# Patient Record
Sex: FEMALE | Race: BLACK OR AFRICAN AMERICAN | Employment: OTHER | URBAN - METROPOLITAN AREA
[De-identification: names, ages, dates, MRNs, and addresses within clinical notes are randomized per-mention and may not be internally consistent; named-entity substitution may affect disease eponyms.]

---

## 2017-01-04 ENCOUNTER — OFFICE VISIT (OUTPATIENT)
Dept: FAMILY MEDICINE CLINIC | Age: 82
End: 2017-01-04

## 2017-01-04 ENCOUNTER — HOSPITAL ENCOUNTER (OUTPATIENT)
Dept: LAB | Age: 82
Discharge: HOME | End: 2017-01-04

## 2017-01-04 VITALS
SYSTOLIC BLOOD PRESSURE: 121 MMHG | RESPIRATION RATE: 18 BRPM | HEART RATE: 94 BPM | HEIGHT: 69 IN | DIASTOLIC BLOOD PRESSURE: 60 MMHG | BODY MASS INDEX: 35.55 KG/M2 | TEMPERATURE: 97.3 F | OXYGEN SATURATION: 98 % | WEIGHT: 240 LBS

## 2017-01-04 DIAGNOSIS — M25.511 CHRONIC RIGHT SHOULDER PAIN: ICD-10-CM

## 2017-01-04 DIAGNOSIS — G89.29 CHRONIC RIGHT SHOULDER PAIN: ICD-10-CM

## 2017-01-04 DIAGNOSIS — N28.9 IMPAIRED RENAL FUNCTION: ICD-10-CM

## 2017-01-04 DIAGNOSIS — E78.00 HYPERCHOLESTEROLEMIA: Primary | ICD-10-CM

## 2017-01-04 PROCEDURE — 99001 SPECIMEN HANDLING PT-LAB: CPT | Performed by: FAMILY MEDICINE

## 2017-01-04 NOTE — PROGRESS NOTES
Shantell Culver 80 y.o. female   Chief Complaint   Patient presents with    Labs     12-10-16    Follow-up    Cholesterol Problem         1. Have you been to the ER, urgent care clinic since your last visit? Hospitalized since your last visit? No    2. Have you seen or consulted any other health care providers outside of the [address] [place] [address] since your last visit? Include any pap smears or colon screening.  No

## 2017-01-04 NOTE — MR AVS SNAPSHOT
Visit Information Date & Time Provider Department Dept. Phone Encounter #  
 1/4/2017 11:15 AM Scooby Shultz MD [address] [id number] Your Appointments 1/31/2017  2:20 PM  
Follow Up with Shelbie Maynard DO [facility] ([facility]) Appt Note: Follow up with Dr Tiki Atkinson in 6 months [place] [id number] [phone] [facility] [address] Upcoming Health Maintenance Date Due DTaP/Tdap/Td series (1 - Tdap) 8/30/1958 ZOSTER VACCINE AGE 60> 8/30/1997 Pneumococcal 65+ Low/Medium Risk (1 of 2 - PCV13) 8/30/2002 GLAUCOMA SCREENING Q2Y 12/31/2014 MEDICARE YEARLY EXAM 10/30/2015 INFLUENZA AGE 9 TO ADULT 8/1/2016 Allergies as of 1/4/2017  Review Complete On: 1/4/2017 By: Scooby Shultz MD  
 No Known Allergies Current Immunizations  Reviewed on 11/21/2014 No immunizations on file. Not reviewed this visit You Were Diagnosed With   
  
 Codes Comments Hypercholesterolemia    -  Primary ICD-10-CM: E78.00 ICD-9-CM: 272.0 Impaired renal function     ICD-10-CM: N28.9 ICD-9-CM: 593.9 Chronic right shoulder pain     ICD-10-CM: M25.511, G89.29 ICD-9-CM: 719.41, 338.29 Vitals BP Pulse Temp Resp Height(growth percentile) Weight(growth percentile) 121/60 (BP 1 Location: Left arm, BP Patient Position: Sitting) 94 97.3 °F (36.3 °C) (Oral) 18 5' 9\" (1.753 m) 240 lb (108.9 kg) LMP SpO2 BMI OB Status Smoking Status 12/31/1985 98% 35.44 kg/m2 Hysterectomy Former Smoker BMI and BSA Data Body Mass Index Body Surface Area  
 35.44 kg/m 2 2.3 m 2 Preferred Pharmacy Pharmacy Name Phone [facility] [place] [facility] Your Updated Medication List  
  
   
 This list is accurate as of: 1/4/17 11:50 AM.  Always use your most recent med list.  
  
  
  
  
 atorvastatin 40 mg tablet Commonly known as:  LIPITOR Take 1 Tab by mouth daily. celecoxib 200 mg capsule Commonly known as:  CeleBREX Take 1 Cap by mouth two (2) times daily as needed for Pain. furosemide 20 mg tablet Commonly known as:  LASIX Take 1 Tab by mouth daily. Indications: EDEMA  
  
 gabapentin 300 mg capsule Commonly known as:  NEURONTIN Take 300 mg by mouth three (3) times daily. * HYDROcodone-acetaminophen  mg tablet Commonly known as:  NORCO  
1 PO Q 8 HRS PRN PAIN  
  
 * HYDROcodone-acetaminophen 5-325 mg per tablet Commonly known as:  NORCO  
  
 polyethylene glycol 17 gram packet Commonly known as:  Natalia Clos Take 1 Packet by mouth daily. VITAMIN B-12 500 mcg tablet Generic drug:  cyanocobalamin Take 500 mcg by mouth daily. VITAMIN D3 1,000 unit tablet Generic drug:  cholecalciferol * Notice: This list has 2 medication(s) that are the same as other medications prescribed for you. Read the directions carefully, and ask your doctor or other care provider to review them with you. To-Do List   
 01/04/2017 Lab:  METABOLIC PANEL, BASIC   
  
 03/27/2017 Lab:  LIPID PANEL   
  
 03/27/2017 Lab:  METABOLIC PANEL, COMPREHENSIVE Patient Instructions Please contact our office if you have any questions about your visit today. Introducing [facility]! Gideon Holbrook introduces [employer] patient portal. Now you can access parts of your medical record, email your doctor's office, and request medication refills online. 1. In your internet browser, go to [email or web address] 2. Click on the First Time User? Click Here link in the Sign In box. You will see the New Member Sign Up page. 3. Enter your Pred[employer] Access Code exactly as it appears below.  You will not need to use this code after youve completed the sign-up process. If you do not sign up before the expiration date, you must request a new code. · [employer] Access Code: [email or web address] Expires: 3/2/2017 11:05 AM 
 
4. Enter the last four digits of your Social Security Number (xxxx) and Date of Birth (mm/dd/yyyy) as indicated and click Submit. You will be taken to the next sign-up page. 5. Create a [employer] ID. This will be your [employer] login ID and cannot be changed, so think of one that is secure and easy to remember. 6. Create a [employer] password. You can change your password at any time. 7. Enter your Password Reset Question and Answer. This can be used at a later time if you forget your password. 8. Enter your e-mail address. You will receive e-mail notification when new information is available in [address] 9. Click Sign Up. You can now view and download portions of your medical record. 10. Click the Download Summary menu link to download a portable copy of your medical information. If you have questions, please visit the Frequently Asked Questions section of the [employer] website. Remember, [employer] is NOT to be used for urgent needs. For medical emergencies, dial 911. Now available from your iPhone and Android! Please provide this summary of care documentation to your next provider. Your primary care clinician is listed as Yumi Wyman. If you have any questions after today's visit, please call [phone].

## 2017-01-04 NOTE — PROGRESS NOTES
HISTORY OF PRESENT ILLNESS  Josue Metcalf is a 80 y.o. female. Chief Complaint   Patient presents with    Labs     12-10-16    Follow-up    Cholesterol Problem       HPI  Pt is here for follow up of Elevated Cholesterol, and review labs. Pt had labs on 12-10-16. Labs reviewed in detail with pt. Pt notes that she has been eating more sweets through the holidays. She is taking her atorvastatin daily. Pt  She has not been taking any otc pain meds. Pt does not need medication refills today. New concerns today: pt's hip does not hurt currently. It is just numb. She does have some r shoulder pain and notices some grinding. It has been bothersome for about 2 wks now. She has been using bengay. Review of Systems   Constitutional: Negative. HENT: Negative. Respiratory: Negative. Musculoskeletal: Positive for joint pain. All other systems reviewed and are negative. Physical Exam  Physical Exam   Nursing note and vitals reviewed. Constitutional: She is oriented to person, place, and time. She appears well-developed and well-nourished. HENT:   Head: Normocephalic and atraumatic. Right Ear: External ear normal.   Left Ear: External ear normal.   Nose: Nose normal.   Eyes: Conjunctivae and EOM are normal.   Neck: Normal range of motion. Neck supple. No JVD present. Carotid bruit is not present. No thyromegaly present. Cardiovascular: Normal rate, regular rhythm, normal heart sounds and intact distal pulses. Exam reveals no gallop and no friction rub. No murmur heard. Pulmonary/Chest: Effort normal and breath sounds normal. She has no wheezes. She has no rhonchi. She has no rales. Abdominal: Soft. Bowel sounds are normal.   Musculoskeletal: Normal range of motion. Neurological: She is alert and oriented to person, place, and time. Coordination normal.   Skin: Skin is warm and dry. Psychiatric: She has a normal mood and affect.  Her behavior is normal. Judgment and thought content normal.     ASSESSMENT and Adra Frames was seen today for labs, follow-up and cholesterol problem. Diagnoses and all orders for this visit:    Hypercholesterolemia  -     METABOLIC PANEL, COMPREHENSIVE; Future  -     LIPID PANEL; Future    Impaired renal function  -     METABOLIC PANEL, BASIC; Future  -     METABOLIC PANEL, BASIC    Chronic right shoulder pain  Pt declines xray at this time. She will cont conservative management at home.       Follow-up Disposition: 3 months; sooner prn

## 2017-01-05 LAB
BUN SERPL-MCNC: 16 MG/DL (ref 8–27)
BUN/CREAT SERPL: 19 (ref 11–26)
CALCIUM SERPL-MCNC: 9.2 MG/DL (ref 8.7–10.3)
CHLORIDE SERPL-SCNC: 103 MMOL/L (ref 96–106)
CO2 SERPL-SCNC: 24 MMOL/L (ref 18–29)
CREAT SERPL-MCNC: 0.84 MG/DL (ref 0.57–1)
GLUCOSE SERPL-MCNC: 104 MG/DL (ref 65–99)
POTASSIUM SERPL-SCNC: 4.1 MMOL/L (ref 3.5–5.2)
SODIUM SERPL-SCNC: 142 MMOL/L (ref 134–144)

## 2017-03-27 DIAGNOSIS — E78.00 HYPERCHOLESTEROLEMIA: ICD-10-CM

## 2017-03-28 ENCOUNTER — HOSPITAL ENCOUNTER (OUTPATIENT)
Dept: LAB | Age: 82
Discharge: HOME | End: 2017-03-28

## 2017-03-28 PROCEDURE — 99001 SPECIMEN HANDLING PT-LAB: CPT | Performed by: FAMILY MEDICINE

## 2017-03-29 LAB
ALBUMIN SERPL-MCNC: 3.8 G/DL (ref 3.5–4.8)
ALBUMIN/GLOB SERPL: 1.4 {RATIO} (ref 1.2–2.2)
ALP SERPL-CCNC: 132 IU/L (ref 39–117)
ALT SERPL-CCNC: 10 IU/L (ref 0–32)
AST SERPL-CCNC: 15 IU/L (ref 0–40)
BILIRUB SERPL-MCNC: 0.4 MG/DL (ref 0–1.2)
BUN SERPL-MCNC: 16 MG/DL (ref 8–27)
BUN/CREAT SERPL: 18 (ref 11–26)
CALCIUM SERPL-MCNC: 9.1 MG/DL (ref 8.7–10.3)
CHLORIDE SERPL-SCNC: 107 MMOL/L (ref 96–106)
CHOLEST SERPL-MCNC: 224 MG/DL (ref 100–199)
CO2 SERPL-SCNC: 23 MMOL/L (ref 18–29)
CREAT SERPL-MCNC: 0.88 MG/DL (ref 0.57–1)
GLOBULIN SER CALC-MCNC: 2.8 G/DL (ref 1.5–4.5)
GLUCOSE SERPL-MCNC: 97 MG/DL (ref 65–99)
HDLC SERPL-MCNC: 61 MG/DL
INTERPRETATION, 910389: NORMAL
LDLC SERPL CALC-MCNC: 145 MG/DL (ref 0–99)
POTASSIUM SERPL-SCNC: 4.6 MMOL/L (ref 3.5–5.2)
PROT SERPL-MCNC: 6.6 G/DL (ref 6–8.5)
SODIUM SERPL-SCNC: 144 MMOL/L (ref 134–144)
TRIGL SERPL-MCNC: 90 MG/DL (ref 0–149)
VLDLC SERPL CALC-MCNC: 18 MG/DL (ref 5–40)

## 2017-04-04 ENCOUNTER — OFFICE VISIT (OUTPATIENT)
Dept: FAMILY MEDICINE CLINIC | Age: 82
End: 2017-04-04

## 2017-04-04 ENCOUNTER — TELEPHONE (OUTPATIENT)
Dept: FAMILY MEDICINE CLINIC | Age: 82
End: 2017-04-04

## 2017-04-04 VITALS
HEIGHT: 69 IN | BODY MASS INDEX: 35.55 KG/M2 | WEIGHT: 240 LBS | TEMPERATURE: 97.1 F | DIASTOLIC BLOOD PRESSURE: 61 MMHG | OXYGEN SATURATION: 99 % | HEART RATE: 64 BPM | RESPIRATION RATE: 16 BRPM | SYSTOLIC BLOOD PRESSURE: 127 MMHG

## 2017-04-04 DIAGNOSIS — Z13.39 SCREENING FOR ALCOHOLISM: ICD-10-CM

## 2017-04-04 DIAGNOSIS — Z00.00 ENCOUNTER FOR MEDICARE ANNUAL WELLNESS EXAM: Primary | ICD-10-CM

## 2017-04-04 DIAGNOSIS — E78.00 HYPERCHOLESTEROLEMIA: ICD-10-CM

## 2017-04-04 DIAGNOSIS — Z78.9 IMPAIRED MOBILITY AND ADLS: ICD-10-CM

## 2017-04-04 DIAGNOSIS — Z74.09 IMPAIRED MOBILITY AND ADLS: ICD-10-CM

## 2017-04-04 DIAGNOSIS — M15.9 PRIMARY OSTEOARTHRITIS INVOLVING MULTIPLE JOINTS: Chronic | ICD-10-CM

## 2017-04-04 DIAGNOSIS — Z71.89 ACP (ADVANCE CARE PLANNING): ICD-10-CM

## 2017-04-04 DIAGNOSIS — M62.81 MUSCLE WEAKNESS (GENERALIZED): ICD-10-CM

## 2017-04-04 DIAGNOSIS — Z96.641 STATUS POST RIGHT HIP REPLACEMENT: ICD-10-CM

## 2017-04-04 DIAGNOSIS — M15.9 OSTEOARTHRITIS OF MULTIPLE JOINTS, UNSPECIFIED OSTEOARTHRITIS TYPE: Primary | Chronic | ICD-10-CM

## 2017-04-04 DIAGNOSIS — R60.0 EDEMA OF BOTH LEGS: ICD-10-CM

## 2017-04-04 DIAGNOSIS — H61.21 RIGHT EAR IMPACTED CERUMEN: ICD-10-CM

## 2017-04-04 DIAGNOSIS — Z91.81 AT RISK FOR FALLS: ICD-10-CM

## 2017-04-04 RX ORDER — CELECOXIB 200 MG/1
200 CAPSULE ORAL
Qty: 60 CAP | Refills: 5 | Status: SHIPPED | OUTPATIENT
Start: 2017-04-04 | End: 2017-10-30 | Stop reason: SDUPTHER

## 2017-04-04 RX ORDER — FUROSEMIDE 20 MG/1
20 TABLET ORAL DAILY
Qty: 30 TAB | Refills: 5 | Status: SHIPPED | OUTPATIENT
Start: 2017-04-04 | End: 2018-07-18 | Stop reason: SDUPTHER

## 2017-04-04 NOTE — PROGRESS NOTES
Paige Kc 80 y.o. female   Chief Complaint   Patient presents with    Follow-up     3 month f/u    Cholesterol Problem    Osteoporosis    Labs     completed on 3-28-17         1. Have you been to the ER, urgent care clinic since your last visit? Hospitalized since your last visit? No    2. Have you seen or consulted any other health care providers outside of the [address] [place] [address] since your last visit? Include any pap smears or colon screening.  No

## 2017-04-04 NOTE — TELEPHONE ENCOUNTER
Pt tried taking her prescription for a bedside commode to [facility] but they would not accept it. She was told to call you back if it didn't work.

## 2017-04-04 NOTE — ACP (ADVANCE CARE PLANNING)
Advance Care Planning (ACP) Provider Note - Comprehensive     Date of ACP Conversation: 04/04/17  Persons included in Conversation:  patient  Length of ACP Conversation in minutes:  16 minutes    Authorized Decision Maker (if patient is incapable of making informed decisions): This person is: Other Legally Authorized Decision Maker (e.g. Next of Kin) son: Carmina Calix for ALL Patients with Decision Making Capacity:   Understanding of the healthcare agent role was assessed and information provided  Exploration of values, goals, and preferences if recovery is not expected, even with continued medical treatment in the event of: Imminent death  Severe, permanent brain injury  \"In these circumstances, what matters most to you? \"  Care focused more on comfort or quality of life. \"What, if any, treatments would you want to avoid? \" mechanical ventilation, tube feeding    Review of Existing Advance Directive:  What is your understanding of your agent's willingness to honor your wishes, even if he/she may not agree with them? her son would honor her wishes    For Serious or Chronic Illness:  Understanding of medical condition      Interventions Provided:  Recommended completion of Advance Directive form after review of ACP materials and conversation with prospective healthcare agent

## 2017-04-04 NOTE — MR AVS SNAPSHOT
Visit Information Date & Time Provider Department Dept. Phone Encounter #  
 4/4/2017 11:15 AM Agata Henry MD [facility] [id number] Upcoming Health Maintenance Date Due  
 GLAUCOMA SCREENING Q2Y 12/31/2014 MEDICARE YEARLY EXAM 10/30/2015 ZOSTER VACCINE AGE 60> 4/30/2018* DTaP/Tdap/Td series (1 - Tdap) 4/30/2018* Pneumococcal 65+ Low/Medium Risk (2 of 2 - PPSV23) 4/4/2018 *Topic was postponed. The date shown is not the original due date. Allergies as of 4/4/2017  Review Complete On: 4/4/2017 By: Agata Henry MD  
 No Known Allergies Current Immunizations  Reviewed on 11/21/2014 No immunizations on file. Not reviewed this visit You Were Diagnosed With   
  
 Codes Comments Hypercholesterolemia    -  Primary ICD-10-CM: E78.00 ICD-9-CM: 272.0 Right ear impacted cerumen     ICD-10-CM: H61.21 ICD-9-CM: 380.4 Edema of both legs     ICD-10-CM: R60.0 ICD-9-CM: 782.3 Routine general medical examination at a health care facility     ICD-10-CM: Z00.00 ICD-9-CM: V70.0 Screening for alcoholism     ICD-10-CM: Z13.89 ICD-9-CM: V79.1 Impaired mobility and ADLs     ICD-10-CM: Z74.09 
ICD-9-CM: 799.89 Vitals BP Pulse Temp Resp Height(growth percentile) Weight(growth percentile) 127/61 64 97.1 °F (36.2 °C) (Oral) 16 5' 9\" (1.753 m) 240 lb (108.9 kg) LMP SpO2 BMI OB Status Smoking Status 12/31/1985 99% 35.44 kg/m2 Hysterectomy Former Smoker BMI and BSA Data Body Mass Index Body Surface Area  
 35.44 kg/m 2 2.3 m 2 Preferred Pharmacy Pharmacy Name Phone Anny Zavala [facility] Your Updated Medication List  
  
   
This list is accurate as of: 4/4/17 12:48 PM.  Always use your most recent med list.  
  
  
  
  
 atorvastatin 40 mg tablet Commonly known as:  LIPITOR Take 1 Tab by mouth daily. celecoxib 200 mg capsule Commonly known as:  CeleBREX Take 1 Cap by mouth two (2) times daily as needed for Pain. furosemide 20 mg tablet Commonly known as:  LASIX Take 1 Tab by mouth daily. Indications: Edema  
  
 gabapentin 300 mg capsule Commonly known as:  NEURONTIN Take 300 mg by mouth three (3) times daily. HYDROcodone-acetaminophen 5-325 mg per tablet Commonly known as:  NORCO  
  
 polyethylene glycol 17 gram packet Commonly known as:  Be[place]ord Hallmark Take 1 Packet by mouth daily. VITAMIN B-12 500 mcg tablet Generic drug:  cyanocobalamin Take 500 mcg by mouth daily. VITAMIN D3 1,000 unit tablet Generic drug:  cholecalciferol Prescriptions Sent to Pharmacy Refills  
 furosemide (LASIX) 20 mg tablet 5 Sig: Take 1 Tab by mouth daily. Indications: Edema Class: Normal  
 Pharmacy: [address], [place]  #: [phone] Route: Oral  
  
We Performed the Following AMB SUPPLY ORDER [[id number] Custom] Comments:  
 Dispense (1) Bedside Commode. Patient Instructions Please contact our office if you have any questions about your visit today. Medicare Wellness Visit, Female The best way to live healthy is to have a healthy lifestyle by eating a well-balanced diet, exercising regularly, limiting alcohol and stopping smoking. Regular physical exams and screening tests are another way to keep healthy. Preventive exams provided by your health care provider can find health problems before they become diseases or illnesses. Preventive services including immunizations, screening tests, monitoring and exams can help you take care of your own health. All people over age 72 should have a pneumovax  and and a prevnar shot to prevent pneumonia. These are once in a lifetime unless you and your provider decide differently.  
 
All people over 65 should have a yearly flu shot and a tetanus vaccine every 10 years. A bone mass density to screen for osteoporosis or thinning of the bones should be done every 2 years after 65. Screening for diabetes mellitus with a blood sugar test should be done every year. Glaucoma is a disease of the eye due to increased ocular pressure that can lead to blindness and it should be done every year by an eye professional. 
 
Cardiovascular screening tests that check for elevated lipids (fatty part of blood) which can lead to heart disease and strokes should be done every 5 years. Colorectal screening that evaluates for blood or polyps in your colon should be done yearly as a stool test or every five years as a flexible sigmoidoscope or every 10 years as a colonoscopy up to age 76. Breast cancer screening with a mammogram is recommended biennially  for women age 54-69. Screening for cervical cancer with a pap smear and pelvic exam is recommended for women after age 72 years every 2 years up to age 79 or when the provider and patient decide to stop. If there is a history of cervical abnormalities or other increased risk for cancer then the test is recommended yearly. Hepatitis C screening is also recommended for anyone born between 80 through [address]. A shingles vaccine is also recommended once in a lifetime after age 61. Your Medicare Wellness Exam is recommended annually. Here is a list of your current Health Maintenance items with a due date: 
Health Maintenance Due Topic Date Due  Glaucoma Screening   12/31/2014 [address] [place] [address] Annual Well Visit  10/30/2015 Dual-Energy X-Ray Absorptiometry (DXA) Test: About This Test 
What is it? Dual-energy X-ray absorptiometry (DXA) is a test that uses two different X-ray beams to check bone thickness (density) in your spine and hip. This information is used to estimate the strength of your bones. Why is this test done?  
A DXA test is done to check for bone thinning and weakness, which can make it easier for you to break a bone. It is often done for: 
· People who are at risk for osteoporosis, including: ¨ Women who are age 72 and older. ¨ Older men. ¨ People who take some medications, such as corticosteroids. ¨ People who have certain medical conditions, such as hyperparathyroidism. · People who have osteoporosis, to see how well treatment is working. What happens before the test? 
· Women who are pregnant should not have this test. Let your doctor know if you are or might be pregnant. · You may be able to leave your clothes on for the test, but you will remove any metal buttons or pernell for the test. 
What happens during the test? 
· You will lie down on your back on a padded table. · You may need to lie with your legs straight or with your lower legs resting on a platform built into the table. · The machine will scan your bones and measure the amount of radiation they absorb. During this test you are exposed to a very low dose of radiation. How long does the test take? · The test will take about 20 minutes. What happens after the test? 
· You will probably be able to go home right away. · You can go back to your usual activities right away. Follow-up care is a key part of your treatment and safety. Be sure to make and go to all appointments, and call your doctor if you are having problems. It's also a good idea to keep a list of the medicines you take. Ask your doctor when you can expect to have your test results. Where can you learn more? Go to http://nita-kady.info/. Enter C276 in the search box to learn more about \"Dual-Energy X-Ray Absorptiometry (DXA) Test: About This Test.\" Current as of: August 4, 2016 Content Version: 11.2 © 8713-5238 CaratLane. Care instructions adapted under license by [email or web address] (which disclaims liability or warranty for this information).  If you have questions about a medical condition or this instruction, always ask your healthcare professional. [address] any warranty or liability for your use of this information. Statins: Care Instructions Your Care Instructions Statins are medicines that lower your cholesterol and your risk for a heart attack and stroke. Cholesterol is a type of fat in your blood. If you have too much cholesterol, it can build up in blood vessels. This raises your risk of heart disease, heart attack, and stroke. Statins lower cholesterol by blocking how much cholesterol your body makes. This prevents cholesterol from building up in your blood vessels. This is called hardening of the arteries. It is the starting point for some heart and blood flow problems, such as heart disease. Statins may also reduce inflammation around the buildup (called plaque). This can lower the risk that the plaque will break apart and lead to a heart attack or stroke. A heart-healthy lifestyle is important for lowering your risk whether you take statins or not. This includes eating healthy foods, being active, staying at a healthy weight, and not smoking. You must take statins regularly for them to work well. If you stop, your cholesterol and your risk will go back up. Examples of statins include: · Atorvastatin (Lipitor). · Lovastatin (Mevacor). · Pravastatin (Pravachol). · Simvastatin (Zocor). Statins interact with many medicines. So tell your doctor all of the other medicines that you take. These include prescription medicines, over-the-counter medicines, dietary supplements, and herbal products. Follow-up care is a key part of your treatment and safety. Be sure to make and go to all appointments, and call your doctor if you are having problems. It's also a good idea to know your test results and keep a list of the medicines you take. How can you care for yourself at home? · Take statins exactly as your doctor tells you.  High cholesterol has no symptoms. So it is easy to forget to take the pills. Try to make a system that reminds you to take them. · Do not take two or more medicines at the same time unless the doctor told you to. Statins can interact with other medicines. · Always tell your doctor if you think you are having a side effect. If side effects are a problem with one medicine, a different one may be used. · Keep making the lifestyle changes your doctor suggests. Eat heart-healthy foods, be active, don't smoke, and stay at a healthy weight. · Talk to your doctor about avoiding grapefruit juice if you take statins. Grapefruit juice can raise the level of this medicine in your blood. This could increase side effects. When should you call for help? Watch closely for changes in your health, and be sure to contact your doctor if: 
· You have side effects of statins. These include: ¨ Fatigue. ¨ Upset stomach. ¨ Gas. ¨ Constipation. ¨ Pain or cramps in the belly. ¨ Muscle aches. · You have any new symptoms or side effects. Where can you learn more? Go to http://nita-kady.info/. Enter R358 in the search box to learn more about \"Statins: Care Instructions. \" Current as of: June 30, 2016 Content Version: 11.2 © 5211-4288 MOAEC. Care instructions adapted under license by HealthWave (which disclaims liability or warranty for this information). If you have questions about a medical condition or this instruction, always ask your healthcare professional. [place] [address] any warranty or liability for your use of this information. Heart-Healthy Diet: Care Instructions Your Care Instructions A heart-healthy diet has lots of vegetables, fruits, nuts, beans, and whole grains, and is low in salt. It limits foods that are high in saturated fat, such as meats, cheeses, and fried foods.  It may be hard to change your diet, but even small changes can lower your risk of heart attack and heart disease. Follow-up care is a key part of your treatment and safety. Be sure to make and go to all appointments, and call your doctor if you are having problems. It's also a good idea to know your test results and keep a list of the medicines you take. How can you care for yourself at home? Watch your portions · Learn what a serving is. A \"serving\" and a \"portion\" are not always the same thing. Make sure that you are not eating larger portions than are recommended. For example, a serving of pasta is ½ cup. A serving size of meat is 2 to 3 ounces. A 3-ounce serving is about the size of a deck of cards. Measure serving sizes until you are good at Coolin" them. Keep in mind that restaurants often serve portions that are 2 or 3 times the size of one serving. · To keep your energy level up and keep you from feeling hungry, eat often but in smaller portions. · Eat only the number of calories you need to stay at a healthy weight. If you need to lose weight, eat fewer calories than your body burns (through exercise and other physical activity). Eat more fruits and vegetables · Eat a variety of fruit and vegetables every day. Dark green, deep orange, red, or yellow fruits and vegetables are especially good for you. Examples include spinach, carrots, peaches, and berries. · Keep carrots, celery, and other veggies handy for snacks. Buy fruit that is in season and store it where you can see it so that you will be tempted to eat it. · Cook dishes that have a lot of veggies in them, such as stir-fries and soups. Limit saturated and trans fat · Read food labels, and try to avoid saturated and trans fats. They increase your risk of heart disease. Trans fat is found in many processed foods such as cookies and crackers. · Use olive or canola oil when you cook. Try cholesterol-lowering spreads, such as Benecol or Take Control. · Bake, broil, grill, or steam foods instead of frying them. · Choose lean meats instead of high-fat meats such as hot dogs and sausages. Cut off all visible fat when you prepare meat. · Eat fish, skinless poultry, and meat alternatives such as soy products instead of high-fat meats. Soy products, such as tofu, may be especially good for your heart. · Choose low-fat or fat-free milk and dairy products. Eat fish · Eat at least two servings of fish a week. Certain fish, such as salmon and tuna, contain omega-3 fatty acids, which may help reduce your risk of heart attack. Eat foods high in fiber · Eat a variety of grain products every day. Include whole-grain foods that have lots of fiber and nutrients. Examples of whole-grain foods include oats, whole wheat bread, and brown rice. · Buy whole-grain breads and cereals, instead of white bread or pastries. Limit salt and sodium · Limit how much salt and sodium you eat to help lower your blood pressure. · Taste food before you salt it. Add only a little salt when you think you need it. With time, your taste buds will adjust to less salt. · Eat fewer snack items, fast foods, and other high-salt, processed foods. Check food labels for the amount of sodium in packaged foods. · Choose low-sodium versions of canned goods (such as soups, vegetables, and beans). Limit sugar · Limit drinks and foods with added sugar. These include candy, desserts, and soda pop. Limit alcohol · Limit alcohol to no more than 2 drinks a day for men and 1 drink a day for women. Too much alcohol can cause health problems. When should you call for help? Watch closely for changes in your health, and be sure to contact your doctor if: 
· You would like help planning heart-healthy meals. Where can you learn more? Go to http://nita-kady.info/. Enter V137 in the search box to learn more about \"Heart-Healthy Diet: Care Instructions. \" 
 Current as of: January 27, 2016 Content Version: 11.2 © 8198-1423 TraceLink. Care instructions adapted under license by Tagstr (which disclaims liability or warranty for this information). If you have questions about a medical condition or this instruction, always ask your healthcare professional. [facility] [address] any warranty or liability for your use of this information. Advance Directives: Care Instructions Your Care Instructions An advance directive is a legal way to state your wishes at the end of your life. It tells your family and your doctor what to do if you can no longer say what you want. There are two main types of advance directives. You can change them any time that your wishes change. · A living will tells your family and your doctor your wishes about life support and other treatment. · A durable power of  for health care lets you name a person to make treatment decisions for you when you can't speak for yourself. This person is called a health care agent. If you do not have an advance directive, decisions about your medical care may be made by a doctor or a  who doesn't know you. It may help to think of an advance directive as a gift to the people who care for you. If you have one, they won't have to make tough decisions by themselves. Follow-up care is a key part of your treatment and safety. Be sure to make and go to all appointments, and call your doctor if you are having problems. It's also a good idea to know your test results and keep a list of the medicines you take. How can you care for yourself at home? · Discuss your wishes with your loved ones and your doctor. This way, there are no surprises. · Many states have a unique form. Or you might use a universal form that has been approved by many states. This kind of form can sometimes be completed and stored online.  Your electronic copy will then be available wherever you have a connection to the Internet. In most cases, doctors will respect your wishes even if you have a form from a different state. · You don't need a  to do an advance directive. But you may want to get legal advice. · Think about these questions when you prepare an advance directive: ¨ Who do you want to make decisions about your medical care if you are not able to? Many people choose a family member or close friend. ¨ Do you know enough about life support methods that might be used? If not, talk to your doctor so you understand. ¨ What are you most afraid of that might happen? You might be afraid of having pain, losing your independence, or being kept alive by machines. ¨ Where would you prefer to die? Choices include your home, a hospital, or a nursing home. ¨ Would you like to have information about hospice care to support you and your family? ¨ Do you want to donate organs when you die? ¨ Do you want certain Islam practices performed before you die? If so, put your wishes in the advance directive. · Read your advance directive every year, and make changes as needed. When should you call for help? Be sure to contact your doctor if you have any questions. Where can you learn more? Go to http://nita-kady.info/. Enter R264 in the search box to learn more about \"Advance Directives: Care Instructions. \" Current as of: November 17, 2016 Content Version: 11.2 © 8210-3550 Healthwise, Incorporated. Care instructions adapted under license by Scards (which disclaims liability or warranty for this information). If you have questions about a medical condition or this instruction, always ask your healthcare professional. [address] any warranty or liability for your use of this information. Introducing [facility]!    
 [employer] introduces [employer] patient portal. Now you can access parts of your medical record, email your doctor's office, and request medication refills online. 1. In your internet browser, go to [email or web address] 2. Click on the First Time User? Click Here link in the Sign In box. You will see the New Member Sign Up page. 3. Enter your [employer] Access Code exactly as it appears below. You will not need to use this code after youve completed the sign-up process. If you do not sign up before the expiration date, you must request a new code. · [employer] Access Code: [email or web address] Expires: 6/26/2017  7:55 AM 
 
4. Enter the last four digits of your Social Security Number (xxxx) and Date of Birth (mm/dd/yyyy) as indicated and click Submit. You will be taken to the next sign-up page. 5. Create a [employer] ID. This will be your [employer] login ID and cannot be changed, so think of one that is secure and easy to remember. 6. Create a [employer] password. You can change your password at any time. 7. Enter your Password Reset Question and Answer. This can be used at a later time if you forget your password. 8. Enter your e-mail address. You will receive e-mail notification when new information is available in [address] 9. Click Sign Up. You can now view and download portions of your medical record. 10. Click the Download Summary menu link to download a portable copy of your medical information. If you have questions, please visit the Frequently Asked Questions section of the One [employer] website. Remember, [employer] is NOT to be used for urgent needs. For medical emergencies, dial 911. Now available from your iPhone and Android! Please provide this summary of care documentation to your next provider. Your primary care clinician is listed as [facility]. If you have any questions after today's visit, please call [phone].

## 2017-04-04 NOTE — PATIENT INSTRUCTIONS
Please contact our office if you have any questions about your visit today. Medicare Wellness Visit, Female    The best way to live healthy is to have a healthy lifestyle by eating a well-balanced diet, exercising regularly, limiting alcohol and stopping smoking. Regular physical exams and screening tests are another way to keep healthy. Preventive exams provided by your health care provider can find health problems before they become diseases or illnesses. Preventive services including immunizations, screening tests, monitoring and exams can help you take care of your own health. All people over age 72 should have a pneumovax  and and a prevnar shot to prevent pneumonia. These are once in a lifetime unless you and your provider decide differently. All people over 65 should have a yearly flu shot and a tetanus vaccine every 10 years. A bone mass density to screen for osteoporosis or thinning of the bones should be done every 2 years after 65. Screening for diabetes mellitus with a blood sugar test should be done every year. Glaucoma is a disease of the eye due to increased ocular pressure that can lead to blindness and it should be done every year by an eye professional.    Cardiovascular screening tests that check for elevated lipids (fatty part of blood) which can lead to heart disease and strokes should be done every 5 years. Colorectal screening that evaluates for blood or polyps in your colon should be done yearly as a stool test or every five years as a flexible sigmoidoscope or every 10 years as a colonoscopy up to age 76. Breast cancer screening with a mammogram is recommended biennially  for women age 54-69. Screening for cervical cancer with a pap smear and pelvic exam is recommended for women after age 72 years every 2 years up to age 79 or when the provider and patient decide to stop. If there is a history of cervical abnormalities or other increased risk for cancer then the test is recommended yearly. Hepatitis C screening is also recommended for anyone born between [birth year] through [address]. A shingles vaccine is also recommended once in a lifetime after age 61. Your Medicare Wellness Exam is recommended annually. Here is a list of your current Health Maintenance items with a due date:  Health Maintenance Due   Topic Date Due    Glaucoma Screening   12/31/2014    Annual Well Visit  10/30/2015            Dual-Energy X-Ray Absorptiometry (DXA) Test: About This Test  What is it? Dual-energy X-ray absorptiometry (DXA) is a test that uses two different X-ray beams to check bone thickness (density) in your spine and hip. This information is used to estimate the strength of your bones. Why is this test done? A DXA test is done to check for bone thinning and weakness, which can make it easier for you to break a bone. It is often done for:  · People who are at risk for osteoporosis, including:  ¨ Women who are age 72 and older. ¨ Older men. ¨ People who take some medications, such as corticosteroids. ¨ People who have certain medical conditions, such as hyperparathyroidism. · People who have osteoporosis, to see how well treatment is working. What happens before the test?  · Women who are pregnant should not have this test. Let your doctor know if you are or might be pregnant. · You may be able to leave your clothes on for the test, but you will remove any metal buttons or pernell for the test.  What happens during the test?  · You will lie down on your back on a padded table. · You may need to lie with your legs straight or with your lower legs resting on a platform built into the table. · The machine will scan your bones and measure the amount of radiation they absorb. During this test you are exposed to a very low dose of radiation. How long does the test take? · The test will take about 20 minutes.   What happens after the test?  · You will probably be able to go home right away. · You can go back to your usual activities right away. Follow-up care is a key part of your treatment and safety. Be sure to make and go to all appointments, and call your doctor if you are having problems. It's also a good idea to keep a list of the medicines you take. Ask your doctor when you can expect to have your test results. Where can you learn more? Go to http://nita-kady.info/. Enter J244 in the search box to learn more about \"Dual-Energy X-Ray Absorptiometry (DXA) Test: About This Test.\"  Current as of: August 4, 2016  Content Version: 11.2  © 8857-6745 [employer]. Care instructions adapted under license by [employer] (which disclaims liability or warranty for this information). If you have questions about a medical condition or this instruction, always ask your healthcare professional. [place] [address] any warranty or liability for your use of this information. Statins: Care Instructions  Your Care Instructions  Statins are medicines that lower your cholesterol and your risk for a heart attack and stroke. Cholesterol is a type of fat in your blood. If you have too much cholesterol, it can build up in blood vessels. This raises your risk of heart disease, heart attack, and stroke. Statins lower cholesterol by blocking how much cholesterol your body makes. This prevents cholesterol from building up in your blood vessels. This is called hardening of the arteries. It is the starting point for some heart and blood flow problems, such as heart disease. Statins may also reduce inflammation around the buildup (called plaque). This can lower the risk that the plaque will break apart and lead to a heart attack or stroke. A heart-healthy lifestyle is important for lowering your risk whether you take statins or not. This includes eating healthy foods, being active, staying at a healthy weight, and not smoking.   You must take statins regularly for them to work well. If you stop, your cholesterol and your risk will go back up. Examples of statins include:  · Atorvastatin (Lipitor). · Lovastatin (Mevacor). · Pravastatin (Pravachol). · Simvastatin (Zocor). Statins interact with many medicines. So tell your doctor all of the other medicines that you take. These include prescription medicines, over-the-counter medicines, dietary supplements, and herbal products. Follow-up care is a key part of your treatment and safety. Be sure to make and go to all appointments, and call your doctor if you are having problems. It's also a good idea to know your test results and keep a list of the medicines you take. How can you care for yourself at home? · Take statins exactly as your doctor tells you. High cholesterol has no symptoms. So it is easy to forget to take the pills. Try to make a system that reminds you to take them. · Do not take two or more medicines at the same time unless the doctor told you to. Statins can interact with other medicines. · Always tell your doctor if you think you are having a side effect. If side effects are a problem with one medicine, a different one may be used. · Keep making the lifestyle changes your doctor suggests. Eat heart-healthy foods, be active, don't smoke, and stay at a healthy weight. · Talk to your doctor about avoiding grapefruit juice if you take statins. Grapefruit juice can raise the level of this medicine in your blood. This could increase side effects. When should you call for help? Watch closely for changes in your health, and be sure to contact your doctor if:  · You have side effects of statins. These include:  ¨ Fatigue. ¨ Upset stomach. ¨ Gas. ¨ Constipation. ¨ Pain or cramps in the belly. ¨ Muscle aches. · You have any new symptoms or side effects. Where can you learn more? Go to http://nita-kady.info/.   Enter R358 in the search box to learn more about \"Statins: Care Instructions. \"  Current as of: June 30, 2016  Content Version: 11.2  © 7185-9204 [employer]. Care instructions adapted under license by [employer] (which disclaims liability or warranty for this information). If you have questions about a medical condition or this instruction, always ask your healthcare professional. [address] any warranty or liability for your use of this information. Heart-Healthy Diet: Care Instructions  Your Care Instructions    A heart-healthy diet has lots of vegetables, fruits, nuts, beans, and whole grains, and is low in salt. It limits foods that are high in saturated fat, such as meats, cheeses, and fried foods. It may be hard to change your diet, but even small changes can lower your risk of heart attack and heart disease. Follow-up care is a key part of your treatment and safety. Be sure to make and go to all appointments, and call your doctor if you are having problems. It's also a good idea to know your test results and keep a list of the medicines you take. How can you care for yourself at home? Watch your portions  · Learn what a serving is. A \"serving\" and a \"portion\" are not always the same thing. Make sure that you are not eating larger portions than are recommended. For example, a serving of pasta is ½ cup. A serving size of meat is 2 to 3 ounces. A 3-ounce serving is about the size of a deck of cards. Measure serving sizes until you are good at [place]" them. Keep in mind that restaurants often serve portions that are 2 or 3 times the size of one serving. · To keep your energy level up and keep you from feeling hungry, eat often but in smaller portions. · Eat only the number of calories you need to stay at a healthy weight. If you need to lose weight, eat fewer calories than your body burns (through exercise and other physical activity).   Eat more fruits and vegetables  · Eat a variety of fruit and vegetables every day. Dark green, deep orange, red, or yellow fruits and vegetables are especially good for you. Examples include spinach, carrots, peaches, and berries. · Keep carrots, celery, and other veggies handy for snacks. Buy fruit that is in season and store it where you can see it so that you will be tempted to eat it. · Cook dishes that have a lot of veggies in them, such as stir-fries and soups. Limit saturated and trans fat  · Read food labels, and try to avoid saturated and trans fats. They increase your risk of heart disease. Trans fat is found in many processed foods such as cookies and crackers. · Use olive or canola oil when you cook. Try cholesterol-lowering spreads, such as Benecol or Take Control. · Bake, broil, grill, or steam foods instead of frying them. · Choose lean meats instead of high-fat meats such as hot dogs and sausages. Cut off all visible fat when you prepare meat. · Eat fish, skinless poultry, and meat alternatives such as soy products instead of high-fat meats. Soy products, such as tofu, may be especially good for your heart. · Choose low-fat or fat-free milk and dairy products. Eat fish  · Eat at least two servings of fish a week. Certain fish, such as salmon and tuna, contain omega-3 fatty acids, which may help reduce your risk of heart attack. Eat foods high in fiber  · Eat a variety of grain products every day. Include whole-grain foods that have lots of fiber and nutrients. Examples of whole-grain foods include oats, whole wheat bread, and brown rice. · Buy whole-grain breads and cereals, instead of white bread or pastries. Limit salt and sodium  · Limit how much salt and sodium you eat to help lower your blood pressure. · Taste food before you salt it. Add only a little salt when you think you need it. With time, your taste buds will adjust to less salt. · Eat fewer snack items, fast foods, and other high-salt, processed foods.  Check food labels for the amount of sodium in packaged foods. · Choose low-sodium versions of canned goods (such as soups, vegetables, and beans). Limit sugar  · Limit drinks and foods with added sugar. These include candy, desserts, and soda pop. Limit alcohol  · Limit alcohol to no more than 2 drinks a day for men and 1 drink a day for women. Too much alcohol can cause health problems. When should you call for help? Watch closely for changes in your health, and be sure to contact your doctor if:  · You would like help planning heart-healthy meals. Where can you learn more? Go to http://nitaCitrus Lanekady.info/. Enter V137 in the search box to learn more about \"Heart-Healthy Diet: Care Instructions. \"  Current as of: January 27, 2016  Content Version: 11.2  © 2401-5656 [employer]. Care instructions adapted under license by [employer] (which disclaims liability or warranty for this information). If you have questions about a medical condition or this instruction, always ask your healthcare professional. [place] [address] any warranty or liability for your use of this information. Advance Directives: Care Instructions  Your Care Instructions  An advance directive is a legal way to state your wishes at the end of your life. It tells your family and your doctor what to do if you can no longer say what you want. There are two main types of advance directives. You can change them any time that your wishes change. · A living will tells your family and your doctor your wishes about life support and other treatment. · A durable power of  for health care lets you name a person to make treatment decisions for you when you can't speak for yourself. This person is called a health care agent. If you do not have an advance directive, decisions about your medical care may be made by a doctor or a  who doesn't know you.   It may help to think of an advance directive as a gift to the people who care for you. If you have one, they won't have to make tough decisions by themselves. Follow-up care is a key part of your treatment and safety. Be sure to make and go to all appointments, and call your doctor if you are having problems. It's also a good idea to know your test results and keep a list of the medicines you take. How can you care for yourself at home? · Discuss your wishes with your loved ones and your doctor. This way, there are no surprises. · Many states have a unique form. Or you might use a universal form that has been approved by many states. This kind of form can sometimes be completed and stored online. Your electronic copy will then be available wherever you have a connection to the Internet. In most cases, doctors will respect your wishes even if you have a form from a different state. · You don't need a  to do an advance directive. But you may want to get legal advice. · Think about these questions when you prepare an advance directive:  ¨ Who do you want to make decisions about your medical care if you are not able to? Many people choose a family member or close friend. ¨ Do you know enough about life support methods that might be used? If not, talk to your doctor so you understand. ¨ What are you most afraid of that might happen? You might be afraid of having pain, losing your independence, or being kept alive by machines. ¨ Where would you prefer to die? Choices include your home, a hospital, or a nursing home. ¨ Would you like to have information about hospice care to support you and your family? ¨ Do you want to donate organs when you die? ¨ Do you want certain Voodoo practices performed before you die? If so, put your wishes in the advance directive. · Read your advance directive every year, and make changes as needed. When should you call for help? Be sure to contact your doctor if you have any questions. Where can you learn more?   Go to http://nita-kady.info/. Enter R264 in the search box to learn more about \"Advance Directives: Care Instructions. \"  Current as of: November 17, 2016  Content Version: 11.2  © 1808-8899 [employer], [facility]. Care instructions adapted under license by [employer] (which disclaims liability or warranty for this information). If you have questions about a medical condition or this instruction, always ask your healthcare professional. [place] [address] any warranty or liability for your use of this information.

## 2017-04-04 NOTE — PROGRESS NOTES
HISTORY OF PRESENT ILLNESS  Margoth Bardales is a 80 y.o. female. Chief Complaint   Patient presents with    Follow-up     3 month f/u    Cholesterol Problem    Osteoporosis    Labs     completed on 3-28-17    Wax in Ear     right ear x 1 month       HPI  Pt is here for follow up of Cholesterol, and Osteoporosis. Pt had labs on 3-28-17. Labs reviewed in detail with pt. Pt reports that she has been eating a lot of sweets lately. Pt does need medication refills today. Pt requests electronic refills. New concerns today: Pt reports having increased wax in the right ear x 1 month, causing minor hearing difficulty. Health Maintenance reviewed - pt declines pneumonia vaccination; pt will sched eye appt. ROS  Review of Systems   Constitutional: Negative. HENT: Negative. Respiratory: Negative. Cardiovascular: Negative. All other systems reviewed and are negative. Physical Exam  Physical Exam   Nursing note and vitals reviewed. Constitutional: She is oriented to person, place, and time. She appears well-developed and well-nourished. HENT:   Head: Normocephalic and atraumatic. Right Ear: External ear normal.  EAC: cerumen impaction noted  Left Ear: External ear normal.   EAC: small amount of cerumen; TM wnl. Nose: Nose normal.   Eyes: Conjunctivae and EOM are normal.   Neck: Normal range of motion. Neck supple. No JVD present. Carotid bruit is not present. No thyromegaly present. Cardiovascular: Normal rate, regular rhythm, normal heart sounds and intact distal pulses. Exam reveals no gallop and no friction rub. No murmur heard. Pulmonary/Chest: Effort normal and breath sounds normal. She has no wheezes. She has no rhonchi. She has no rales. Abdominal: Soft. Bowel sounds are normal.   Musculoskeletal: Normal range of motion. Neurological: She is alert and oriented to person, place, and time. Coordination normal.   Skin: Skin is warm and dry.    Psychiatric: She has a normal mood and affect. Her behavior is normal. Judgment and thought content normal.     ASSESSMENT and Thayer Duane was seen today for follow-up, cholesterol problem, osteoporosis, labs and wax in ear. Diagnoses and all orders for this visit:    Hypercholesterolemia  Stable, cont pres tx plan. Right ear impacted cerumen  Pt referred to ENT. Contact info given. Edema of both legs  -     furosemide (LASIX) 20 mg tablet; Take 1 Tab by mouth daily. Indications: Edema  Stable, cont pres tx plan. Primary osteoarthritis involving multiple joints  -     celecoxib (CELEBREX) 200 mg capsule; Take 1 Cap by mouth two (2) times daily as needed for Pain. Follow-up Disposition: 3 months; sooner prn                              This is a Subsequent Medicare Annual Wellness Visit providing Personalized Prevention Plan Services (PPPS) (Performed 12 months after initial AWV and PPPS )    I have reviewed the patient's medical history in detail and updated the computerized patient record. History     Past Medical History:   Diagnosis Date    Decreased calculated GFR 12/5/2014    Dyslipidemia     High vitamin D level 12/6/2014    Vitamin D 25-Hydroxy (12/06/2014) = 138.9     History of echocardiogram 08/29/2014    EF 60%. No WMA. Mild conc LVH. Gr 1 DDfx. Mild RVE. Mild CATRACHO. Sm right & sm left pleural effusion.  History of kidney stones     Hypercholesterolemia     Lower extremity venous duplex 10/02/2014    No DVT bilaterally.     Numbness in both hands     Osteoarthritis     Osteoarthritis of right hip     Osteoporosis     Peripheral neuropathy (HCC)     Peripheral vascular disease (HCC)     Postoperative anemia due to acute blood loss     Vaginal fibroids     resolved s/p hyst    Varicose veins       Past Surgical History:   Procedure Laterality Date    HX CHOLECYSTECTOMY      HX HERNIA REPAIR  2010    abdomina,had 2nd surgery for infection    HX HIP REPLACEMENT Right 12/02/2014    S/P Right total hip replacement (12/02/2014 - Dr. Trevor Bedoya)   [address] HX KNEE REPLACEMENT      TOTAL KNEE ARTHROPLASTY  2006    left    TOTAL KNEE ARTHROPLASTY  2001    right    VASCULAR SURGERY PROCEDURE UNLIST      Bilateral leg vein stripping     Current Outpatient Prescriptions   Medication Sig Dispense Refill    furosemide (LASIX) 20 mg tablet Take 1 Tab by mouth daily. Indications: Edema 30 Tab 5    celecoxib (CELEBREX) 200 mg capsule Take 1 Cap by mouth two (2) times daily as needed for Pain. 60 Cap 5    atorvastatin (LIPITOR) 40 mg tablet Take 1 Tab by mouth daily. 30 Tab 5    HYDROcodone-acetaminophen (NORCO) 5-325 mg per tablet       VITAMIN D3 1,000 unit tablet       polyethylene glycol (MIRALAX) 17 gram packet Take 1 Packet by mouth daily. 30 Packet 0    gabapentin (NEURONTIN) 300 mg capsule Take 300 mg by mouth three (3) times daily.  cyanocobalamin (VITAMIN B-12) 500 mcg tablet Take 500 mcg by mouth daily.          No Known Allergies  Family History   Problem Relation Age of Onset   [facility] Arthritis-rheumatoid Mother     Heart Attack Mother    [facility] Arthritis-rheumatoid Father     Other Father      gangrene   [facility] Arthritis-osteo Sister     Other Brother      pna    Arthritis-osteo Brother     Cancer Daughter      in remission    Arthritis-osteo Brother     Diabetes Brother     Arthritis-osteo Sister      Social History   Substance Use Topics    Smoking status: Former Smoker     Years: 1.00     Types: Cigarettes     Quit date: 1/1/1982    Smokeless tobacco: Never Used    Alcohol use No      Comment: Quit alcohol in 1982     Patient Active Problem List   Diagnosis Code    High vitamin D level E67.3    Dyslipidemia E78.5    Varicose veins I86.8    Osteoarthritis M19.90    Osteoporosis M81.0    Status post right hip replacement Z96.641    Impaired mobility and ADLs Z74.09    Peripheral neuropathy (HCC) G62.9    Peripheral vascular disease ([facility] [address] I73.9    Postoperative anemia due to acute blood loss D62    Osteoarthritis of right hip M16.11    Decreased calculated GFR R94.4    History of kidney stones Z87.442    Numbness in both hands R20.0    Small bowel obstruction (HCC) K56.69    Hypercholesterolemia E78.00    Fx intertrochanteric hip (HCC) S72.143A    Muscle weakness (generalized) M62.81    Difficulty in walking, not elsewhere classified R26.2    ACP (advance care planning) Z71.89       Depression Risk Factor Screening:     PHQ 2 / 9, over the last two weeks 6/10/2016   Little interest or pleasure in doing things Not at all   Feeling down, depressed or hopeless Not at all   Total Score PHQ 2 0     Alcohol Risk Factor Screening: On any occasion during the past 3 months, have you had more than 3 drinks containing alcohol? No    Do you average more than 7 drinks per week? No      Functional Ability and Level of Safety:     Hearing Loss   mild    Activities of Daily Living   Partial assistance. Requires assistance with: bathing and hygiene and feeding    Fall Risk     Fall Risk Assessment, last 12 mths 6/10/2016   Able to walk? Yes   Fall in past 12 months?  No   Fall with injury? -   Number of falls in past 12 months -   Fall Risk Score -     Abuse Screen   Patient is not abused    Review of Systems   A comprehensive review of systems was negative except for: Ears, nose, mouth, throat, and face: positive for hearing loss    Physical Examination     Evaluation of Cognitive Function:  Mood/affect:  happy  Appearance: age appropriate  Family member/caregiver input: none    Visit Vitals    /61    Pulse 64    Temp 97.1 °F (36.2 °C) (Oral)    Resp 16    Ht 5' 9\" (1.753 m)    Wt 240 lb (108.9 kg)    LMP 12/31/1985    SpO2 99%    BMI 35.44 kg/m2     General appearance: alert, cooperative, no distress, appears stated age  Head: Normocephalic, without obvious abnormality, atraumatic  Eyes: negative findings: lids and lashes normal and conjunctivae and sclerae normal  Ears: abnormal external canal AD - not visualized secondary to cerumen, normal external canal AS, normal TM AS  Nose: no discharge, nl external nose  Neck: supple, symmetrical, trachea midline, no adenopathy, thyroid: not enlarged, symmetric, no tenderness/mass/nodules, no carotid bruit and no JVD  Lungs: clear to auscultation bilaterally  Heart: regular rate and rhythm, S1, S2 normal, no murmur, click, rub or gallop  Extremities: extremities normal, atraumatic, no cyanosis or edema  Neurologic: Mental status: Alert, oriented, thought content appropriate  Gait: Antalgic; ambulates with rollator walker    Patient Care Team:  Yung Jerome MD as PCP - General (Family Practice)  Donneta Frankel, MD (Neurology)  Mohan Dodson DO (Cardiology)  Quinton Xavier MD (Orthopedic Surgery)  Brayan Soto MD (General Surgery)  Geri Acosta MD (Cardiology)    Advice/Referrals/Counseling   Education and counseling provided:  End-of-Life planning (with patient's consent)  Pneumococcal Vaccine      Assessment/Plan   Hanny Tracy was seen today for follow-up, cholesterol problem, osteoporosis, labs and wax in ear. Diagnoses and all orders for this visit:    Encounter for Medicare annual wellness exam    Screening for alcoholism    Impaired mobility and ADLs  -     AMB SUPPLY ORDER    ACP (advance care planning)    .

## 2017-04-05 NOTE — TELEPHONE ENCOUNTER
PVO order sent to [facility]. Per [facility] a commode was approved on 9-2016 but patient refused to  commode due to location.

## 2017-04-06 NOTE — TELEPHONE ENCOUNTER
Pt notified that she can either buy the 3-in-1 commode at [facility] for $41 or from [facility] for no cost, but she will have to  in person.    Order also faxed to [facility].

## 2017-07-22 ENCOUNTER — HOSPITAL ENCOUNTER (OUTPATIENT)
Dept: LAB | Age: 82
Discharge: HOME | End: 2017-07-22

## 2017-07-22 PROCEDURE — 99001 SPECIMEN HANDLING PT-LAB: CPT | Performed by: FAMILY MEDICINE

## 2017-07-23 LAB
ALBUMIN SERPL-MCNC: 3.9 G/DL (ref 3.5–4.8)
ALBUMIN/GLOB SERPL: 1.5 {RATIO} (ref 1.2–2.2)
ALP SERPL-CCNC: 132 IU/L (ref 39–117)
ALT SERPL-CCNC: 11 IU/L (ref 0–32)
AST SERPL-CCNC: 14 IU/L (ref 0–40)
BILIRUB SERPL-MCNC: 0.4 MG/DL (ref 0–1.2)
BUN SERPL-MCNC: 16 MG/DL (ref 8–27)
BUN/CREAT SERPL: 17 (ref 12–28)
CALCIUM SERPL-MCNC: 9.3 MG/DL (ref 8.7–10.3)
CHLORIDE SERPL-SCNC: 104 MMOL/L (ref 96–106)
CHOLEST SERPL-MCNC: 195 MG/DL (ref 100–199)
CO2 SERPL-SCNC: 25 MMOL/L (ref 18–29)
CREAT SERPL-MCNC: 0.96 MG/DL (ref 0.57–1)
GLOBULIN SER CALC-MCNC: 2.6 G/DL (ref 1.5–4.5)
GLUCOSE SERPL-MCNC: 93 MG/DL (ref 65–99)
HDLC SERPL-MCNC: 55 MG/DL
INTERPRETATION, 910389: NORMAL
INTERPRETATION: NORMAL
LDLC SERPL CALC-MCNC: 124 MG/DL (ref 0–99)
PDF IMAGE, 910387: NORMAL
POTASSIUM SERPL-SCNC: 4.7 MMOL/L (ref 3.5–5.2)
PROT SERPL-MCNC: 6.5 G/DL (ref 6–8.5)
SODIUM SERPL-SCNC: 142 MMOL/L (ref 134–144)
TRIGL SERPL-MCNC: 80 MG/DL (ref 0–149)
VLDLC SERPL CALC-MCNC: 16 MG/DL (ref 5–40)

## 2017-07-24 NOTE — PROGRESS NOTES
This lady is reportedly on Lipitor 40 mg daily. Her CMP looks okay except for very slightly elevated alk phos which is not concerning. Her total cholesterol is 195 and her  somewhat better than 3 months ago but still not optimal.  I would bump her Lipitor to 80 mg daily if she is willing to do that and repeated again in a couple of months.  ES

## 2017-07-26 ENCOUNTER — OFFICE VISIT (OUTPATIENT)
Dept: FAMILY MEDICINE CLINIC | Age: 82
End: 2017-07-26

## 2017-07-26 VITALS
HEART RATE: 70 BPM | RESPIRATION RATE: 18 BRPM | BODY MASS INDEX: 35.25 KG/M2 | HEIGHT: 69 IN | TEMPERATURE: 97.9 F | DIASTOLIC BLOOD PRESSURE: 72 MMHG | WEIGHT: 238 LBS | OXYGEN SATURATION: 97 % | SYSTOLIC BLOOD PRESSURE: 135 MMHG

## 2017-07-26 DIAGNOSIS — E78.00 HYPERCHOLESTEROLEMIA: ICD-10-CM

## 2017-07-26 DIAGNOSIS — G89.29 CHRONIC PAIN OF BOTH SHOULDERS: Primary | ICD-10-CM

## 2017-07-26 DIAGNOSIS — M25.512 CHRONIC PAIN OF BOTH SHOULDERS: Primary | ICD-10-CM

## 2017-07-26 DIAGNOSIS — M25.511 CHRONIC PAIN OF BOTH SHOULDERS: Primary | ICD-10-CM

## 2017-07-26 RX ORDER — ATORVASTATIN CALCIUM 40 MG/1
40 TABLET, FILM COATED ORAL DAILY
Qty: 30 TAB | Refills: 5 | Status: SHIPPED | OUTPATIENT
Start: 2017-07-26 | End: 2017-10-30 | Stop reason: SDUPTHER

## 2017-07-26 NOTE — PROGRESS NOTES
Kelly Shirley 80 y.o. female   Chief Complaint   Patient presents with    Follow-up     3 month f/u    Cholesterol Problem    Osteoarthritis     Multipile joints    Medication Refill         1. Have you been to the ER, urgent care clinic since your last visit? Hospitalized since your last visit? No    2. Have you seen or consulted any other health care providers outside of the [address] [place] [address] since your last visit? Include any pap smears or colon screening.  No

## 2017-07-26 NOTE — PROGRESS NOTES
HISTORY OF PRESENT ILLNESS  Sherri Olivarez is a 80 y.o. female. Chief Complaint   Patient presents with    Follow-up     3 month f/u    Cholesterol Problem    Osteoarthritis     Multipile joints    Medication Refill       HPI  Pt is here for follow up of Cholesterol, and Osteoarthritis. Pt is doing well overall    Pt had labs on 7-22-17. Labs reviewed in detail with pt. Pt does need medication refills today. Pt requests electronic refills. New concerns today: Pt reports increased pain with bilateral shoulders. Pain scale today is a 8/10. Pt reports limited ROM. She has been taking celebrex  And using bengay with some improvement. Review of Systems   Constitutional: Negative. HENT: Negative. Respiratory: Negative. Cardiovascular: Negative. Musculoskeletal: Positive for joint pain. All other systems reviewed and are negative. Physical Exam  Physical Exam   Nursing note and vitals reviewed. Constitutional: She is oriented to person, place, and time. She appears well-developed and well-nourished. HENT:   Head: Normocephalic and atraumatic. Right Ear: External ear normal.   Left Ear: External ear normal.   Nose: Nose normal.   Eyes: Conjunctivae and EOM are normal.   Neck: Normal range of motion. Neck supple. No JVD present. Carotid bruit is not present. No thyromegaly present. Cardiovascular: Normal rate, regular rhythm, normal heart sounds and intact distal pulses. Exam reveals no gallop and no friction rub. No murmur heard. Pulmonary/Chest: Effort normal and breath sounds normal. She has no wheezes. She has no rhonchi. She has no rales. Abdominal: Soft. Bowel sounds are normal.   Musculoskeletal: Normal range of motion. Neurological: She is alert and oriented to person, place, and time. Coordination normal.   Skin: Skin is warm and dry. Psychiatric: She has a normal mood and affect.  Her behavior is normal. Judgment and thought content normal.     ASSESSMENT and PLAN  Diagnoses and all orders for this visit:    1. Chronic pain of both shoulders  -     XR SHOULDER LT 1 V; Future  -     XR SHOULDER RT 1V; Future    2. Hypercholesterolemia  -     atorvastatin (LIPITOR) 40 mg tablet; Take 1 Tab by mouth daily.       Follow-up Disposition: as indicated

## 2017-07-27 ENCOUNTER — HOSPITAL ENCOUNTER (OUTPATIENT)
Dept: GENERAL RADIOLOGY | Age: 82
Discharge: HOME | End: 2017-07-27
Payer: MEDICAID

## 2017-07-27 DIAGNOSIS — G89.29 CHRONIC PAIN OF BOTH SHOULDERS: ICD-10-CM

## 2017-07-27 DIAGNOSIS — M25.511 CHRONIC PAIN OF BOTH SHOULDERS: ICD-10-CM

## 2017-07-27 DIAGNOSIS — M25.512 CHRONIC PAIN OF BOTH SHOULDERS: ICD-10-CM

## 2017-07-27 PROCEDURE — 73020 X-RAY EXAM OF SHOULDER: CPT

## 2017-08-09 NOTE — PROGRESS NOTES
Unable to leave a voicemail for patient to return my call regarding DR. Ramirez comments on results listed.

## 2017-08-11 ENCOUNTER — TELEPHONE (OUTPATIENT)
Dept: FAMILY MEDICINE CLINIC | Age: 82
End: 2017-08-11

## 2017-08-11 DIAGNOSIS — M19.012 OSTEOARTHRITIS OF BOTH SHOULDERS, UNSPECIFIED OSTEOARTHRITIS TYPE: Primary | ICD-10-CM

## 2017-08-11 DIAGNOSIS — M25.512 CHRONIC PAIN OF BOTH SHOULDERS: ICD-10-CM

## 2017-08-11 DIAGNOSIS — G89.29 CHRONIC PAIN OF BOTH SHOULDERS: ICD-10-CM

## 2017-08-11 DIAGNOSIS — M25.511 CHRONIC PAIN OF BOTH SHOULDERS: ICD-10-CM

## 2017-08-11 DIAGNOSIS — M19.011 OSTEOARTHRITIS OF BOTH SHOULDERS, UNSPECIFIED OSTEOARTHRITIS TYPE: Primary | ICD-10-CM

## 2017-09-18 ENCOUNTER — OFFICE VISIT (OUTPATIENT)
Dept: ORTHOPEDIC SURGERY | Age: 82
End: 2017-09-18

## 2017-09-18 VITALS
RESPIRATION RATE: 18 BRPM | DIASTOLIC BLOOD PRESSURE: 66 MMHG | HEART RATE: 67 BPM | WEIGHT: 237.4 LBS | BODY MASS INDEX: 35.16 KG/M2 | TEMPERATURE: 97.8 F | OXYGEN SATURATION: 98 % | SYSTOLIC BLOOD PRESSURE: 132 MMHG | HEIGHT: 69 IN

## 2017-09-18 DIAGNOSIS — M12.811 ROTATOR CUFF TEAR ARTHROPATHY OF BOTH SHOULDERS: ICD-10-CM

## 2017-09-18 DIAGNOSIS — M19.011 PRIMARY OSTEOARTHRITIS OF BOTH SHOULDERS: Primary | ICD-10-CM

## 2017-09-18 DIAGNOSIS — M19.012 PRIMARY OSTEOARTHRITIS OF BOTH SHOULDERS: Primary | ICD-10-CM

## 2017-09-18 DIAGNOSIS — M25.511 RIGHT SHOULDER PAIN, UNSPECIFIED CHRONICITY: ICD-10-CM

## 2017-09-18 DIAGNOSIS — M25.512 LEFT SHOULDER PAIN, UNSPECIFIED CHRONICITY: ICD-10-CM

## 2017-09-18 DIAGNOSIS — M75.101 ROTATOR CUFF TEAR ARTHROPATHY OF BOTH SHOULDERS: ICD-10-CM

## 2017-09-18 DIAGNOSIS — M75.102 ROTATOR CUFF TEAR ARTHROPATHY OF BOTH SHOULDERS: ICD-10-CM

## 2017-09-18 DIAGNOSIS — M12.812 ROTATOR CUFF TEAR ARTHROPATHY OF BOTH SHOULDERS: ICD-10-CM

## 2017-09-18 RX ORDER — TRIAMCINOLONE ACETONIDE 40 MG/ML
40 INJECTION, SUSPENSION INTRA-ARTICULAR; INTRAMUSCULAR ONCE
Qty: 1 ML | Refills: 0
Start: 2017-09-18 | End: 2017-09-18

## 2017-09-18 NOTE — PATIENT INSTRUCTIONS
Arthritis: Care Instructions  Your Care Instructions  Arthritis, also called osteoarthritis, is a breakdown of the cartilage that cushions your joints. When the cartilage wears down, your bones rub against each other. This causes pain and stiffness. Many people have some arthritis as they age. Arthritis most often affects the joints of the spine, hands, hips, knees, or feet. You can take simple measures to protect your joints, ease your pain, and help you stay active. Follow-up care is a key part of your treatment and safety. Be sure to make and go to all appointments, and call your doctor if you are having problems. It's also a good idea to know your test results and keep a list of the medicines you take. How can you care for yourself at home? · Stay at a healthy weight. Being overweight puts extra strain on your joints. · Talk to your doctor or physical therapist about exercises that will help ease joint pain. ¨ Stretch. You may enjoy gentle forms of yoga to help keep your joints and muscles flexible. ¨ Walk instead of jog. Other types of exercise that are less stressful on the joints include riding a bicycle, swimming, nurys chi, or water exercise. ¨ Lift weights. Strong muscles help reduce stress on your joints. Stronger thigh muscles, for example, take some of the stress off of the knees and hips. Learn the right way to lift weights so you do not make joint pain worse. · Take your medicines exactly as prescribed. Call your doctor if you think you are having a problem with your medicine. · Take pain medicines exactly as directed. ¨ If the doctor gave you a prescription medicine for pain, take it as prescribed. ¨ If you are not taking a prescription pain medicine, ask your doctor if you can take an over-the-counter medicine. · Use a cane, crutch, walker, or another device if you need help to get around. These can help rest your joints.  You also can use other things to make life easier, such as a higher toilet seat and padded handles on kitchen utensils. · Do not sit in low chairs, which can make it hard to get up. · Put heat or cold on your sore joints as needed. Use whichever helps you most. You also can take turns with hot and cold packs. ¨ Apply heat 2 or 3 times a day for 20 to 30 minutes--using a heating pad, hot shower, or hot pack--to relieve pain and stiffness. ¨ Put ice or a cold pack on your sore joint for 10 to 20 minutes at a time. Put a thin cloth between the ice and your skin. When should you call for help? Call your doctor now or seek immediate medical care if:  · You have sudden swelling, warmth, or pain in any joint. · You have joint pain and a fever or rash. · You have such bad pain that you cannot use a joint. Watch closely for changes in your health, and be sure to contact your doctor if:  · You have mild joint symptoms that continue even with more than 6 weeks of care at home. · You have stomach pain or other problems with your medicine. Where can you learn more? Go to http://nita-kady.info/. Enter V017 in the search box to learn more about \"Arthritis: Care Instructions. \"  Current as of: November 28, 2016  Content Version: 11.3  © 6735-4099 [employer]. Care instructions adapted under license by [employer] (which disclaims liability or warranty for this information). If you have questions about a medical condition or this instruction, always ask your healthcare professional. [place] [address] any warranty or liability for your use of this information.

## 2017-09-18 NOTE — PROGRESS NOTES
Pamela Mills  1935   Chief Complaint   Patient presents with    Shoulder Pain     Adeel        HISTORY OF PRESENT ILLNESS  Pamela Mills is a [de-identified] y.o. female who presents today for evaluation of B/L shoulder pain. Patient was referred by Dr. Almita Mancuso for further evaluation. she rates her pain 10/10 today. Pain has been present since April 2017. Patient describes the pain as aching, sharp, stabbing and throbbing that is Constant in nature. Symptoms are worse with movement of the arms and is better with  Rest. Associated symptoms include stiffness. She is unable to raise the right shoulder. Since problem started, it: has worsened. Pain does wake patient up at night. Has taken topicals and celebrex for the problem. Has tried following treatments: Injections:NO; Brace:NO; Therapy:NO; Cane/Crutch:NO       No Known Allergies     Past Medical History:   Diagnosis Date    Decreased calculated GFR 12/5/2014    Dyslipidemia     High vitamin D level 12/6/2014    Vitamin D 25-Hydroxy (12/06/2014) = 138.9     History of echocardiogram 08/29/2014    EF 60%. No WMA. Mild conc LVH. Gr 1 DDfx. Mild RVE. Mild CATRACHO. Sm right & sm left pleural effusion.  History of kidney stones     Hypercholesterolemia     Lower extremity venous duplex 10/02/2014    No DVT bilaterally.  Numbness in both hands     Osteoarthritis     Osteoarthritis of right hip     Osteoporosis     Peripheral neuropathy (HCC)     Peripheral vascular disease (HCC)     Postoperative anemia due to acute blood loss     Vaginal fibroids     resolved s/p hyst    Varicose veins       Social History     Social History    Marital status:      Spouse name: N/A    Number of children: N/A    Years of education: N/A     Occupational History    Not on file.      Social History Main Topics    Smoking status: Former Smoker     Years: 1.00     Types: Cigarettes     Quit date: 1/1/1982    Smokeless tobacco: Never Used    Alcohol use No      Comment: Quit alcohol in 1982    Drug use: Yes     Special: Prescription, OTC      Comment: prescribed    Sexual activity: No     Other Topics Concern    Not on file     Social History Narrative      Past Surgical History:   Procedure Laterality Date    HX CHOLECYSTECTOMY      HX HERNIA REPAIR  2010    abdomina,had 2nd surgery for infection    HX HIP REPLACEMENT Right 12/02/2014    S/P Right total hip replacement (12/02/2014 - Dr. Dino Cosme)   [address] HX KNEE REPLACEMENT      TOTAL KNEE ARTHROPLASTY  2006    left    TOTAL KNEE ARTHROPLASTY  2001    right    VASCULAR SURGERY PROCEDURE UNLIST      Bilateral leg vein stripping      Family History   Problem Relation Age of Onset   [facility] Arthritis-rheumatoid Mother     Heart Attack Mother     Arthritis-rheumatoid Father     Other Father      gangrene    Arthritis-osteo Sister     Other Brother      pna    Arthritis-osteo Brother     Cancer Daughter      in remission    Arthritis-osteo Brother     Diabetes Brother     Arthritis-osteo Sister       Current Outpatient Prescriptions   Medication Sig    triamcinolone acetonide (KENALOG) 40 mg/mL injection 1 mL by IntraMUSCular route once for 1 dose.  atorvastatin (LIPITOR) 40 mg tablet Take 1 Tab by mouth daily.  miscellaneous medical supply misc DISPENSE (1) BEDSIDE COMMODE. DX:M15.9,Z74.09,Z96.641,M62.81,Z91.81    furosemide (LASIX) 20 mg tablet Take 1 Tab by mouth daily. Indications: Edema    celecoxib (CELEBREX) 200 mg capsule Take 1 Cap by mouth two (2) times daily as needed for Pain.  VITAMIN D3 1,000 unit tablet     gabapentin (NEURONTIN) 300 mg capsule Take 300 mg by mouth three (3) times daily.  cyanocobalamin (VITAMIN B-12) 500 mcg tablet Take 500 mcg by mouth daily.  HYDROcodone-acetaminophen (NORCO) 5-325 mg per tablet     polyethylene glycol (MIRALAX) 17 gram packet Take 1 Packet by mouth daily.      No current facility-administered medications for this visit. REVIEW OF SYSTEM   Patient denies: Weight loss, Fever/Chills, HA, Visual changes, Fatigue, Chest pain, SOB, Abdominal pain, N/V/D/C, Blood in stool or urine, Edema. Pertinent positive as above in HPI. All others were negative    PHYSICAL EXAM:   Visit Vitals    /66    Pulse 67    Temp 97.8 °F (36.6 °C) (Oral)    Resp 18    Ht 5' 9\" (1.753 m)    Wt 237 lb 6.4 oz (107.7 kg)    LMP 12/31/1985    SpO2 98%    BMI 35.06 kg/m2     The patient is a well-developed, well-nourished female   in no acute distress. The patient is alert and oriented times three. The patient is alert and oriented times three. Mood and affect are normal.  LYMPHATIC: lymph nodes are not enlarged and are within normal limits  SKIN: normal in color and non tender to palpation. There are no bruises or abrasions noted. NEUROLOGICAL: Motor sensory exam is within normal limits. Reflexes are equal bilaterally. There is normal sensation to pinprick and light touch  MUSCULOSKELETAL:  Examination Left shoulder Right shoulder   Skin Intact Intact   AC joint tenderness - -   Biceps tenderness - -   Forward flexion/Elevation ROM 60 30   Active abduction ROM 60 30   Glenohumeral abduction 45 45   External rotation ROM 0 0   Internal rotation ROM 30 30   Apprehension - -   Salinass Relocation - -   Jerk - -   Load and Shift - -   Obriens - -   Speeds - -   Impingement sign + +   Supraspinatus/Empty Can + +   External Rotation Strength -, 5/5 -, 5/5   Lift Off/Belly Press -, 5/5 -, 5/5   Neurovascular Intact Intact          PROCEDURE: After sterile prep, 6 cc of Xylocaine and 1 cc of Kenalog were injected into the bilateral  shoulders.        [facility] - [facility]  OFFICE PROCEDURE PROGRESS NOTE        Chart reviewed for the following:  Bruna Cantu MD, have reviewed the History, Physical and updated the Allergic reactions for [facility] [place] performed immediately prior to start of procedure:  Genny Chaparro MD, have performed the following reviews on Heddy Grounds prior to the start of the procedure:            * Patient was identified by name and date of birth   * Agreement on procedure being performed was verified  * Risks and Benefits explained to the patient  * Procedure site verified and marked as necessary  * Patient was positioned for comfort  * Consent was signed and verified     Time: 4:09 PM    Date of procedure: 9/18/2017    Procedure performed by:  Siria Spears MD    Provider assisted by: (see medication administration)    How tolerated by patient: tolerated the procedure well with no complications    Comments: none      IMAGING:   XR of the B/L shoulders dated 9/18/17 was reviewed and read: marked degenerative changes with proximal migration of the humeral head consistent with cuff tear arthropathy      IMPRESSION:      ICD-10-CM ICD-9-CM    1. Primary osteoarthritis of both shoulders M19.011 715.11 TRIAMCINOLONE ACETONIDE INJ    M19.012  triamcinolone acetonide (KENALOG) 40 mg/mL injection      DRAIN/INJECT LARGE JOINT/BURSA   2. Right shoulder pain, unspecified chronicity M25.511 719.41 AMB POC XRAY, SHOULDER; COMPLETE, 2+   3. Left shoulder pain, unspecified chronicity M25.512 719.41 AMB POC XRAY, SHOULDER; COMPLETE, 2+   4. Rotator cuff tear arthropathy of both shoulders M12.811 716.81     M12.812          PLAN:  1. Patient experiencing B/L shoulder pain. I feel this is due to her XR documented degenerative changes. Discussed with her that she may need to consider shoulder replacement. Risk factors include: age  3. Yes cortisone injection indicated today: B/L SHOULDERS   3. No Physical/Occupational Therapy indicated today  4. No diagnostic test indicated today  5. No durable medical equipment indicated today  6. No referral indicated today   7. No medications indicated today  8.  No Narcotic indicated today     RTC 4 weeks if pain continues  Follow-up Disposition: Not on File    Scribed by Sloane Santos [facility]) as dictated by MD JOSE J Asencio, Dr. Luis Sommer, confirm that all documentation is accurate.     Luis Sommer M.D.   [facility]

## 2017-10-28 ENCOUNTER — HOSPITAL ENCOUNTER (EMERGENCY)
Age: 82
Discharge: HOME | End: 2017-10-28
Attending: EMERGENCY MEDICINE
Payer: MEDICAID

## 2017-10-28 ENCOUNTER — APPOINTMENT (OUTPATIENT)
Dept: GENERAL RADIOLOGY | Age: 82
End: 2017-10-28
Attending: PHYSICIAN ASSISTANT
Payer: MEDICAID

## 2017-10-28 VITALS
WEIGHT: 233 LBS | HEIGHT: 69 IN | SYSTOLIC BLOOD PRESSURE: 151 MMHG | TEMPERATURE: 98 F | HEART RATE: 88 BPM | BODY MASS INDEX: 34.51 KG/M2 | OXYGEN SATURATION: 97 % | DIASTOLIC BLOOD PRESSURE: 87 MMHG | RESPIRATION RATE: 20 BRPM

## 2017-10-28 DIAGNOSIS — R05.9 COUGH: ICD-10-CM

## 2017-10-28 DIAGNOSIS — J06.9 ACUTE UPPER RESPIRATORY INFECTION: Primary | ICD-10-CM

## 2017-10-28 DIAGNOSIS — J02.9 ACUTE PHARYNGITIS, UNSPECIFIED ETIOLOGY: ICD-10-CM

## 2017-10-28 PROCEDURE — 71020 XR CHEST PA LAT: CPT

## 2017-10-28 PROCEDURE — 99282 EMERGENCY DEPT VISIT SF MDM: CPT

## 2017-10-28 RX ORDER — FLUTICASONE PROPIONATE 50 MCG
2 SPRAY, SUSPENSION (ML) NASAL DAILY
Qty: 1 BOTTLE | Refills: 0 | Status: SHIPPED | OUTPATIENT
Start: 2017-10-28 | End: 2018-03-16 | Stop reason: SDUPTHER

## 2017-10-28 RX ORDER — PREDNISONE 50 MG/1
50 TABLET ORAL DAILY
Qty: 3 TAB | Refills: 0 | Status: SHIPPED | OUTPATIENT
Start: 2017-10-28 | End: 2017-10-31

## 2017-10-28 NOTE — ED NOTES
Patient given copy of dc instructions and script(s). Patient verbalized understanding of instructions and script (s). Patient given a current medication reconciliation form and verbalized understanding of their medications. Patient verbalized understanding of the importance of discussing medications with  his or her physician or clinic they will be following up with. Patient alert and oriented and in no acute distress. Patient discharged home ambulatory with self and son.

## 2017-10-28 NOTE — DISCHARGE INSTRUCTIONS
Cough: Care Instructions  Your Care Instructions    A cough is your body's response to something that bothers your throat or airways. Many things can cause a cough. You might cough because of a cold or the flu, bronchitis, or asthma. Smoking, postnasal drip, allergies, and stomach acid that backs up into your throat also can cause coughs. A cough is a symptom, not a disease. Most coughs stop when the cause, such as a cold, goes away. You can take a few steps at home to cough less and feel better. Follow-up care is a key part of your treatment and safety. Be sure to make and go to all appointments, and call your doctor if you are having problems. It's also a good idea to know your test results and keep a list of the medicines you take. How can you care for yourself at home? · Drink lots of water and other fluids. This helps thin the mucus and soothes a dry or sore throat. Honey or lemon juice in hot water or tea may ease a dry cough. · Take cough medicine as directed by your doctor. · Prop up your head on pillows to help you breathe and ease a dry cough. · Try cough drops to soothe a dry or sore throat. Cough drops don't stop a cough. Medicine-flavored cough drops are no better than candy-flavored drops or hard candy. · Do not smoke. Avoid secondhand smoke. If you need help quitting, talk to your doctor about stop-smoking programs and medicines. These can increase your chances of quitting for good. When should you call for help? Call 911 anytime you think you may need emergency care. For example, call if:  ? · You have severe trouble breathing. ?Call your doctor now or seek immediate medical care if:  ? · You cough up blood. ? · You have new or worse trouble breathing. ? · You have a new or higher fever. ? · You have a new rash. ? Watch closely for changes in your health, and be sure to contact your doctor if:  ? · You cough more deeply or more often, especially if you notice more mucus or a change in the color of your mucus. ? · You have new symptoms, such as a sore throat, an earache, or sinus pain. ? · You do not get better as expected. Where can you learn more? Go to http://nita-kady.info/. Enter D279 in the search box to learn more about \"Cough: Care Instructions. \"  Current as of: May 12, 2017  Content Version: 11.4  © 5905-2291 [employer]. Care instructions adapted under license by [employer] (which disclaims liability or warranty for this information). If you have questions about a medical condition or this instruction, always ask your healthcare professional. [place] [address] any warranty or liability for your use of this information. Sore Throat: Care Instructions  Your Care Instructions    Infection by bacteria or a virus causes most sore throats. Cigarette smoke, dry air, air pollution, allergies, and yelling can also cause a sore throat. Sore throats can be painful and annoying. Fortunately, most sore throats go away on their own. If you have a bacterial infection, your doctor may prescribe antibiotics. Follow-up care is a key part of your treatment and safety. Be sure to make and go to all appointments, and call your doctor if you are having problems. It's also a good idea to know your test results and keep a list of the medicines you take. How can you care for yourself at home? · If your doctor prescribed antibiotics, take them as directed. Do not stop taking them just because you feel better. You need to take the full course of antibiotics. · Gargle with warm salt water once an hour to help reduce swelling and relieve discomfort. Use 1 teaspoon of salt mixed in 1 cup of warm water. · Take an over-the-counter pain medicine, such as acetaminophen (Tylenol), ibuprofen (Advil, Motrin), or naproxen (Aleve). Read and follow all instructions on the label.   · Be careful when taking over-the-counter cold or flu medicines and Tylenol at the same time. Many of these medicines have acetaminophen, which is Tylenol. Read the labels to make sure that you are not taking more than the recommended dose. Too much acetaminophen (Tylenol) can be harmful. · Drink plenty of fluids. Fluids may help soothe an irritated throat. Hot fluids, such as tea or soup, may help decrease throat pain. · Use over-the-counter throat lozenges to soothe pain. Regular cough drops or hard candy may also help. These should not be given to young children because of the risk of choking. · Do not smoke or allow others to smoke around you. If you need help quitting, talk to your doctor about stop-smoking programs and medicines. These can increase your chances of quitting for good. · Use a vaporizer or humidifier to add moisture to your bedroom. Follow the directions for cleaning the machine. When should you call for help? Call your doctor now or seek immediate medical care if:  ? · You have new or worse trouble swallowing. ? · Your sore throat gets much worse on one side. ? Watch closely for changes in your health, and be sure to contact your doctor if you do not get better as expected. Where can you learn more? Go to http://nita-kady.info/. Enter 062 [id number] in the search box to learn more about \"Sore Throat: Care Instructions. \"  Current as of: May 12, 2017  Content Version: 11.4  © 7947-1482 [employer]. Care instructions adapted under license by [employer] (which disclaims liability or warranty for this information). If you have questions about a medical condition or this instruction, always ask your healthcare professional. [place] [address] any warranty or liability for your use of this information. Upper Respiratory Infection (Cold): Care Instructions  Your Care Instructions    An upper respiratory infection, or URI, is an infection of the nose, sinuses, or throat.  URIs are spread by coughs, sneezes, and direct contact. The common cold is the most frequent kind of URI. The flu and sinus infections are other kinds of URIs. Almost all URIs are caused by viruses. Antibiotics won't cure them. But you can treat most infections with home care. This may include drinking lots of fluids and taking over-the-counter pain medicine. You will probably feel better in 4 to 10 days. The doctor has checked you carefully, but problems can develop later. If you notice any problems or new symptoms, get medical treatment right away. Follow-up care is a key part of your treatment and safety. Be sure to make and go to all appointments, and call your doctor if you are having problems. It's also a good idea to know your test results and keep a list of the medicines you take. How can you care for yourself at home? · To prevent dehydration, drink plenty of fluids, enough so that your urine is light yellow or clear like water. Choose water and other caffeine-free clear liquids until you feel better. If you have kidney, heart, or liver disease and have to limit fluids, talk with your doctor before you increase the amount of fluids you drink. · Take an over-the-counter pain medicine, such as acetaminophen (Tylenol), ibuprofen (Advil, Motrin), or naproxen (Aleve). Read and follow all instructions on the label. · Before you use cough and cold medicines, check the label. These medicines may not be safe for young children or for people with certain health problems. · Be careful when taking over-the-counter cold or flu medicines and Tylenol at the same time. Many of these medicines have acetaminophen, which is Tylenol. Read the labels to make sure that you are not taking more than the recommended dose. Too much acetaminophen (Tylenol) can be harmful. · Get plenty of rest.  · Do not smoke or allow others to smoke around you. If you need help quitting, talk to your doctor about stop-smoking programs and medicines. These can increase your chances of quitting for good. When should you call for help? Call 911 anytime you think you may need emergency care. For example, call if:  ? · You have severe trouble breathing. ?Call your doctor now or seek immediate medical care if:  ? · You seem to be getting much sicker. ? · You have new or worse trouble breathing. ? · You have a new or higher fever. ? · You have a new rash. ? Watch closely for changes in your health, and be sure to contact your doctor if:  ? · You have a new symptom, such as a sore throat, an earache, or sinus pain. ? · You cough more deeply or more often, especially if you notice more mucus or a change in the color of your mucus. ? · You do not get better as expected. Where can you learn more? Go to http://nita-kady.info/. Enter C761 in the search box to learn more about \"Upper Respiratory Infection (Cold): Care Instructions. \"  Current as of: May 12, 2017  Content Version: 11.4  © 6503-2756 [employer]. Care instructions adapted under license by [employer] (which disclaims liability or warranty for this information). If you have questions about a medical condition or this instruction, always ask your healthcare professional. [address] any warranty or liability for your use of this information.

## 2017-10-28 NOTE — ED TRIAGE NOTES
Pt c/o nasal congestion and cough since Monday. Has been taking Sudafed. States coughing up gray stuff.  C/o drainage down the back of her throat

## 2017-10-28 NOTE — ED PROVIDER NOTES
HPI Comments: Lev Barriga is a [de-identified] y.o. female that presents to the ED with a complaint of congestion, post nasal drip, sore throat and cough. Pt states that cough is productive for grey sputum. Sx have been presents x1 week. Sore throat is worse with swallowing and voice in turning hoarse. She has been taking Sudafed and Cepacol with some relief. She denies fever, NVD, SOB, CP or urinary sx. No other complaints at this time    Patient is a [de-identified] y.o. female presenting with nasal congestion and cough. Nasal Congestion   Pertinent negatives include no chest pain, no abdominal pain, no headaches and no shortness of breath. Cough   Associated symptoms include sore throat. Pertinent negatives include no chest pain, no headaches, no shortness of breath, no nausea and no vomiting. Past Medical History:   Diagnosis Date    Decreased calculated GFR 12/5/2014    Dyslipidemia     High vitamin D level 12/6/2014    Vitamin D 25-Hydroxy (12/06/2014) = 138.9     History of echocardiogram 08/29/2014    EF 60%. No WMA. Mild conc LVH. Gr 1 DDfx. Mild RVE. Mild CATRACHO. Sm right & sm left pleural effusion.  History of kidney stones     Hypercholesterolemia     Lower extremity venous duplex 10/02/2014    No DVT bilaterally.     Numbness in both hands     Osteoarthritis     Osteoarthritis of right hip     Osteoporosis     Peripheral neuropathy     Peripheral vascular disease (HCC)     Postoperative anemia due to acute blood loss     Vaginal fibroids     resolved s/p hyst    Varicose veins        Past Surgical History:   Procedure Laterality Date    HX CHOLECYSTECTOMY      HX HERNIA REPAIR  2010    abdomina,had 2nd surgery for infection    HX HIP REPLACEMENT Right 12/02/2014    S/P Right total hip replacement (12/02/2014 - Dr. Justin Shepherd)   [address] HX KNEE REPLACEMENT      TOTAL KNEE ARTHROPLASTY  2006    left    TOTAL KNEE ARTHROPLASTY  2001    right    VASCULAR SURGERY PROCEDURE UNLIST      Bilateral leg vein stripping         Family History:   Problem Relation Age of Onset   Nohemi Rivero Arthritis-rheumatoid Mother     Heart Attack Mother    Nohemi Rivero Arthritis-rheumatoid Father     Other Father      gangrene   Nohemi Rivero Arthritis-osteo Sister     Other Brother      pna    Arthritis-osteo Brother     Cancer Daughter      in remission    Arthritis-osteo Brother     Diabetes Brother     Arthritis-osteo Sister        Social History     Social History    Marital status:      Spouse name: N/A    Number of children: N/A    Years of education: N/A     Occupational History    Not on file. Social History Main Topics    Smoking status: Former Smoker     Years: 1.00     Types: Cigarettes     Quit date: 1/1/1982    Smokeless tobacco: Never Used    Alcohol use No      Comment: Quit alcohol in 1982    Drug use: Yes     Special: Prescription, OTC      Comment: prescribed    Sexual activity: No     Other Topics Concern    Not on file     Social History Narrative         ALLERGIES: Review of patient's allergies indicates no known allergies. Review of Systems   Constitutional: Negative for fatigue and fever. HENT: Positive for congestion and sore throat. Respiratory: Positive for cough. Negative for shortness of breath. Cardiovascular: Negative for chest pain. Gastrointestinal: Negative for abdominal pain, diarrhea, nausea and vomiting. Genitourinary: Negative for difficulty urinating and dysuria. Musculoskeletal: Negative for back pain. Neurological: Negative for headaches. All other systems reviewed and are negative. Vitals:    10/28/17 1550   BP: 151/87   Pulse: 88   Resp: 20   Temp: 98 °F (36.7 °C)   SpO2: 97%   Weight: 105.7 kg (233 lb)   Height: 5' 9\" (1.753 m)            Physical Exam   Constitutional: She is oriented to person, place, and time. She appears well-developed and well-nourished. HENT:   Head: Normocephalic and atraumatic.    Right Ear: Hearing, tympanic membrane, external ear and ear canal normal. Tympanic membrane is not injected. Left Ear: Hearing, tympanic membrane, external ear and ear canal normal. Tympanic membrane is not injected. Nose: Mucosal edema and rhinorrhea present. Mouth/Throat: Mucous membranes are normal. She has dentures. Normal dentition. No dental abscesses, uvula swelling or dental caries. Posterior oropharyngeal erythema present. Eyes: Pupils are equal, round, and reactive to light. Neck: Normal range of motion. Cardiovascular: Normal rate, regular rhythm and normal heart sounds. Pulmonary/Chest: Effort normal and breath sounds normal. No respiratory distress. She has no wheezes. Musculoskeletal: Normal range of motion. Neurological: She is alert and oriented to person, place, and time. Skin: Skin is warm and dry. Psychiatric: She has a normal mood and affect. Her behavior is normal.   Vitals reviewed. MDM  Number of Diagnoses or Management Options  Diagnosis management comments: CXR: negative for acute process    Impression: URI, cough    Plan: discharge home  Resume home medications  Flonase daily  Prednisone as prescribed  Follow up with PCP       Amount and/or Complexity of Data Reviewed  Tests in the radiology section of CPT®: ordered and reviewed    Risk of Complications, Morbidity, and/or Mortality  Presenting problems: moderate  Diagnostic procedures: moderate  Management options: moderate    Patient Progress  Patient progress: stable    ED Course       Procedures                       Vitals:  Patient Vitals for the past 12 hrs:   Temp Pulse Resp BP SpO2   10/28/17 1550 98 °F (36.7 °C) 88 20 151/87 97 %         Medications ordered:   Medications - No data to display      Lab findings:  No results found for this or any previous visit (from the past 12 hour(s)).         X-Ray, CT or other radiology findings or impressions:  XR CHEST PA LAT    (Results Pending)       Progress notes, Consult notes or additional Procedure notes:       Disposition:  Diagnosis: No diagnosis found. Disposition: discharge    Follow-up Information     None           Patient's Medications   Start Taking    No medications on file   Continue Taking    ATORVASTATIN (LIPITOR) 40 MG TABLET    Take 1 Tab by mouth daily. CELECOXIB (CELEBREX) 200 MG CAPSULE    Take 1 Cap by mouth two (2) times daily as needed for Pain. CYANOCOBALAMIN (VITAMIN B-12) 500 MCG TABLET    Take 500 mcg by mouth daily. FUROSEMIDE (LASIX) 20 MG TABLET    Take 1 Tab by mouth daily. Indications: Edema    GABAPENTIN (NEURONTIN) 300 MG CAPSULE    Take 300 mg by mouth three (3) times daily. MISCELLANEOUS MEDICAL SUPPLY MISC    DISPENSE (1) BEDSIDE COMMODE. DX:M15.9,Z74.09,Z96.641,M62.81,Z91.81    VITAMIN D3 1,000 UNIT TABLET       These Medications have changed    No medications on file   Stop Taking    HYDROCODONE-ACETAMINOPHEN (NORCO) 5-325 MG PER TABLET        POLYETHYLENE GLYCOL (MIRALAX) 17 GRAM PACKET    Take 1 Packet by mouth daily.

## 2017-10-30 DIAGNOSIS — M15.9 PRIMARY OSTEOARTHRITIS INVOLVING MULTIPLE JOINTS: Chronic | ICD-10-CM

## 2017-10-30 DIAGNOSIS — E78.00 HYPERCHOLESTEROLEMIA: ICD-10-CM

## 2017-10-30 RX ORDER — ATORVASTATIN CALCIUM 40 MG/1
40 TABLET, FILM COATED ORAL DAILY
Qty: 30 TAB | Refills: 5 | Status: SHIPPED | OUTPATIENT
Start: 2017-10-30 | End: 2018-03-16 | Stop reason: SDUPTHER

## 2017-10-30 RX ORDER — CELECOXIB 200 MG/1
200 CAPSULE ORAL
Qty: 60 CAP | Refills: 5 | Status: SHIPPED | OUTPATIENT
Start: 2017-10-30 | End: 2018-03-16 | Stop reason: SDUPTHER

## 2017-10-30 NOTE — TELEPHONE ENCOUNTER
Pt called requesting refill for medication . Requested Prescriptions     Pending Prescriptions Disp Refills    atorvastatin (LIPITOR) 40 mg tablet 30 Tab 5     Sig: Take 1 Tab by mouth daily.  celecoxib (CELEBREX) 200 mg capsule 60 Cap 5     Sig: Take 1 Cap by mouth two (2) times daily as needed for Pain.

## 2017-11-29 ENCOUNTER — HOSPITAL ENCOUNTER (OUTPATIENT)
Dept: LAB | Age: 82
Discharge: HOME | End: 2017-11-29

## 2017-11-29 ENCOUNTER — OFFICE VISIT (OUTPATIENT)
Dept: FAMILY MEDICINE CLINIC | Age: 82
End: 2017-11-29

## 2017-11-29 VITALS
HEART RATE: 70 BPM | BODY MASS INDEX: 34.36 KG/M2 | HEIGHT: 69 IN | TEMPERATURE: 98.1 F | OXYGEN SATURATION: 99 % | WEIGHT: 232 LBS | SYSTOLIC BLOOD PRESSURE: 115 MMHG | RESPIRATION RATE: 14 BRPM | DIASTOLIC BLOOD PRESSURE: 63 MMHG

## 2017-11-29 DIAGNOSIS — Z51.81 MEDICATION MONITORING ENCOUNTER: ICD-10-CM

## 2017-11-29 DIAGNOSIS — R60.0 BILATERAL LEG EDEMA: ICD-10-CM

## 2017-11-29 DIAGNOSIS — L03.116 CELLULITIS OF LEFT LOWER EXTREMITY: ICD-10-CM

## 2017-11-29 DIAGNOSIS — E78.00 HYPERCHOLESTEROLEMIA: Primary | ICD-10-CM

## 2017-11-29 PROCEDURE — 99001 SPECIMEN HANDLING PT-LAB: CPT | Performed by: FAMILY MEDICINE

## 2017-11-29 RX ORDER — CEPHALEXIN 750 MG/1
750 CAPSULE ORAL 4 TIMES DAILY
Qty: 40 CAP | Refills: 0 | Status: SHIPPED | OUTPATIENT
Start: 2017-11-29 | End: 2017-12-09

## 2017-11-29 RX ORDER — CEPHALEXIN 250 MG/1
750 CAPSULE ORAL DAILY
COMMUNITY
End: 2017-11-29 | Stop reason: SDUPTHER

## 2017-11-29 NOTE — PROGRESS NOTES
Yovanny Vigil [de-identified] y.o. female   Chief Complaint   Patient presents with    Follow-up    Cholesterol Problem    Other     renal function impaired     Osteoarthritis         1. Have you been to the ER, urgent care clinic since your last visit? Hospitalized since your last visit? No    2. Have you seen or consulted any other health care providers outside of the [address] [place] [address] since your last visit? Include any pap smears or colon screening.  No

## 2017-11-29 NOTE — PROGRESS NOTES
HISTORY OF PRESENT ILLNESS  Mercedes Jeter is a [de-identified] y.o. female. Chief Complaint   Patient presents with    Follow-up    Cholesterol Problem    Other     renal function impaired     Osteoarthritis    Laceration     left ankle laceration with soreness, and drainage. HPI  Patient is here for a 3 month follow up of Lipids, OA, and Impaired renal function. she is having shoulder pain. Pt has seen ortho and was told that she has bone on bone OA. She is not interested in having surgery. She had a joint injection that was helpful. She is considering returning for another injection. Patient does not need medication refills today. New concerns today: Patient reports having a laceration on the left ankle with soreness and drainage. She is not aware of any injury. She just noticed that there was drainage. Review of Systems   Constitutional: Negative. HENT: Negative. Respiratory: Negative. Cardiovascular: Negative. Musculoskeletal: Positive for joint pain. Skin:        Sore on L lateral ankle   All other systems reviewed and are negative. Physical Exam  Physical Exam   Nursing note and vitals reviewed. Constitutional: She is oriented to person, place, and time. She appears well-developed and well-nourished. HENT:   Head: Normocephalic and atraumatic. Right Ear: External ear normal.   Left Ear: External ear normal.   Nose: Nose normal.   Eyes: Conjunctivae and EOM are normal.   Neck: Normal range of motion. Neck supple. No JVD present. Carotid bruit is not present. No thyromegaly present. Cardiovascular: Normal rate, regular rhythm, normal heart sounds and intact distal pulses. Exam reveals no gallop and no friction rub. No murmur heard. Pulmonary/Chest: Effort normal and breath sounds normal. She has no wheezes. She has no rhonchi. She has no rales. Abdominal: Soft. Bowel sounds are normal.   Musculoskeletal: Normal range of motion.    Neurological: She is alert and oriented to person, place, and time. Coordination normal.   Skin: Skin is warm and dry. L lateral ankle: hyperpigmented area, no edema, no erythema, moderate ttp, no purulent drainage  Psychiatric: She has a normal mood and affect. Her behavior is normal. Judgment and thought content normal.     ASSESSMENT and PLAN  Diagnoses and all orders for this visit:    1. Hypercholesterolemia  Stable, cont pres tx plan. 2. Bilateral leg edema  -     METABOLIC PANEL, BASIC; Future  Cont lasix. 3. Cellulitis of left lower extremity  -     cephalexin (KEFLEX) 750 mg capsule; Take 1 Cap by mouth four (4) times daily for 10 days.     4. Medication monitoring encounter  -     METABOLIC PANEL, BASIC; Future      Follow-up Disposition: 3 months; sooner prn

## 2017-12-01 ENCOUNTER — TELEPHONE (OUTPATIENT)
Dept: CARDIOLOGY CLINIC | Age: 82
End: 2017-12-01

## 2017-12-01 LAB
BUN SERPL-MCNC: 15 MG/DL (ref 8–27)
BUN/CREAT SERPL: 18 (ref 12–28)
CALCIUM SERPL-MCNC: 9.3 MG/DL (ref 8.7–10.3)
CHLORIDE SERPL-SCNC: 103 MMOL/L (ref 96–106)
CO2 SERPL-SCNC: 27 MMOL/L (ref 18–29)
CREAT SERPL-MCNC: 0.85 MG/DL (ref 0.57–1)
GFR SERPLBLD CREATININE-BSD FMLA CKD-EPI: 65 ML/MIN/1.73
GFR SERPLBLD CREATININE-BSD FMLA CKD-EPI: 75 ML/MIN/1.73
GLUCOSE SERPL-MCNC: 77 MG/DL (ref 65–99)
POTASSIUM SERPL-SCNC: 4.6 MMOL/L (ref 3.5–5.2)
SODIUM SERPL-SCNC: 141 MMOL/L (ref 134–144)

## 2018-01-30 ENCOUNTER — OFFICE VISIT (OUTPATIENT)
Dept: ORTHOPEDIC SURGERY | Age: 83
End: 2018-01-30

## 2018-01-30 VITALS
SYSTOLIC BLOOD PRESSURE: 142 MMHG | WEIGHT: 232 LBS | TEMPERATURE: 96.4 F | DIASTOLIC BLOOD PRESSURE: 76 MMHG | BODY MASS INDEX: 34.36 KG/M2 | HEIGHT: 69 IN | HEART RATE: 65 BPM | RESPIRATION RATE: 14 BRPM | OXYGEN SATURATION: 98 %

## 2018-01-30 DIAGNOSIS — M19.011 PRIMARY OSTEOARTHRITIS OF BOTH SHOULDERS: Primary | ICD-10-CM

## 2018-01-30 DIAGNOSIS — M19.012 PRIMARY OSTEOARTHRITIS OF BOTH SHOULDERS: Primary | ICD-10-CM

## 2018-01-30 RX ORDER — TRIAMCINOLONE ACETONIDE 40 MG/ML
40 INJECTION, SUSPENSION INTRA-ARTICULAR; INTRAMUSCULAR ONCE
Qty: 1 ML | Refills: 0
Start: 2018-01-30 | End: 2018-01-30

## 2018-01-30 NOTE — MR AVS SNAPSHOT
[address], [address] 986 [facility] 
[phone] Patient: Nasima Junior MRN: [id number] Visit Information Date & Time Provider Department Dept. Phone Encounter #  
 1/30/2018  3:30 PM Anita Mcdonald MD [facility] [phone] Your Appointments 3/1/2018  1:00 PM  
Follow Up with Beronica Robbins MD  
[facility] (--) Appt Note: 3 month follow up [address] [id number] [address] [address] [phone]  
  
   
 [address] [phone] [address] [id number] Upcoming Health Maintenance Date Due ZOSTER VACCINE AGE 60> 4/30/2018* DTaP/Tdap/Td series (1 - Tdap) 4/30/2018* Pneumococcal 65+ Low/Medium Risk (2 of 2 - PPSV23) 4/4/2018 MEDICARE YEARLY EXAM 4/5/2018 GLAUCOMA SCREENING Q2Y 5/1/2019 *Topic was postponed. The date shown is not the original due date. Allergies as of 1/30/2018  Review Complete On: 1/30/2018 By: Anita Mcdonald MD  
 No Known Allergies Current Immunizations  Reviewed on 11/21/2014 No immunizations on file. Not reviewed this visit You Were Diagnosed With   
  
 Codes Comments Primary osteoarthritis of both shoulders    -  Primary ICD-10-CM: M19.011, M19.012 
ICD-9-CM: 715.11 Vitals BP Pulse Temp Resp Height(growth percentile) Weight(growth percentile) 142/76 65 96.4 °F (35.8 °C) (Oral) 14 5' 9\" (1.753 m) 232 lb (105.2 kg) LMP SpO2 BMI OB Status Smoking Status 12/31/1985 98% 34.26 kg/m2 Hysterectomy Former Smoker BMI and BSA Data Body Mass Index Body Surface Area  
 34.26 kg/m 2 2.26 m 2 Preferred Pharmacy Pharmacy Name Phone [facility] [address] [facility] Your Updated Medication List  
  
   
This list is accurate as of: 1/30/18  3:59 PM.  Always use your most recent med list.  
  
  
  
  
 atorvastatin 40 mg tablet Commonly known as:  LIPITOR Take 1 Tab by mouth daily. celecoxib 200 mg capsule Commonly known as:  CeleBREX Take 1 Cap by mouth two (2) times daily as needed for Pain. fluticasone 50 mcg/actuation nasal spray Commonly known as:  Celestia Katelin 2 Sprays by Both Nostrils route daily. furosemide 20 mg tablet Commonly known as:  LASIX Take 1 Tab by mouth daily. Indications: Edema  
  
 gabapentin 300 mg capsule Commonly known as:  NEURONTIN Take 300 mg by mouth three (3) times daily. miscellaneous medical supply Misc DISPENSE (1) BEDSIDE COMMODE. DX:M15.9,Z74.09,Z96.641,M62.81,Z91.81  
  
 VITAMIN B-12 500 mcg tablet Generic drug:  cyanocobalamin Take 500 mcg by mouth daily. VITAMIN D3 1,000 unit tablet Generic drug:  cholecalciferol Introducing [facility]! [facility] introduces [employer] patient portal. Now you can access parts of your medical record, email your doctor's office, and request medication refills online. 1. In your internet browser, go to [email or web address] 2. Click on the First Time User? Click Here link in the Sign In box. You will see the New Member Sign Up page. 3. Enter your [employer] Access Code exactly as it appears below. You will not need to use this code after youve completed the sign-up process. If you do not sign up before the expiration date, you must request a new code. · [employer] Access Code: [email or web address] Expires: 4/30/2018  3:59 PM 
 
4. Enter the last four digits of your Social Security Number (xxxx) and Date of Birth (mm/dd/yyyy) as indicated and click Submit. You will be taken to the next sign-up page. 5. Create a StyleHop ID. This will be your [employer]Hop login ID and cannot be changed, so think of one that is secure and easy to remember. 6. Create a [employer] password. You can change your password at any time. 7. Enter your Password Reset Question and Answer. This can be used at a later time if you forget your password. 8. Enter your e-mail address. You will receive e-mail notification when new information is available in [address] 9. Click Sign Up. You can now view and download portions of your medical record. 10. Click the Download Summary menu link to download a portable copy of your medical information. If you have questions, please visit the Frequently Asked Questions section of the [employer] website. Remember, [employer] is NOT to be used for urgent needs. For medical emergencies, dial 911. Now available from your iPhone and Android! Please provide this summary of care documentation to your next provider. Your primary care clinician is listed as Durga Leon. If you have any questions after today's visit, please call [phone].

## 2018-01-30 NOTE — PROGRESS NOTES
Lisseth Lynch  1935   Chief Complaint   Patient presents with    Shoulder Pain     Bilateral        HISTORY OF PRESENT ILLNESS  Lisseth Lynch is a [de-identified] y.o. female who presents today for reevaluation of b/l shoulder pain. Patient rates pain as 10/10 today. At last OV in September 2017, patient had a cortisone injection which provided good relief. She states that the pain has been returning. She has a popping sensation. Patient is requesting injections today. She ambulates with a rolling walker. Patient denies any fever, chills, chest pain, shortness of breath or calf pain. There are no new illness or injuries to report since last seen in the office. There are no changes to medications, allergies, family or social history. PHYSICAL EXAM:   Visit Vitals    /76    Pulse 65    Temp 96.4 °F (35.8 °C) (Oral)    Resp 14    Ht 5' 9\" (1.753 m)    Wt 232 lb (105.2 kg)    LMP 12/31/1985    SpO2 98%    BMI 34.26 kg/m2     The patient is a well-developed, well-nourished female   in no acute distress. The patient is alert and oriented times three. The patient is alert and oriented times three. Mood and affect are normal.  LYMPHATIC: lymph nodes are not enlarged and are within normal limits  SKIN: normal in color and non tender to palpation. There are no bruises or abrasions noted. NEUROLOGICAL: Motor sensory exam is within normal limits. Reflexes are equal bilaterally.  There is normal sensation to pinprick and light touch  MUSCULOSKELETAL:  Examination Left shoulder Right shoulder   Skin Intact Intact   AC joint tenderness - -   Biceps tenderness - -   Forward flexion/Elevation ROM 60 30   Active abduction ROM 60 30   Glenohumeral abduction 45 45   External rotation ROM 0 0   Internal rotation ROM 30 30   Apprehension - -   Salinass Relocation - -   Jerk - -   Load and Shift - -   Obriens - -   Speeds - -   Impingement sign + +   Supraspinatus/Empty Can + +   External Rotation Strength -, 5/5 -, 5/5 Lift Off/Belly Press -, 5/5 -, 5/5   Neurovascular Intact Intact        PROCEDURE: After sterile prep, 6 cc of Xylocaine and 1 cc of Kenalog were injected into the bilateral  shoulders. [facility]  OFFICE PROCEDURE PROGRESS NOTE        Chart reviewed for the following:  Lawrence Taylor MD, have reviewed the History, Physical and updated the Allergic reactions for [address] performed immediately prior to start of procedure:  Lawrence Taylor MD, have performed the following reviews on Sanjuana Callahan prior to the start of the procedure:            * Patient was identified by name and date of birth   * Agreement on procedure being performed was verified  * Risks and Benefits explained to the patient  * Procedure site verified and marked as necessary  * Patient was positioned for comfort  * Consent was signed and verified     Time: 3:43 PM    Date of procedure: 1/30/2018    Procedure performed by:  Bhumika Leonard MD    Provider assisted by: (see medication administration)    How tolerated by patient: tolerated the procedure well with no complications    Comments: none      IMAGING:   XR of the B/L shoulders dated 9/18/17 was reviewed and read: marked degenerative changes with proximal migration of the humeral head consistent with cuff tear arthropathy    IMPRESSION:      ICD-10-CM ICD-9-CM    1. Primary osteoarthritis of both shoulders M19.011 715.11 TRIAMCINOLONE ACETONIDE INJ    M19.012  triamcinolone acetonide (KENALOG) 40 mg/mL injection      DRAIN/INJECT LARGE JOINT/BURSA        PLAN:   1. Patient's b/l shoulder pain has been gradually returning. I am hopeful that we can continue to treat her symptoms with cortisone. I do not feel she is a good candidate for shoulder replacement due to her age. Risk factors include: n/a  2. Yes cortisone injection indicated today B/L SHOULDER  3.  No Physical/Occupational Therapy indicated today  4. No diagnostic test indicated today  5. No durable medical equipment indicated today  6. No referral indicated today   7. No medications indicated today  8. No Narcotic indicated today     RTC prn  Follow-up Disposition: Not on File    Scribed by Clair Carter Primes) as dictated by Anita Mcdonald MD    I, Dr. Anita Mcdonald, confirm that all documentation is accurate.     Anita Mcdonald M.D.   [facility]

## 2018-03-16 ENCOUNTER — OFFICE VISIT (OUTPATIENT)
Dept: FAMILY MEDICINE CLINIC | Age: 83
End: 2018-03-16

## 2018-03-16 VITALS
BODY MASS INDEX: 34.21 KG/M2 | WEIGHT: 231 LBS | HEART RATE: 71 BPM | HEIGHT: 69 IN | SYSTOLIC BLOOD PRESSURE: 134 MMHG | OXYGEN SATURATION: 98 % | TEMPERATURE: 97.1 F | RESPIRATION RATE: 18 BRPM | DIASTOLIC BLOOD PRESSURE: 68 MMHG

## 2018-03-16 DIAGNOSIS — E78.00 HYPERCHOLESTEROLEMIA: ICD-10-CM

## 2018-03-16 DIAGNOSIS — J30.2 CHRONIC SEASONAL ALLERGIC RHINITIS, UNSPECIFIED TRIGGER: Primary | ICD-10-CM

## 2018-03-16 DIAGNOSIS — M15.9 PRIMARY OSTEOARTHRITIS INVOLVING MULTIPLE JOINTS: Chronic | ICD-10-CM

## 2018-03-16 DIAGNOSIS — M19.011 PRIMARY OSTEOARTHRITIS OF RIGHT SHOULDER: ICD-10-CM

## 2018-03-16 RX ORDER — CELECOXIB 200 MG/1
200 CAPSULE ORAL
Qty: 60 CAP | Refills: 5 | Status: SHIPPED | OUTPATIENT
Start: 2018-03-16 | End: 2018-09-18 | Stop reason: SDUPTHER

## 2018-03-16 RX ORDER — FLUTICASONE PROPIONATE 50 MCG
2 SPRAY, SUSPENSION (ML) NASAL DAILY
Qty: 1 BOTTLE | Refills: 5 | Status: SHIPPED | OUTPATIENT
Start: 2018-03-16 | End: 2019-06-21 | Stop reason: SDUPTHER

## 2018-03-16 RX ORDER — ATORVASTATIN CALCIUM 40 MG/1
40 TABLET, FILM COATED ORAL DAILY
Qty: 30 TAB | Refills: 5 | Status: SHIPPED | OUTPATIENT
Start: 2018-03-16 | End: 2019-02-04 | Stop reason: SDUPTHER

## 2018-03-16 NOTE — PROGRESS NOTES
Deangelo Bermudez [de-identified] y.o. female   Chief Complaint   Patient presents with    Follow-up    Cholesterol Problem    Medication Refill         1. Have you been to the ER, urgent care clinic since your last visit? Hospitalized since your last visit? No    2. Have you seen or consulted any other health care providers outside of the [address] [place] [address] since your last visit? Include any pap smears or colon screening.  No

## 2018-03-16 NOTE — MR AVS SNAPSHOT
[facility] 
 
 
 [place] [address] 
[phone] Patient: Erin Cavazos MRN: [id number] SLS:[id number] Visit Information Date & Time Provider Department Dept. Phone Encounter #  
 3/16/2018  1:00 PM Shi White MD [address] [id number] Your Appointments 6/18/2018  1:00 PM  
Follow Up with Shi White MD  
[facility] (--) Appt Note: 3 month follow up Noah Patel [facility] [id number] [phone]  
  
   
 [place] [address] Upcoming Health Maintenance Date Due ZOSTER VACCINE AGE 60> 4/30/2018* DTaP/Tdap/Td series (1 - Tdap) 4/30/2018* Pneumococcal 65+ Low/Medium Risk (2 of 2 - PPSV23) 4/4/2018 MEDICARE YEARLY EXAM 4/5/2018 GLAUCOMA SCREENING Q2Y 5/1/2019 *Topic was postponed. The date shown is not the original due date. Allergies as of 3/16/2018  Review Complete On: 3/16/2018 By: Shi White MD  
 No Known Allergies Current Immunizations  Reviewed on 11/21/2014 No immunizations on file. Not reviewed this visit You Were Diagnosed With   
  
 Codes Comments Chronic seasonal allergic rhinitis, unspecified trigger    -  Primary ICD-10-CM: J30.2 ICD-9-CM: 477.8 Hypercholesterolemia     ICD-10-CM: E78.00 ICD-9-CM: 272.0 Primary osteoarthritis of right shoulder     ICD-10-CM: M19.011 
ICD-9-CM: 715.11 Primary osteoarthritis involving multiple joints     ICD-10-CM: M15.0 ICD-9-CM: 715.09 Vitals BP Pulse Temp Resp Height(growth percentile) Weight(growth percentile) 134/68 71 97.1 °F (36.2 °C) (Oral) 18 5' 9\" (1.753 m) 231 lb (104.8 kg) LMP SpO2 BMI OB Status Smoking Status 12/31/1985 98% 34.11 kg/m2 Hysterectomy Former Smoker BMI and BSA Data Body Mass Index Body Surface Area  
 34.11 kg/m 2 2.26 m 2 Preferred Pharmacy Pharmacy Name Phone [facility] Your Updated Medication List  
  
   
This list is accurate as of 3/16/18  2:08 PM.  Always use your most recent med list.  
  
  
  
  
 atorvastatin 40 mg tablet Commonly known as:  LIPITOR Take 1 Tab by mouth daily. celecoxib 200 mg capsule Commonly known as:  CeleBREX Take 1 Cap by mouth two (2) times daily as needed for Pain. fluticasone 50 mcg/actuation nasal spray Commonly known as:  Jeppie [place] 2 Sprays by Both Nostrils route daily. furosemide 20 mg tablet Commonly known as:  LASIX Take 1 Tab by mouth daily. Indications: Edema  
  
 gabapentin 300 mg capsule Commonly known as:  NEURONTIN Take 300 mg by mouth three (3) times daily. miscellaneous medical supply Misc DISPENSE (1) BEDSIDE COMMODE. DX:M15.9,Z74.09,Z96.641,M62.81,Z91.81  
  
 VITAMIN B-12 500 mcg tablet Generic drug:  cyanocobalamin Take 500 mcg by mouth daily. VITAMIN D3 1,000 unit tablet Generic drug:  cholecalciferol Prescriptions Sent to Pharmacy Refills  
 atorvastatin (LIPITOR) 40 mg tablet 5 Sig: Take 1 Tab by mouth daily. Class: Normal  
 [facility] Ph #: [phone] Route: Oral  
 fluticasone (FLONASE) 50 mcg/actuation nasal spray 5 Si Sprays by Both Nostrils route daily. Class: Normal  
 Pharm[facility]: [facility] Ph #: [phone] Route: Both Nostrils  
 celecoxib (CELEBREX) 200 mg capsule 5 Sig: Take 1 Cap by mouth two (2) times daily as needed for Pain. Class: Normal  
 Pharmacy: [facility] Ph #: [phone] Route: Oral  
  
To-Do List   
 2018 Lab:  LIPID PANEL   
  
 2018 Lab:  METABOLIC PANEL, COMPREHENSIVE Patient Instructions Please contact our office if you have any questions about your visit today. Introducing [facility]! [facility] introduces Nebel.[employer] patient portal. Now you can access parts of your medical record, email your doctor's office, and request medication refills online. 1. In your internet browser, go to [email or web address] 2. Click on the First Time User? Click Here link in the Sign In box. You will see the New Member Sign Up page. 3. Enter your Nebel[employer] Access Code exactly as it appears below. You will not need to use this code after youve completed the sign-up process. If you do not sign up before the expiration date, you must request a new code. · [email or web address][employer] Access Code: [email or web address] Expires: 4/30/2018  4:59 PM 
 
4. Enter the last four digits of your Social Security Number (xxxx) and Date of Birth (mm/dd/yyyy) as indicated and click Submit. You will be taken to the next sign-up page. 5. Create a Nebel[employer]TV ID. This will be your Neb[employer] login ID and cannot be changed, so think of one that is secure and easy to remember. 6. Create a Nebel[employer]TV password. You can change your password at any time. 7. Enter your Password Reset Question and Answer. This can be used at a later time if you forget your password. 8. Enter your e-mail address. You will receive e-mail notification when new information is available in [address] 9. Click Sign Up. You can now view and download portions of your medical record. 10. Click the Download Summary menu link to download a portable copy of your medical information. If you have questions, please visit the Frequently Asked Questions section of the Nebel[employer] website. Remember, Nebel[employer]TV is NOT to be used for urgent needs. For medical emergencies, dial 911. Now available from your iPhone and Android! Please provide this summary of care documentation to your next provider. Your primary care clinician is listed as Tashi Arreola. If you have any questions after today's visit, please call [phone].

## 2018-03-16 NOTE — PROGRESS NOTES
HISTORY OF PRESENT ILLNESS  Nasima Junior is a [de-identified] y.o. female. Chief Complaint   Patient presents with    Follow-up    Cholesterol Problem     Patient is fasting today.  Medication Refill       HPI  Patient is here for a  follow up of Lipids, and medication refills. Patient had labs on 11-30-17. Labs reviewed in detail with patient     Patient does need medication refills today. Patient requests electronic refills. New concerns today: Patient states that she recently had surgery on her left foot at 1 foot 2 foot (). She had the same procedure on the r foot just after [place] and it has gone well. The left ankle cellulitis that she had at her last visit has cleared. Review of Systems   Constitutional: Negative. HENT: Negative. Respiratory: Negative. Cardiovascular: Negative. Musculoskeletal: Positive for joint pain. All other systems reviewed and are negative. Physical Exam  Physical Exam   Nursing note and vitals reviewed. Constitutional: She is oriented to person, place, and time. She appears well-developed and well-nourished. HENT:   Head: Normocephalic and atraumatic. Right Ear: External ear normal.   Left Ear: External ear normal.   Nose: Nose normal.   Eyes: Conjunctivae and EOM are normal.   Neck: Normal range of motion. Neck supple. No JVD present. Carotid bruit is not present. No thyromegaly present. Cardiovascular: Normal rate, regular rhythm, normal heart sounds and intact distal pulses. Exam reveals no gallop and no friction rub. No murmur heard. Pulmonary/Chest: Effort normal and breath sounds normal. She has no wheezes. She has no rhonchi. She has no rales. Abdominal: Soft. Bowel sounds are normal.   Musculoskeletal: Normal range of motion. Neurological: She is alert and oriented to person, place, and time. Coordination normal.   Skin: Skin is warm and dry. Psychiatric: She has a normal mood and affect.  Her behavior is normal. Judgment and thought content normal.     ASSESSMENT and PLAN  Diagnoses and all orders for this visit:    1. Chronic seasonal allergic rhinitis, unspecified trigger  -     fluticasone (FLONASE) 50 mcg/actuation nasal spray; 2 Sprays by Both Nostrils route daily. 2. Hypercholesterolemia  -     atorvastatin (LIPITOR) 40 mg tablet; Take 1 Tab by mouth daily.  -     METABOLIC PANEL, COMPREHENSIVE; Future  -     LIPID PANEL; Future    3. Primary osteoarthritis of right shoulder  -     celecoxib (CELEBREX) 200 mg capsule; Take 1 Cap by mouth two (2) times daily as needed for Pain. Trial: topical pain compound. 4. Primary osteoarthritis involving multiple joints  -     celecoxib (CELEBREX) 200 mg capsule; Take 1 Cap by mouth two (2) times daily as needed for Pain. Trial: topical pain compound.      Follow-up Disposition: 3 months; sooner prn

## 2018-06-01 ENCOUNTER — HOSPITAL ENCOUNTER (OUTPATIENT)
Dept: LAB | Age: 83
Discharge: HOME | End: 2018-06-01

## 2018-06-01 PROCEDURE — 99001 SPECIMEN HANDLING PT-LAB: CPT | Performed by: FAMILY MEDICINE

## 2018-06-02 LAB
ALBUMIN SERPL-MCNC: 4 G/DL (ref 3.5–4.7)
ALBUMIN/GLOB SERPL: 1.7 {RATIO} (ref 1.2–2.2)
ALP SERPL-CCNC: 119 IU/L (ref 39–117)
ALT SERPL-CCNC: 8 IU/L (ref 0–32)
AST SERPL-CCNC: 13 IU/L (ref 0–40)
BILIRUB SERPL-MCNC: 0.5 MG/DL (ref 0–1.2)
BUN SERPL-MCNC: 11 MG/DL (ref 8–27)
BUN/CREAT SERPL: 14 (ref 12–28)
CALCIUM SERPL-MCNC: 9.2 MG/DL (ref 8.7–10.3)
CHLORIDE SERPL-SCNC: 106 MMOL/L (ref 96–106)
CO2 SERPL-SCNC: 25 MMOL/L (ref 18–29)
CREAT SERPL-MCNC: 0.79 MG/DL (ref 0.57–1)
GFR SERPLBLD CREATININE-BSD FMLA CKD-EPI: 71 ML/MIN/1.73
GFR SERPLBLD CREATININE-BSD FMLA CKD-EPI: 82 ML/MIN/1.73
GLOBULIN SER CALC-MCNC: 2.3 G/DL (ref 1.5–4.5)
GLUCOSE SERPL-MCNC: 82 MG/DL (ref 65–99)
POTASSIUM SERPL-SCNC: 4.4 MMOL/L (ref 3.5–5.2)
PROT SERPL-MCNC: 6.3 G/DL (ref 6–8.5)
SODIUM SERPL-SCNC: 143 MMOL/L (ref 134–144)

## 2018-06-18 ENCOUNTER — OFFICE VISIT (OUTPATIENT)
Dept: FAMILY MEDICINE CLINIC | Age: 83
End: 2018-06-18

## 2018-06-18 VITALS
TEMPERATURE: 97 F | RESPIRATION RATE: 14 BRPM | DIASTOLIC BLOOD PRESSURE: 73 MMHG | SYSTOLIC BLOOD PRESSURE: 120 MMHG | BODY MASS INDEX: 34.8 KG/M2 | HEIGHT: 69 IN | OXYGEN SATURATION: 99 % | HEART RATE: 62 BPM | WEIGHT: 235 LBS

## 2018-06-18 DIAGNOSIS — G89.29 CHRONIC RIGHT SHOULDER PAIN: ICD-10-CM

## 2018-06-18 DIAGNOSIS — E78.00 HYPERCHOLESTEROLEMIA: Primary | ICD-10-CM

## 2018-06-18 DIAGNOSIS — M25.511 CHRONIC RIGHT SHOULDER PAIN: ICD-10-CM

## 2018-06-18 NOTE — PROGRESS NOTES
Africa Valente presents today for   Chief Complaint   Patient presents with    Follow-up    Labs    Cholesterol Problem    Osteoarthritis     of multiple joints               Is the patient using any DME equipment during [address]? Rollator walker      Coordination of Care:  1. Have you been to the ER, urgent care clinic since your last visit? Hospitalized since your last visit? no    2. Have you seen or consulted any other health care providers outside of the [address] [place] [address] since your last visit? Include any pap smears or colon screening.  no

## 2018-06-18 NOTE — PROGRESS NOTES
HISTORY OF PRESENT ILLNESS  Noy Capellan is a [de-identified] y.o. female. Chief Complaint   Patient presents with    Follow-up    Labs    Cholesterol Problem    Osteoarthritis     of multiple joints       HPI  Patient is here for a  follow up of OA of multiple joints, and lipids. Patient had labs on 6-1-18. Labs reviewed in detail with patient     Patient does not need medication refills today. New concerns today: none      Review of Systems   Constitutional: Negative. HENT: Negative. Respiratory: Negative. Cardiovascular: Negative. Musculoskeletal: Positive for joint pain. All other systems reviewed and are negative. Physical Exam  Physical Exam   Nursing note and vitals reviewed. Constitutional: She is oriented to person, place, and time. She appears well-developed and well-nourished. HENT:   Head: Normocephalic and atraumatic. Right Ear: External ear normal.   Left Ear: External ear normal.   Nose: Nose normal.   Eyes: Conjunctivae and EOM are normal.   Neck: Normal range of motion. Neck supple. No JVD present. Carotid bruit is not present. No thyromegaly present. Cardiovascular: Normal rate, regular rhythm, normal heart sounds and intact distal pulses. Exam reveals no gallop and no friction rub. No murmur heard. Pulmonary/Chest: Effort normal and breath sounds normal. She has no wheezes. She has no rhonchi. She has no rales. Abdominal: Soft. Bowel sounds are normal.   Musculoskeletal: Normal range of motion. Neurological: She is alert and oriented to person, place, and time. Coordination normal.   Skin: Skin is warm and dry. Psychiatric: She has a normal mood and affect. Her behavior is normal. Judgment and thought content normal.     ASSESSMENT and PLAN  Diagnoses and all orders for this visit:    1. Hypercholesterolemia  Stable, cont pres tx plan. 2. Chronic right shoulder pain  Pt is clear that she is not interested in surgery.   Pt advised to consider seeing ortho for eval for possible injection. Cont current meds.      Follow-up Disposition: 3 months; sooner prn

## 2018-07-18 DIAGNOSIS — R60.0 EDEMA OF BOTH LEGS: ICD-10-CM

## 2018-07-20 RX ORDER — FUROSEMIDE 20 MG/1
TABLET ORAL
Qty: 30 TAB | Refills: 0 | Status: SHIPPED | OUTPATIENT
Start: 2018-07-20 | End: 2019-05-07 | Stop reason: SDUPTHER

## 2018-07-30 ENCOUNTER — HOSPITAL ENCOUNTER (EMERGENCY)
Age: 83
Discharge: HOME | End: 2018-07-30
Attending: EMERGENCY MEDICINE | Admitting: EMERGENCY MEDICINE
Payer: MEDICAID

## 2018-07-30 ENCOUNTER — APPOINTMENT (OUTPATIENT)
Dept: GENERAL RADIOLOGY | Age: 83
End: 2018-07-30
Attending: EMERGENCY MEDICINE
Payer: MEDICAID

## 2018-07-30 VITALS
OXYGEN SATURATION: 98 % | WEIGHT: 233 LBS | HEART RATE: 66 BPM | TEMPERATURE: 98 F | RESPIRATION RATE: 18 BRPM | HEIGHT: 69 IN | BODY MASS INDEX: 34.51 KG/M2 | DIASTOLIC BLOOD PRESSURE: 72 MMHG | SYSTOLIC BLOOD PRESSURE: 121 MMHG

## 2018-07-30 DIAGNOSIS — S80.01XA CONTUSION OF RIGHT KNEE, INITIAL ENCOUNTER: Primary | ICD-10-CM

## 2018-07-30 DIAGNOSIS — M25.561 ACUTE PAIN OF RIGHT KNEE: ICD-10-CM

## 2018-07-30 PROCEDURE — 99283 EMERGENCY DEPT VISIT LOW MDM: CPT

## 2018-07-30 PROCEDURE — 73564 X-RAY EXAM KNEE 4 OR MORE: CPT

## 2018-07-30 PROCEDURE — 74011250637 HC RX REV CODE- 250/637: Performed by: EMERGENCY MEDICINE

## 2018-07-30 RX ORDER — TRAMADOL HYDROCHLORIDE 50 MG/1
50 TABLET ORAL
Status: COMPLETED | OUTPATIENT
Start: 2018-07-30 | End: 2018-07-30

## 2018-07-30 RX ORDER — TRAMADOL HYDROCHLORIDE 50 MG/1
50 TABLET ORAL
Qty: 8 TAB | Refills: 0 | Status: SHIPPED | OUTPATIENT
Start: 2018-07-30 | End: 2018-11-30

## 2018-07-30 RX ADMIN — TRAMADOL HYDROCHLORIDE 50 MG: 50 TABLET, FILM COATED ORAL at 08:41

## 2018-07-30 NOTE — ED PROVIDER NOTES
EMERGENCY DEPARTMENT HISTORY AND PHYSICAL EXAM    8:23 AM      Date: 7/30/2018  Patient Name: Palmer Brambila    History of Presenting Illness     Chief Complaint   Patient presents with   [employer] Fall    Knee Injury         History Provided By: Patient    Chief Complaint: Knee pain   Duration: \"yesterday evening\" Hours  Timing:  Acute  Location: right knee (pt points to anterior lateral right knee)  Quality: Aching and Stabbing  Severity: 10 out of 10  Modifying Factors: Celebrex, no relief of Sx  Associated Symptoms: None       Additional History (Context): Palmer Brambila is a [de-identified] y.o. female with PMHx of varicose veins, kidney stones, vaginal fibroids, peripheral vascular disease, dyslipidemia, osteoarthritis, anemia, osteoporosis, and neuropathy who presents with c/o 10 out of 10 aching and stabbing acute onset right knee pain (pt points to anterior lateral area) that started \"yesterday evening\". Pt states she tripped on her throw rug while walking away from the kitchen and fell. She states she thought she was fine at first and then the pain woke her up out of her sleep last night. She states the pain feels like \"pins sticking it\". Pt states she took Celebrex for her Sx with no relief. Pt states she has been ambulating since fall. Pt has a hx of left and right knee replacement surgeries and hip surgery. Her orthopedic doctor is Dr. Ericka Vásquez. Pt states she can ambulate. Pt denies head injury, neck pain, numbness, LOC, cp, sob, hip pain or dizziness. Denies any further complaints or symptoms at the moment. PCP: Tere Rai MD      Past History     Past Medical History:  Past Medical History:   Diagnosis Date    Decreased calculated GFR 12/5/2014    Dyslipidemia     High vitamin D level 12/6/2014    Vitamin D 25-Hydroxy (12/06/2014) = 138.9     History of echocardiogram 08/29/2014    EF 60%. No WMA. Mild conc LVH. Gr 1 DDfx. Mild RVE. Mild CATRACHO. Sm right & sm left pleural effusion.       History of kidney stones     Hypercholesterolemia     Lower extremity venous duplex 10/02/2014    No DVT bilaterally.  Numbness in both hands     Osteoarthritis     Osteoarthritis of right hip     Osteoporosis     Peripheral neuropathy     Peripheral vascular disease (HCC)     Postoperative anemia due to acute blood loss     Vaginal fibroids     resolved s/p hyst    Varicose veins        Past Surgical History:  Past Surgical History:   Procedure Laterality Date    HX CHOLECYSTECTOMY      HX HERNIA REPAIR  2010    abdomina,had 2nd surgery for infection    HX HIP REPLACEMENT Right 12/02/2014    S/P Right total hip replacement (12/02/2014 - Dr. Michel Domínguez)    HX [place]    HX KNEE REPLACEMENT      HX OTHER SURGICAL  03/07/2018    Left foot toe surgery-DANA VARNER @ 1 foot 2 foot    TOTAL KNEE ARTHROPLASTY  2006    left    TOTAL KNEE ARTHROPLASTY  2001    right    VASCULAR SURGERY PROCEDURE UNLIST      Bilateral leg vein stripping       Family History:  Family History   Problem Relation Age of Onset   [de-identified] Arthritis-rheumatoid Mother     Heart Attack Mother     Arthritis-rheumatoid Father     Other Father      gangrene    Arthritis-osteo Sister     Other Brother      pna    Arthritis-osteo Brother     Cancer Daughter      in remission    Arthritis-osteo Brother     Diabetes Brother     Arthritis-osteo Sister        Social History:  Social History   Substance Use Topics    Smoking status: Former Smoker     Years: 1.00     Types: Cigarettes     Quit date: 1/1/1982    Smokeless tobacco: Never Used    Alcohol use No      Comment: Quit alcohol in 1982       Allergies:  No Known Allergies      Review of Systems       Review of Systems   Constitutional: Negative for fever. HENT: Negative for sore throat. Eyes: Negative for redness. Respiratory: Negative for shortness of breath and wheezing. Gastrointestinal: Negative for abdominal pain and nausea. Genitourinary: Negative for dysuria. Musculoskeletal: Negative for neck stiffness. Positive for knee pain (right)  Negative for hip pain      Skin: Negative for pallor. Neurological: Negative for dizziness and headaches. Hematological: Does not bruise/bleed easily. All other systems reviewed and are negative. Physical Exam     Visit Vitals    /72 (BP 1 Location: Left arm, BP Patient Position: At rest)    Pulse 66    Temp 98 °F (36.7 °C)    Resp 18    Ht 5' 9\" (1.753 m)    Wt 105.7 kg (233 lb)    LMP 12/31/1985    SpO2 98%    BMI 34.41 kg/m2         Physical Exam   Constitutional: She is oriented to person, place, and time. She appears well-developed and well-nourished. No distress. HENT:   Head: Normocephalic and atraumatic. Mouth/Throat: Oropharynx is clear and moist.   Eyes: Conjunctivae and EOM are normal. No scleral icterus. Neck: Normal range of motion. Neck supple. Cardiovascular: Normal rate and intact distal pulses. Capillary refill < 3 seconds  Good dorsal pedal pulse    Pulmonary/Chest: Effort normal and breath sounds normal. No respiratory distress. Musculoskeletal: Normal range of motion. She exhibits no edema. Anterior lateral TTP of right knee   Good right knee flexion and extension   Couldn't determine if effusion due to body habitus obese  No laxity  No swelling  No calf tenderness  Varicose veins present   Post surgical scar on both knees   Negative for hip TTP  Pelvis stable    Neurological: She is alert and oriented to person, place, and time. No cranial nerve deficit or sensory deficit. She exhibits normal muscle tone. Coordination normal.   No slurred speech   No facial droop  Strength 5/5  Sensation intact   Skin: Skin is warm and dry. She is not diaphoretic. Psychiatric: Her behavior is normal.   Nursing note and vitals reviewed. Diagnostic Study Results     Labs -  No results found for this or any previous visit (from the past 12 hour(s)).     Radiologic Studies -   XR KNEE RT MIN 4 V   Final Result      Xr Knee Rt Min 4 V  Result Date: 7/30/2018  IMPRESSION: Stable postop changes with good alignment. No acute bony injury or hardware fracture seen. Moderate edema around the knee. Thank you for your referral.     Preliminary read per ED Physician showed:   Hardware in place, no fracture or dislocation. Arthritic changes. Medical Decision Making   I am the first provider for this patient. I reviewed the vital signs, available nursing notes, past medical history, past surgical history, family history and social history. Vital Signs-Reviewed the patient's vital signs. Pulse Oximetry Analysis -  98% on room air (Interpretation) normal     Records Reviewed: Nursing Notes (Time of Review: 8:23 AM)    Provider Notes (Medical Decision Making):DDx: contusion, strian, fracture, hardware derangement. Get xray and give pain medication. MDM    Medications   traMADol (ULTRAM) tablet 50 mg (50 mg Oral Given 7/30/18 0841)       ED Course: Progress Notes, Reevaluation, and Consults:  I have reassessed the patient. I have discussed the workup, results and plan with the patient and patient is in agreement. Patient is feeling better. Patient will be prescribed tramadol. Patient was discharge in stable condition. Patient was given outpatient follow up. Patient is to return to emergency department if any new or worsening condition. Diagnosis     Clinical Impression:   1. Contusion of right knee, initial encounter    2.  Acute pain of right knee        Disposition: discharged     Follow-up Information     Follow up With Details Comments [address] Osmany Hamlin MD Schedule an appointment as soon as possible for a visit in 1 day  [address] [facility]      [address] [facility] [place] EMERGENCY DEPT  As needed, If symptoms worsen [facility]  [phone]           Discharge Medication List as of 7/30/2018  9:54 AM START taking these medications    Details   traMADol (ULTRAM) 50 mg tablet Take 1 Tab by mouth every six (6) hours as needed for Pain. Max Daily Amount: 200 mg., Print, Disp-8 Tab, R-0         CONTINUE these medications which have NOT CHANGED    Details   atorvastatin (LIPITOR) 40 mg tablet Take 1 Tab by mouth daily. , Normal, Disp-30 Tab, R-5      fluticasone (FLONASE) 50 mcg/actuation nasal spray 2 Sprays by Both Nostrils route daily. , Normal, Disp-1 Bottle, R-5      celecoxib (CELEBREX) 200 mg capsule Take 1 Cap by mouth two (2) times daily as needed for Pain., Normal, Disp-60 Cap, R-5      miscellaneous medical supply misc DISPENSE (1) BEDSIDE COMMODE. DX:M15.9,Z74.09,Z96.641,M62.81,Z91.81, Print, Disp-1 Each, R-0      VITAMIN D3 1,000 unit tablet Historical Med, ENZO      gabapentin (NEURONTIN) 300 mg capsule Take 300 mg by mouth three (3) times daily. , Historical Med      cyanocobalamin (VITAMIN B-12) 500 mcg tablet Take 500 mcg by mouth daily. , Historical Med      furosemide (LASIX) 20 mg tablet TAKE ONE TABLET BY MOUTH DAILY FOR EDEMA, Normal, Disp-30 Tab, R-0           _______________________________    Attestations:  Scribe Attestation     Mitra Benjie Etienne acting as a scribe for and in the presence of Robbie Centeno DO      July 30, 2018 at 8:25 AM       Provider Attestation:      I personally performed the services described in the documentation, reviewed the documentation, as recorded by the scribe in my presence, and it accurately and completely records my words and actions.  July 30, 2018 at 8:25 AM - Robbie Centeno DO    _______________________________

## 2018-07-30 NOTE — ED TRIAGE NOTES
Pt c/o right knee pain after she fell this morning when she tripped over a throw rug . The EMS helped her off the floor.

## 2018-07-30 NOTE — DISCHARGE INSTRUCTIONS

## 2018-09-11 ENCOUNTER — HOSPITAL ENCOUNTER (OUTPATIENT)
Dept: LAB | Age: 83
Discharge: HOME | End: 2018-09-11

## 2018-09-11 LAB — XX-LABCORP SPECIMEN COL,LCBCF: NORMAL

## 2018-09-11 PROCEDURE — 99001 SPECIMEN HANDLING PT-LAB: CPT | Performed by: FAMILY MEDICINE

## 2018-09-12 LAB
ALBUMIN SERPL-MCNC: 4.1 G/DL (ref 3.5–5.5)
ALBUMIN/GLOB SERPL: 1.6 {RATIO} (ref 1.2–2.2)
ALP SERPL-CCNC: 138 IU/L (ref 39–117)
ALT SERPL-CCNC: 24 IU/L (ref 0–32)
AST SERPL-CCNC: 30 IU/L (ref 0–40)
BILIRUB SERPL-MCNC: 0.4 MG/DL (ref 0–1.2)
BUN SERPL-MCNC: 14 MG/DL (ref 6–24)
BUN/CREAT SERPL: 17 (ref 9–23)
CALCIUM SERPL-MCNC: 9.7 MG/DL (ref 8.7–10.2)
CHLORIDE SERPL-SCNC: 105 MMOL/L (ref 96–106)
CHOLEST SERPL-MCNC: 219 MG/DL (ref 100–199)
CO2 SERPL-SCNC: 19 MMOL/L (ref 20–29)
CREAT SERPL-MCNC: 0.82 MG/DL (ref 0.57–1)
GLOBULIN SER CALC-MCNC: 2.6 G/DL (ref 1.5–4.5)
GLUCOSE SERPL-MCNC: 84 MG/DL (ref 65–99)
HDLC SERPL-MCNC: 59 MG/DL
INTERPRETATION, 910389: NORMAL
LDLC SERPL CALC-MCNC: 140 MG/DL (ref 0–99)
POTASSIUM SERPL-SCNC: 4.5 MMOL/L (ref 3.5–5.2)
PROT SERPL-MCNC: 6.7 G/DL (ref 6–8.5)
SODIUM SERPL-SCNC: 143 MMOL/L (ref 134–144)
TRIGL SERPL-MCNC: 99 MG/DL (ref 0–149)
VLDLC SERPL CALC-MCNC: 20 MG/DL (ref 5–40)

## 2018-09-13 NOTE — PROGRESS NOTES
I have not seen this patient in nearly 3 years so even though I think her cholesterol is too high and personally would try to increase her Lipitor or more likely add Zetia watching her LFTs closely since her mildly elevated, I do not think that I can make any recommendations at this time. If she was supposed to follow-up and did not and I guess we could call her to see if she is having any issues and wants to be reevaluated otherwise I would not do anything at this time.  ES

## 2018-09-18 ENCOUNTER — OFFICE VISIT (OUTPATIENT)
Dept: FAMILY MEDICINE CLINIC | Age: 83
End: 2018-09-18

## 2018-09-18 VITALS
DIASTOLIC BLOOD PRESSURE: 75 MMHG | HEART RATE: 70 BPM | SYSTOLIC BLOOD PRESSURE: 135 MMHG | WEIGHT: 237 LBS | HEIGHT: 69 IN | RESPIRATION RATE: 18 BRPM | BODY MASS INDEX: 35.1 KG/M2 | TEMPERATURE: 97.1 F

## 2018-09-18 DIAGNOSIS — E78.00 HYPERCHOLESTEROLEMIA: Primary | ICD-10-CM

## 2018-09-18 DIAGNOSIS — M15.9 PRIMARY OSTEOARTHRITIS INVOLVING MULTIPLE JOINTS: Chronic | ICD-10-CM
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
## 2018-09-18 DIAGNOSIS — E78.00 HYPERCHOLESTEROLEMIA: ICD-10-CM

## 2018-09-18 DIAGNOSIS — M25.561 ACUTE PAIN OF RIGHT KNEE: ICD-10-CM

## 2018-09-18 DIAGNOSIS — M19.011 PRIMARY OSTEOARTHRITIS OF RIGHT SHOULDER: ICD-10-CM

## 2018-09-18 PROBLEM — E66.01 SEVERE OBESITY (BMI 35.0-39.9): Status: ACTIVE | Noted: 2018-09-18

## 2018-09-18 RX ORDER — CELECOXIB 200 MG/1
200 CAPSULE ORAL
Qty: 60 CAP | Refills: 11 | Status: SHIPPED | OUTPATIENT
Start: 2018-09-18 | End: 2019-03-11 | Stop reason: SDUPTHER

## 2018-09-18 NOTE — PATIENT INSTRUCTIONS

## 2018-09-18 NOTE — PROGRESS NOTES
Daniela Byrd presents today for   Chief Complaint   Patient presents with    Follow-up    Cholesterol Problem    Knee Injury     Right knee Confusion    Labs     completed 9/11/18       Is someone accompanying this pt? Yes, Son    Is the patient using any DME equipment during OV? no    Depression Screening:  PHQ over the last two weeks 1/30/2018   Little interest or pleasure in doing things Not at all   Feeling down, depressed, irritable, or hopeless Not at all   Total Score PHQ 2 0       Learning Assessment:  Learning Assessment 7/1/2016   PRIMARY LEARNER Patient   HIGHEST LEVEL OF EDUCATION - PRIMARY LEARNER  -   BARRIERS PRIMARY LEARNER -   CO-LEARNER CAREGIVER -   PRIMARY LANGUAGE ENGLISH   LEARNER PREFERENCE PRIMARY DEMONSTRATION   ANSWERED BY Patient   RELATIONSHIP SELF       Abuse Screening:  Abuse Screening Questionnaire 9/18/2018   Do you ever feel afraid of your partner? N   Are you in a relationship with someone who physically or mentally threatens you? N   Is it safe for you to go home? Y           Health Maintenance Due   Topic Date Due    DTaP/Tdap/Td series (1 - Tdap) 08/30/1958    ZOSTER VACCINE AGE 60>  06/30/1997    Pneumococcal 65+ Low/Medium Risk (2 of 2 - PPSV23) 04/04/2018    Influenza Age 9 to Adult  08/01/2018   . Health Maintenance reviewed and discussed and ordered per Provider. Daniela Byrd is updated on all       Coordination of Care:  1. Have you been to the ER, urgent care clinic since your last visit? Hospitalized since your last visit? Yes, [facility]    2. Have you seen or consulted any other health care providers outside of the [facility] since your last visit? Include any pap smears or colon screening. no    Please see Red banners under Allergies, Med rec, Immunizations to remove outside inquires. All correct information has been verified with patient and added to chart.

## 2018-11-30 ENCOUNTER — OFFICE VISIT (OUTPATIENT)
Dept: FAMILY MEDICINE CLINIC | Age: 83
End: 2018-11-30

## 2018-11-30 VITALS
BODY MASS INDEX: 39.13 KG/M2 | TEMPERATURE: 98.2 F | HEIGHT: 64 IN | DIASTOLIC BLOOD PRESSURE: 72 MMHG | SYSTOLIC BLOOD PRESSURE: 119 MMHG | HEART RATE: 76 BPM | RESPIRATION RATE: 16 BRPM | WEIGHT: 229.2 LBS

## 2018-11-30 DIAGNOSIS — H25.9 AGE-RELATED CATARACT OF BOTH EYES, UNSPECIFIED AGE-RELATED CATARACT TYPE: Primary | ICD-10-CM

## 2018-11-30 DIAGNOSIS — Z01.818 PREOP EXAMINATION: ICD-10-CM

## 2018-11-30 DIAGNOSIS — M19.012 OSTEOARTHRITIS OF BOTH SHOULDERS, UNSPECIFIED OSTEOARTHRITIS TYPE: ICD-10-CM

## 2018-11-30 DIAGNOSIS — E78.00 HYPERCHOLESTEROLEMIA: ICD-10-CM

## 2018-11-30 DIAGNOSIS — M19.011 OSTEOARTHRITIS OF BOTH SHOULDERS, UNSPECIFIED OSTEOARTHRITIS TYPE: ICD-10-CM

## 2018-11-30 NOTE — PROGRESS NOTES
Preoperative Evaluation    Date of Exam: 2018    Alfredo Good is a 82 y.o. female (:1935) who presents for preoperative evaluation. Procedure/Surgery:b cataract extraction  Date of Procedure/Surgery: 2018  Surgeon: Dr Tere Ruiz: Talha Mcgrath [facility]  Primary Physician: Andres Garnett MD  Latex Allergy: no    Problem List:     Patient Active Problem List    Diagnosis Date Noted    Severe obesity (BMI 35.0-39.9) 2018    ACP (advance care planning) 2017    Muscle weakness (generalized) 2016    Difficulty in walking, not elsewhere classified 2016    Fx intertrochanteric hip ([address] 2016    Hypercholesterolemia 2016    [facility] bowel obstruction ([facility] 2015    Peripheral neuropathy     Peripheral vascular disease ([facility] [address] [facility]     Postoperative anemia due to acute blood loss     Osteoarthritis of right hip     History of kidney stones     Numbness in both hands     High vitamin D level 2014    Impaired mobility and ADLs 2014    Status post right hip replacement 2014    Decreased calculated GFR 2014    Osteoporosis     Osteoarthritis     Dyslipidemia     Varicose veins      Medical History:     Past Medical History:   Diagnosis Date    Decreased calculated GFR 2014    High vitamin D level 2014    Vitamin D 25-Hydroxy (2014) = 138.9     History of echocardiogram 2014    EF 60%. No WMA. Mild conc LVH. Gr 1 DDfx. Mild RVE. Mild CATRACHO. Sm right & sm left pleural effusion.  Hypercholesterolemia     Lower extremity venous duplex 10/02/2014    No DVT bilaterally.     Numbness in both hands     Osteoarthritis     Osteoarthritis of right hip     Osteoporosis     Peripheral neuropathy     Peripheral vascular disease (HCC)     Postoperative anemia due to acute blood loss     Vaginal fibroids     resolved s/p hyst    Varicose veins      Allergies:   No Known Allergies   Medications:     Current Outpatient Medications   Medication Sig    celecoxib (CELEBREX) 200 mg capsule Take 1 Cap by mouth two (2) times daily as needed for Pain.  furosemide (LASIX) 20 mg tablet TAKE ONE TABLET BY MOUTH DAILY FOR EDEMA    atorvastatin (LIPITOR) 40 mg tablet Take 1 Tab by mouth daily.  fluticasone (FLONASE) 50 mcg/actuation nasal spray 2 Sprays by Both Nostrils route daily.  miscellaneous medical supply misc DISPENSE (1) BEDSIDE COMMODE. DX:M15.9,Z74.09,Z96.641,M62.81,Z91.81    VITAMIN D3 1,000 unit tablet     gabapentin (NEURONTIN) 300 mg capsule Take 300 mg by mouth three (3) times daily.  cyanocobalamin (VITAMIN B-12) 500 mcg tablet Take 500 mcg by mouth daily. No current facility-administered medications for this visit.       Surgical History:     Past Surgical History:   Procedure Laterality Date    HX CHOLECYSTECTOMY      HX HERNIA REPAIR      abdominal, had 2nd surgery for infection    HX HIP FRACTURE TX Left     HX HIP REPLACEMENT Right 2014    S/P Right total hip replacement (2014 - Dr. Kranthi Alcantara)   [facility] HX OTHER SURGICAL  2018    Left foot toe surgery-DANA VARNER @ 1 foot 2 foot    TOTAL KNEE ARTHROPLASTY      left    TOTAL KNEE ARTHROPLASTY      right    VASCULAR SURGERY PROCEDURE UNLIST      Bilateral leg vein stripping     Social History:     Social History     Socioeconomic History    Marital status:      Spouse name: Not on file    Number of children: Not on file    Years of education: Not on file    Highest education level: Not on file   Tobacco Use    Smoking status: Former Smoker     Years: 1.00     Types: Cigarettes     Last attempt to quit: 1982     Years since quittin.9    Smokeless tobacco: Never Used   Substance and Sexual Activity    Alcohol use: No     Comment: Quit alcohol in     Drug use: Yes     Types: Prescription, OTC     Comment: prescribed    Sexual activity: No       Recent use of: NSAIDs    Tetanus up to date: tetanus status unknown to the patient      Anesthesia Complications: None  History of abnormal bleeding : None  History of Blood Transfusions: no  Health Care Directive or Living Will: no    REVIEW OF SYSTEMS:  A comprehensive review of systems was negative except for: Eyes: positive for cataracts  Integument/breast: positive for skin lesion(s)  Musculoskeletal: positive for arthralgias    EXAM:   Visit Vitals  /72 (BP 1 Location: Right arm, BP Patient Position: Sitting)   Pulse 76   Temp 98.2 °F (36.8 °C) (Oral)   Resp 16   Ht 5' 3.5\" (1.613 m)   Wt 229 lb 3.2 oz (104 kg)   LMP 12/31/1985   BMI 39.96 kg/m²     General:  Alert, cooperative, no distress, appears stated age. Head:  Normocephalic, without obvious abnormality, atraumatic. Eyes:  Conjunctivae/corneas clear. PERRL, EOMs intact. Fundi benign. Ears:  Normal TMs and external ear canals both ears. Nose: Nares normal. Septum midline. Mucosa normal. No drainage or sinus tenderness. Throat: Lips, mucosa, and tongue normal. Teeth and gums normal.   Neck: Supple, symmetrical, trachea midline, no adenopathy, thyroid: no enlargement/tenderness/nodules, no carotid bruit and no JVD. Back:   Symmetric, no curvature. ROM normal. No CVA tenderness. Lungs:   Clear to auscultation bilaterally. Chest wall:  No tenderness or deformity. Heart:  Regular rate and rhythm, S1, S2 normal, no murmur, click, rub or gallop. Abdomen:   Soft, non-tender. Bowel sounds normal. No masses,  No organomegaly. Extremities: Extremities normal, atraumatic, no cyanosis or edema. Pulses: 2+ and symmetric all extremities. Skin: Skin color, texture, turgor normal. No rashes or lesions. Lymph nodes: Cervical and supraclavicular nodes normal.   Neurologic: CNII-XII intact. Normal strength, sensation and reflexes throughout.            IMPRESSION:   Coronary disease  No contraindications to planned surgery      ICD-10-CM ICD-9-CM    1. Age-related cataract of both eyes, unspecified age-related cataract type H25.9 366.10 Care as per ophtho   2. Preop examination Z01.818 V72.84    3. Hypercholesterolemia E78.00 272.0 Stable, cont pres tx plan. 4. Osteoarthritis of both shoulders, unspecified osteoarthritis type M19.011 715.91 Stable, cont pres tx plan.        Dominguez Alva MD   11/30/2018

## 2018-12-10 PROBLEM — H25.9 AGE-RELATED CATARACT OF BOTH EYES: Status: ACTIVE | Noted: 2018-12-10

## 2019-02-03 NOTE — PROGRESS NOTES
Chief Complaint   Patient presents with    Cholesterol Problem     follow up   [address] [facility] Judie E is a 82 y.o. female. HPI  Patient is here for a routine follow up of hld, oa. Patient continues to have joint aches that she attributes to her arthritis. She is coping with these    No recent labs. Patient does need medication refills today. New concerns today: none      ROS  Review of Systems   Constitutional: Negative. HENT: Negative. Respiratory: Negative. Cardiovascular: Negative. All other systems reviewed and are negative. Physical Exam  Physical Exam   Nursing note and vitals reviewed. Constitutional: She is oriented to person, place, and time. She appears well-developed and well-nourished. HENT:   Head: Normocephalic and atraumatic. Right Ear: External ear normal.   Left Ear: External ear normal.   Nose: Nose normal.   Eyes: Conjunctivae and EOM are normal.   Neck: Normal range of motion. Neck supple. No JVD present. Carotid bruit is not present. No thyromegaly present. Cardiovascular: Normal rate, regular rhythm, normal heart sounds and intact distal pulses. Exam reveals no gallop and no friction rub. No murmur heard. Pulmonary/Chest: Effort normal and breath sounds normal. She has no wheezes. She has no rhonchi. She has no rales. Abdominal: Soft. Bowel sounds are normal.   Musculoskeletal: Normal range of motion. Neurological: She is alert and oriented to person, place, and time. Coordination normal.   Skin: Skin is warm and dry. Psychiatric: She has a normal mood and affect. Her behavior is normal. Judgment and thought content normal.     ASSESSMENT and PLAN  Diagnoses and all orders for this visit:    1. Hypercholesterolemia  -     atorvastatin (LIPITOR) 40 mg tablet; Take 1 Tab by mouth daily. Labs prior to next appointment  2. Primary osteoarthritis, unspecified site  Stable.   Continue present treatment plan    Follow-up Disposition:  Return in about 3 months (around 5/4/2019) for high cholesterol, arthritis.

## 2019-02-04 ENCOUNTER — OFFICE VISIT (OUTPATIENT)
Dept: FAMILY MEDICINE CLINIC | Age: 84
End: 2019-02-04

## 2019-02-04 VITALS
HEIGHT: 64 IN | RESPIRATION RATE: 18 BRPM | DIASTOLIC BLOOD PRESSURE: 72 MMHG | HEART RATE: 75 BPM | BODY MASS INDEX: 38.17 KG/M2 | SYSTOLIC BLOOD PRESSURE: 135 MMHG | TEMPERATURE: 98.1 F | WEIGHT: 223.6 LBS

## 2019-02-04 DIAGNOSIS — E78.00 HYPERCHOLESTEROLEMIA: Primary | ICD-10-CM

## 2019-02-04 DIAGNOSIS — M19.91 PRIMARY OSTEOARTHRITIS, UNSPECIFIED SITE: ICD-10-CM

## 2019-02-04 RX ORDER — ATORVASTATIN CALCIUM 40 MG/1
40 TABLET, FILM COATED ORAL DAILY
Qty: 30 TAB | Refills: 5 | Status: SHIPPED | OUTPATIENT
Start: 2019-02-04 | End: 2019-10-11 | Stop reason: SDUPTHER

## 2019-02-04 NOTE — PROGRESS NOTES
Edwardskeyona Hutchinson presents today for   Chief Complaint   Patient presents with    Cholesterol Problem     follow up    Osteoarthritis       Is someone accompanying this pt? Yes son    Is the patient using any DME equipment during OV? Yes walker    Depression Screening:  PHQ over the last two weeks 2/4/2019   Little interest or pleasure in doing things Not at all   Feeling down, depressed, irritable, or hopeless Not at all   Total Score PHQ 2 0       Learning Assessment:  Learning Assessment 2/4/2019   PRIMARY LEARNER Patient   HIGHEST LEVEL OF EDUCATION - PRIMARY LEARNER  DID NOT GRADUATE [facility] PRIMARY LEARNER NONE   CO-LEARNER CAREGIVER No   PRIMARY LANGUAGE ENGLISH    NEED -   LEARNER PREFERENCE PRIMARY LISTENING   ANSWERED BY self   RELATIONSHIP SELF       Abuse Screening:  Abuse Screening Questionnaire 2/4/2019   Do you ever feel afraid of your partner? N   Are you in a relationship with someone who physically or mentally threatens you? N   Is it safe for you to go home? Y           Health Maintenance Due   Topic Date Due    DTaP/Tdap/Td series (1 - Tdap) 08/30/1958    Shingrix Vaccine Age 50> (1 of 2) 08/30/1987   . Health Maintenance reviewed and discussed and ordered per Provider. Edwards Evangelina is updated on all     Coordination of Care  1. Have you been to the ER, urgent care clinic since your last visit? Hospitalized since your last visit? No    2. Have you seen or consulted any other health care providers outside of the [address] [place] [address] since your last visit? Include any pap smears or colon screening. No    Per-Dr. Benjamin Belle ok medication not taking to be deleted. Advance Directive:  1. Do you have an advance directive in place? Patient Reply:yes  2. Per patient no changes to their ACP contact no.

## 2019-02-04 NOTE — ACP (ADVANCE CARE PLANNING)
Advance Directive:  1. Do you have an advance directive in place? Patient Reply:yes  2. Per patient no changes to their ACP contact no.

## 2019-03-08 ENCOUNTER — HOSPITAL ENCOUNTER (OUTPATIENT)
Dept: LAB | Age: 84
Discharge: HOME | End: 2019-03-08

## 2019-03-08 LAB — XX-LABCORP SPECIMEN COL,LCBCF: NORMAL

## 2019-03-08 PROCEDURE — 99001 SPECIMEN HANDLING PT-LAB: CPT

## 2019-03-11 ENCOUNTER — TELEPHONE (OUTPATIENT)
Dept: FAMILY MEDICINE CLINIC | Age: 84
End: 2019-03-11

## 2019-03-11 DIAGNOSIS — M19.011 PRIMARY OSTEOARTHRITIS OF RIGHT SHOULDER: ICD-10-CM

## 2019-03-11 DIAGNOSIS — M15.9 PRIMARY OSTEOARTHRITIS INVOLVING MULTIPLE JOINTS: Chronic | ICD-10-CM

## 2019-03-11 RX ORDER — CELECOXIB 200 MG/1
200 CAPSULE ORAL
Qty: 60 CAP | Refills: 11 | Status: SHIPPED | OUTPATIENT
Start: 2019-03-11 | End: 2019-10-11 | Stop reason: SDUPTHER

## 2019-03-11 NOTE — TELEPHONE ENCOUNTER
Patient would like a nurse to call her concerning her celebrex it needs a prio authorization. Patient is out of her medication. Please advise      Requested Prescriptions     Pending Prescriptions Disp Refills    celecoxib (CELEBREX) 200 mg capsule 60 Cap 11     Sig: Take 1 Cap by mouth two (2) times daily as needed for Pain.

## 2019-03-11 NOTE — TELEPHONE ENCOUNTER
PCP: Rommel Blackwell MD    Last appt: 2/4/2019  Future Appointments   Date Time Provider Mary Orozco   5/7/2019 11:15 AM Rommel Blackwell MD [facility] None       Requested Prescriptions     Pending Prescriptions Disp Refills    celecoxib (CELEBREX) 200 mg capsule 60 Cap 11     Sig: Take 1 Cap by mouth two (2) times daily as needed for Pain.

## 2019-04-30 ENCOUNTER — HOSPITAL ENCOUNTER (OUTPATIENT)
Dept: LAB | Age: 84
Discharge: HOME | End: 2019-04-30

## 2019-04-30 LAB — XX-LABCORP SPECIMEN COL,LCBCF: NORMAL

## 2019-04-30 PROCEDURE — 99001 SPECIMEN HANDLING PT-LAB: CPT

## 2019-05-01 LAB
ALBUMIN SERPL-MCNC: 3.8 G/DL (ref 3.5–4.7)
ALBUMIN/GLOB SERPL: 1.4 {RATIO} (ref 1.2–2.2)
ALP SERPL-CCNC: 124 IU/L (ref 39–117)
ALT SERPL-CCNC: 9 IU/L (ref 0–32)
AST SERPL-CCNC: 12 IU/L (ref 0–40)
BILIRUB SERPL-MCNC: 0.4 MG/DL (ref 0–1.2)
BUN SERPL-MCNC: 12 MG/DL (ref 8–27)
BUN/CREAT SERPL: 15 (ref 12–28)
CALCIUM SERPL-MCNC: 9.3 MG/DL (ref 8.7–10.3)
CHLORIDE SERPL-SCNC: 105 MMOL/L (ref 96–106)
CHOLEST SERPL-MCNC: 188 MG/DL (ref 100–199)
CO2 SERPL-SCNC: 24 MMOL/L (ref 20–29)
CREAT SERPL-MCNC: 0.79 MG/DL (ref 0.57–1)
GLOBULIN SER CALC-MCNC: 2.7 G/DL (ref 1.5–4.5)
GLUCOSE SERPL-MCNC: 78 MG/DL (ref 65–99)
HDLC SERPL-MCNC: 54 MG/DL
INTERPRETATION, 910389: NORMAL
LDLC SERPL CALC-MCNC: 116 MG/DL (ref 0–99)
POTASSIUM SERPL-SCNC: 4.6 MMOL/L (ref 3.5–5.2)
PROT SERPL-MCNC: 6.5 G/DL (ref 6–8.5)
SODIUM SERPL-SCNC: 142 MMOL/L (ref 134–144)
SPECIMEN STATUS REPORT, ROLRST: NORMAL
TRIGL SERPL-MCNC: 90 MG/DL (ref 0–149)
VLDLC SERPL CALC-MCNC: 18 MG/DL (ref 5–40)

## 2019-05-07 ENCOUNTER — OFFICE VISIT (OUTPATIENT)
Dept: FAMILY MEDICINE CLINIC | Age: 84
End: 2019-05-07

## 2019-05-07 VITALS
DIASTOLIC BLOOD PRESSURE: 74 MMHG | HEIGHT: 64 IN | BODY MASS INDEX: 38.55 KG/M2 | SYSTOLIC BLOOD PRESSURE: 128 MMHG | TEMPERATURE: 97.7 F | HEART RATE: 61 BPM | WEIGHT: 225.8 LBS | RESPIRATION RATE: 20 BRPM

## 2019-05-07 DIAGNOSIS — R60.0 EDEMA OF BOTH LEGS: ICD-10-CM

## 2019-05-07 DIAGNOSIS — E78.00 HYPERCHOLESTEROLEMIA: ICD-10-CM

## 2019-05-07 DIAGNOSIS — Z71.89 ACP (ADVANCE CARE PLANNING): ICD-10-CM

## 2019-05-07 DIAGNOSIS — Z00.00 MEDICARE ANNUAL WELLNESS VISIT, SUBSEQUENT: Primary | ICD-10-CM

## 2019-05-07 RX ORDER — FUROSEMIDE 20 MG/1
TABLET ORAL
Qty: 30 TAB | Refills: 3 | Status: SHIPPED | OUTPATIENT
Start: 2019-05-07 | End: 2019-10-11 | Stop reason: SDUPTHER

## 2019-05-07 NOTE — PATIENT INSTRUCTIONS
Medicare Wellness Visit, Female     The best way to live healthy is to have a lifestyle where you eat a well-balanced diet, exercise regularly, limit alcohol use, and quit all forms of tobacco/nicotine, if applicable. Regular preventive services are another way to keep healthy. Preventive services (vaccines, screening tests, monitoring & exams) can help personalize your care plan, which helps you manage your own care. Screening tests can find health problems at the earliest stages, when they are easiest to treat. [facility] follows the current, evidence-based guidelines published by the [facility] ([facility]) when recommending preventive services for our patients. Because we follow these guidelines, sometimes recommendations change over time as research supports it. (For example, mammograms used to be recommended annually. Even though Medicare will still pay for an annual mammogram, the newer guidelines recommend a mammogram every two years for women of average risk.)  Of course, you and your doctor may decide to screen more often for some diseases, based on your risk and your health status. Preventive services for you include:  - Medicare offers their members a free annual wellness visit, which is time for you and your primary care provider to discuss and plan for your preventive service needs. Take advantage of this benefit every year!  -All adults over the age of 72 should receive the recommended pneumonia vaccines. Current USPSTF guidelines recommend a series of two vaccines for the best pneumonia protection.   -All adults should have a flu vaccine yearly and a tetanus vaccine every 10 years. All adults age 61 and older should receive a shingles vaccine once in their lifetime.    -A bone mass density test is recommended when a woman turns 65 to screen for osteoporosis. This test is only recommended one time, as a screening.  Some providers will use this same test as a disease monitoring tool if you already have osteoporosis. -All adults age 38-68 who are overweight should have a diabetes screening test once every three years.   -Other screening tests and preventive services for persons with diabetes include: an eye exam to screen for diabetic retinopathy, a kidney function test, a foot exam, and stricter control over your cholesterol.   -Cardiovascular screening for adults with routine risk involves an electrocardiogram (ECG) at intervals determined by your doctor.   -Colorectal cancer screenings should be done for adults age 54-65 with no increased risk factors for colorectal cancer. There are a number of acceptable methods of screening for this type of cancer. Each test has its own benefits and drawbacks. Discuss with your doctor what is most appropriate for you during your annual wellness visit. The different tests include: colonoscopy (considered the best screening method), a fecal occult blood test, a fecal DNA test, and sigmoidoscopy. -Breast cancer screenings are recommended every other year for women of normal risk, age 54-69.  -Cervical cancer screenings for women over age 72 are only recommended with certain risk factors.   -All adults born between [facility] should be screened once for Hepatitis C.      Here is a list of your current Health Maintenance items (your personalized list of preventive services) with a due date:  Health Maintenance Due   Topic Date Due    Shingles Vaccine (1 of 2) 08/30/1987    Pneumococcal Vaccine (1 of 2 - PCV13) 08/30/2002

## 2019-05-07 NOTE — PROGRESS NOTES
Advance Care Planning (ACP) Provider Note - Comprehensive     Date of ACP Conversation: 05/07/19  Persons included in Conversation:  patient and family  Length of ACP Conversation in minutes:  16 minutes    Authorized Decision Maker (if patient is incapable of making informed decisions): This person is: Other Legally Authorized Decision Maker (e.g. Next of Kin)            General ACP for ALL Patients with Decision Making Capacity:   Importance of advance care planning, including choosing a healthcare agent to communicate patient's healthcare decisions if patient lost the ability to make decisions, such as after a sudden illness or accident  Understanding of the healthcare agent role was assessed and information provided  Exploration of values, goals, and preferences if recovery is not expected, even with continued medical treatment in the event of: Imminent death  Severe, permanent brain injury  \"In these circumstances, what matters most to you? \"  Care focused more on comfort or quality of life.     Review of Existing Advance Directive:  pt does not have an AD    For Serious or Chronic Illness:  Understanding of medical condition      Interventions Provided:  Recommended completion of Advance Directive form after review of ACP materials and conversation with prospective healthcare agent

## 2019-05-07 NOTE — PROGRESS NOTES
Vijay Titus presents today for   Chief Complaint   Patient presents with    Annual Wellness Visit     Medicare    Cholesterol Problem     high chol    Arthritis    Results     discuss lab results       Is someone accompanying this pt? Yes,son    Is the patient using any DME equipment during OV? Yes, walker    Depression Screening:  3 most recent PHQ Screens 2/4/2019   Little interest or pleasure in doing things Not at all   Feeling down, depressed, irritable, or hopeless Not at all   Total Score PHQ 2 0       Learning Assessment:  Learning Assessment 2/4/2019   PRIMARY LEARNER Patient   HIGHEST LEVEL OF EDUCATION - PRIMARY LEARNER  DID NOT GRADUATE [facility] PRIMARY LEARNER NONE   CO-LEARNER CAREGIVER No   PRIMARY LANGUAGE ENGLISH    NEED -   LEARNER PREFERENCE PRIMARY LISTENING   ANSWERED BY self   RELATIONSHIP SELF       Abuse Screening:  Abuse Screening Questionnaire 2/4/2019   Do you ever feel afraid of your partner? N   Are you in a relationship with someone who physically or mentally threatens you? N   Is it safe for you to go home? Y       Fall Screening  Fall Risk Assessment, last 12 mths 2/4/2019   Able to walk? Yes   Fall in past 12 months? Yes   Fall with injury? Yes   Number of falls in past 12 months 2   Fall Risk Score 3         Health Maintenance Due   Topic Date Due    Shingrix Vaccine Age 49> (1 of 2) 08/30/1987    Pneumococcal 65+ years (1 of 2 - PCV13) 08/30/2002   . Health Maintenance reviewed and discussed and ordered per Provider. Vijay Titus is updated on all     Coordination of Care  1. Have you been to the ER, urgent care clinic since your last visit? Hospitalized since your last visit? no    2. Have you seen or consulted any other health care providers outside of the [address] [place] [address] since your last visit? Include any pap smears or colon screening. yes          2. Per patient no changes to their ACP contact no.         This is the Subsequent Medicare Annual Wellness Exam, performed 12 months or more after the Initial AWV or the last Subsequent AWV    I have reviewed the patient's medical history in detail and updated the computerized patient record. History     Past Medical History:   Diagnosis Date    Decreased calculated GFR 12/5/2014    High vitamin D level 12/6/2014    Vitamin D 25-Hydroxy (12/06/2014) = 138.9     History of echocardiogram 08/29/2014    EF 60%. No WMA. Mild conc LVH. Gr 1 DDfx. Mild RVE. Mild CATRACHO. Sm right & sm left pleural effusion.  Hypercholesterolemia     Lower extremity venous duplex 10/02/2014    No DVT bilaterally.  Numbness in both hands     Osteoarthritis     Osteoarthritis of right hip     Osteoporosis     Peripheral neuropathy     Peripheral vascular disease (HCC)     Postoperative anemia due to acute blood loss     Vaginal fibroids     resolved s/p hyst    Varicose veins       Past Surgical History:   Procedure Laterality Date    HX CHOLECYSTECTOMY      HX HERNIA REPAIR  2010    abdominal, had 2nd surgery for infection    HX HIP FRACTURE TX Left     HX HIP REPLACEMENT Right 12/02/2014    S/P Right total hip replacement (12/02/2014 - Dr. Saeed Coronado)   [facility]    HX OTHER SURGICAL  03/07/2018    Left foot toe surgery-DANA VARNER @ 1 foot 2 foot    TOTAL KNEE ARTHROPLASTY  2006    left    TOTAL KNEE ARTHROPLASTY  2001    right    VASCULAR SURGERY PROCEDURE UNLIST      Bilateral leg vein stripping     Current Outpatient Medications   Medication Sig Dispense Refill    furosemide (LASIX) 20 mg tablet TAKE ONE TABLET BY MOUTH DAILY as needed FOR EDEMA 30 Tab 3    celecoxib (CELEBREX) 200 mg capsule Take 1 Cap by mouth two (2) times daily as needed for Pain. 60 Cap 11    atorvastatin (LIPITOR) 40 mg tablet Take 1 Tab by mouth daily. 30 Tab 5    fluticasone (FLONASE) 50 mcg/actuation nasal spray 2 Sprays by Both Nostrils route daily.  1 Bottle 5    VITAMIN D3 1,000 unit tablet       gabapentin (NEURONTIN) 300 mg capsule Take 300 mg by mouth three (3) times daily.  cyanocobalamin (VITAMIN B-12) 500 mcg tablet Take 500 mcg by mouth daily.  miscellaneous medical supply misc DISPENSE (1) BEDSIDE COMMODE. DX:M15.9,Z74.09,Z96.641,M62.81,Z91.81 1 Each 0     No Known Allergies  Family History   Problem Relation Age of Onset   [facility] Arthritis-rheumatoid Mother     Heart Attack Mother    [facility] Arthritis-rheumatoid Father     Other Father         gangrene   [facility] Arthritis-osteo Sister     Other Brother         pna    Arthritis-osteo Brother     Cancer Daughter         in remission    Arthritis-osteo Brother     Diabetes Brother     Arthritis-osteo Sister      Social History     Tobacco Use    Smoking status: Former Smoker     Years: 1.00     Types: Cigarettes     Last attempt to quit: 1982     Years since quittin.3    Smokeless tobacco: Never Used   Substance Use Topics    Alcohol use: No     Comment: Quit alcohol in      Patient Active Problem List   Diagnosis Code    High vitamin D level E67.3    Dyslipidemia E78.5    Varicose veins I83.90    Osteoarthritis M19.90    Osteoporosis M81.0    Status post right hip replacement Z96.641    Impaired mobility and ADLs Z74.09    Peripheral neuropathy G62.9    Peripheral vascular disease (HCC) I73.9    Postoperative anemia due to acute blood loss D62    Osteoarthritis of right hip M16.11    Decreased calculated GFR R94.4    History of kidney stones Z87.442    Numbness in both hands R20.0    Small bowel obstruction (HCC) K56.609    Hypercholesterolemia E78.00    Fx intertrochanteric hip (HCC) S72.143A    Muscle weakness (generalized) M62.81    Difficulty in walking, not elsewhere classified R26.2    ACP (advance care planning) Z71.89    Severe obesity (BMI 35.0-39. 9) E66.01    Age-related cataract of both eyes H25.9       Depression Risk Factor Screening:     3 most recent PHQ Screens 2019 Little interest or pleasure in doing things Not at all   Feeling down, depressed, irritable, or hopeless Not at all   Total Score PHQ 2 0     Alcohol Risk Factor Screening: You do not drink alcohol or very rarely. Functional Ability and Level of Safety:   Hearing Loss  Hearing is good. Activities of Daily Living  The home contains: no safety equipment. Patient does total self care    Fall Risk  Fall Risk Assessment, last 12 mths 2/4/2019   Able to walk? Yes   Fall in past 12 months? Yes   Fall with injury? Yes   Number of falls in past 12 months 2   Fall Risk Score 3       Abuse Screen  Patient is not abused    Cognitive Screening   Evaluation of Cognitive Function:  Has your family/caregiver stated any concerns about your memory: no  Normal    Patient Care Team   Patient Care Team:  Carlos Charles MD as PCP - General (Family Practice)  Olegario Santana MD (Neurology)  Alexander Ramirez DO (Cardiology)  Aura Peterson MD (Orthopedic Surgery)  Augustina Conway MD (General Surgery)  Marissa Arellano MD (Cardiology)    Assessment/Plan   Education and counseling provided:  Are appropriate based on today's review and evaluation  End-of-Life planning (with patient's consent)  Pneumococcal Vaccine    Diagnoses and all orders for this visit:    1. Medicare annual wellness visit, subsequent    2.  ACP (advance care planning)      Health Maintenance Due   Topic Date Due    Shingrix Vaccine Age 49> (1 of 2) 08/30/1987    Pneumococcal 65+ years (1 of 2 - PCV13) 08/30/2002

## 2019-05-07 NOTE — PROGRESS NOTES
Chief Complaint   Patient presents with    Annual Wellness Visit     Medicare    Cholesterol Problem     high chol    Arthritis    Results     discuss lab results         HPI    Carlene Mallory is a 82 y.o. female presenting today for  3 months  follow up of hld and arthritis. Pt cont to see neuro for neuropathic pain. She had vitamin levels checked there. Pt is still on gabapentin. Patient had labs on 4/30/19. Labs reviewed in detail with patient. She is taking her cholesterol med now. Patient does need medication refills today. ROS  Review of Systems   Constitutional: Negative. HENT: Negative. Respiratory: Negative. Cardiovascular: Negative. All other systems reviewed and are negative. Physical Exam  Physical Exam   Nursing note and vitals reviewed. Constitutional: She is oriented to person, place, and time. She appears well-developed and well-nourished. HENT:   Head: Normocephalic and atraumatic. Right Ear: External ear normal.   Left Ear: External ear normal.   Nose: Nose normal.   Eyes: Conjunctivae and EOM are normal.   Neck: Normal range of motion. Neck supple. No JVD present. Carotid bruit is not present. No thyromegaly present. Cardiovascular: Normal rate, regular rhythm, normal heart sounds and intact distal pulses. Exam reveals no gallop and no friction rub. No murmur heard. Pulmonary/Chest: Effort normal and breath sounds normal. She has no wheezes. She has no rhonchi. She has no rales. Abdominal: Soft. Bowel sounds are normal.   Musculoskeletal: Normal range of motion. Neurological: She is alert and oriented to person, place, and time. Coordination normal.   Skin: Skin is warm and dry. Psychiatric: She has a normal mood and affect. Her behavior is normal. Judgment and thought content normal.     Diagnoses and all orders for this visit:    1. Hypercholesterolemia  Stable, cont pres tx plan.      2. Edema of both legs  -     furosemide (LASIX) 20 mg tablet; TAKE ONE TABLET BY MOUTH DAILY as needed FOR EDEMA      Follow-up and Dispositions    · Return in about 3 months (around 8/7/2019) for high cholesterol.

## 2019-08-14 ENCOUNTER — TELEPHONE (OUTPATIENT)
Dept: FAMILY MEDICINE CLINIC | Age: 84
End: 2019-08-14

## 2019-09-09 ENCOUNTER — OFFICE VISIT (OUTPATIENT)
Dept: FAMILY MEDICINE CLINIC | Age: 84
End: 2019-09-09

## 2019-09-09 VITALS
RESPIRATION RATE: 18 BRPM | DIASTOLIC BLOOD PRESSURE: 77 MMHG | BODY MASS INDEX: 39.23 KG/M2 | WEIGHT: 229.8 LBS | HEART RATE: 61 BPM | SYSTOLIC BLOOD PRESSURE: 122 MMHG | TEMPERATURE: 98.2 F | HEIGHT: 64 IN

## 2019-09-09 DIAGNOSIS — M19.91 PRIMARY OSTEOARTHRITIS, UNSPECIFIED SITE: ICD-10-CM

## 2019-09-09 DIAGNOSIS — L98.9 SKIN LESION OF BACK: ICD-10-CM

## 2019-09-09 DIAGNOSIS — Z28.21 INFLUENZA VACCINE REFUSED: ICD-10-CM

## 2019-09-09 DIAGNOSIS — E78.00 HYPERCHOLESTEROLEMIA: ICD-10-CM

## 2019-09-09 DIAGNOSIS — E78.00 HYPERCHOLESTEROLEMIA: Primary | ICD-10-CM

## 2019-09-09 DIAGNOSIS — R60.0 EDEMA OF BOTH LEGS: ICD-10-CM

## 2019-09-09 NOTE — PROGRESS NOTES
Chief Complaint   Patient presents with    Cholesterol Problem     follow up   [facility]     Patient stated that she has a mole on her back that was scratched 6 months ago and is now has a scabe on it. HPI    Tabatha High is a 82 y.o. female presenting today for  4 months  follow up of hld. Pt reports that she is keeping her legs elevated and has minimized salt. She is not taking the lasix as her swelling has improved. No recent labs. Patient does not need medication refills today. New concerns today: pt c/o pain of her R and L 2nd and 3rd fingers related to her oa. She cont to take celebrex. Pt c/o having a mole on  Her R upper back that was scratched off at one point. It remains itchy. The coloration is a bit different than her others. Review of Systems   Constitutional: Negative. HENT: Negative. Respiratory: Negative. Cardiovascular: Negative. Musculoskeletal: Positive for joint pain. Skin:        Mole on back   All other systems reviewed and are negative. Physical Exam  Physical Exam   Nursing note and vitals reviewed. Constitutional: She is oriented to person, place, and time. She appears well-developed and well-nourished. HENT:   Head: Normocephalic and atraumatic. Right Ear: External ear normal.   Left Ear: External ear normal.   Nose: Nose normal.   Eyes: Conjunctivae and EOM are normal.   Neck: Normal range of motion. Neck supple. No JVD present. Carotid bruit is not present. No thyromegaly present. Cardiovascular: Normal rate, regular rhythm, normal heart sounds and intact distal pulses. Exam reveals no gallop and no friction rub. No murmur heard. Pulmonary/Chest: Effort normal and breath sounds normal. She has no wheezes. She has no rhonchi. She has no rales. Abdominal: Soft. Bowel sounds are normal.   Musculoskeletal: Normal range of motion. Neurological: She is alert and oriented to person, place, and time.  Coordination normal.   Skin: Skin is warm and dry. Hyperpigmented lesion on R upper back w irregular borders  Psychiatric: She has a normal mood and affect. Her behavior is normal. Judgment and thought content normal.     Diagnoses and all orders for this visit:    1. Hypercholesterolemia  -     METABOLIC PANEL, COMPREHENSIVE; Future  -     LIPID PANEL; Future  Labs pending    2. Edema of both legs  Improved. Cont pres tx plan. 3. Primary osteoarthritis, unspecified site  Stable, cont pres tx plan. 4. Skin lesion of back  Discussed need to see derm. Pt to schedule. 5. Influenza vaccine refused      Follow-up and Dispositions    · Return in about 3 months (around 12/9/2019) for high cholesterol, arthritis.

## 2019-09-09 NOTE — Clinical Note
I think pt had a dermatologist that she planned to call for an appt. Please call pt to verify. If she does not have a name/number, please give her contact info for a dermatologist nearby.

## 2019-09-09 NOTE — PROGRESS NOTES
Negrita Fletcher presents today for   Chief Complaint   Patient presents with    Cholesterol Problem     follow up   [facility]     Patient stated that she has a mole on her back that was scratched 6 months ago and is now has a scabe on it. Is someone accompanying this pt? Yes son    Is the patient using any DME equipment during OV? Yes walker    Depression Screening:  3 most recent PHQ Screens 2/4/2019   Little interest or pleasure in doing things Not at all   Feeling down, depressed, irritable, or hopeless Not at all   Total Score PHQ 2 0       Learning Assessment:  Learning Assessment 2/4/2019   PRIMARY LEARNER Patient   HIGHEST LEVEL OF EDUCATION - PRIMARY LEARNER  DID NOT GRADUATE [facility] PRIMARY LEARNER NONE   CO-LEARNER CAREGIVER No   PRIMARY LANGUAGE ENGLISH    NEED -   LEARNER PREFERENCE PRIMARY LISTENING   ANSWERED BY self   RELATIONSHIP SELF       Abuse Screening:  Abuse Screening Questionnaire 2/4/2019   Do you ever feel afraid of your partner? N   Are you in a relationship with someone who physically or mentally threatens you? N   Is it safe for you to go home? Y       Fall Screening  Fall Risk Assessment, last 12 mths 9/9/2019   Able to walk? Yes   Fall in past 12 months? No   Fall with injury? -   Number of falls in past 12 months -   Fall Risk Score -       Generalized Anxiety  No flowsheet data found. Health Maintenance Due   Topic Date Due    Shingrix Vaccine Age 49> (1 of 2) 08/30/1987   . Health Maintenance reviewed and discussed and ordered per Provider. Negrita Fletcher is updated on all     Coordination of Care  1. Have you been to the ER, urgent care clinic since your last visit? Hospitalized since your last visit? no    2. Have you seen or consulted any other health care providers outside of the [address] [place] [address] since your last visit? Include any pap smears or colon screening. no      Advance Directive:  1.  Do you have an advance directive in place? Patient Reply:yes  2. Per patient no changes to their ACP contact no.

## 2019-10-07 ENCOUNTER — TELEPHONE (OUTPATIENT)
Dept: FAMILY MEDICINE CLINIC | Age: 84
End: 2019-10-07

## 2019-10-10 ENCOUNTER — TELEPHONE (OUTPATIENT)
Dept: FAMILY MEDICINE CLINIC | Age: 84
End: 2019-10-10

## 2019-10-14 ENCOUNTER — TELEPHONE (OUTPATIENT)
Dept: FAMILY MEDICINE CLINIC | Age: 84
End: 2019-10-14

## 2019-10-14 DIAGNOSIS — J30.2 CHRONIC SEASONAL ALLERGIC RHINITIS: ICD-10-CM

## 2019-10-14 NOTE — TELEPHONE ENCOUNTER
Last paper work was sent to [facility] for supply's on 8/14/19. No other orders came in for her.

## 2019-10-14 NOTE — TELEPHONE ENCOUNTER
PCP: Altamease Fleischer, MD    Last appt: 9/9/2019  Future Appointments   Date Time Provider Mary Orozco   12/11/2019 11:15 AM Altamease Fleischer, MD [facility] None       Requested Prescriptions     Pending Prescriptions Disp Refills    fluticasone propionate (FLONASE) 50 mcg/actuation nasal spray 16 g 12

## 2019-10-15 RX ORDER — FLUTICASONE PROPIONATE 50 MCG
SPRAY, SUSPENSION (ML) NASAL
Qty: 16 G | Refills: 12 | Status: SHIPPED | OUTPATIENT
Start: 2019-10-15 | End: 2020-10-30 | Stop reason: SDUPTHER

## 2019-11-30 ENCOUNTER — HOSPITAL ENCOUNTER (OUTPATIENT)
Dept: LAB | Age: 84
Discharge: HOME | End: 2019-11-30

## 2019-11-30 LAB — XX-LABCORP SPECIMEN COL,LCBCF: NORMAL

## 2019-11-30 PROCEDURE — 99001 SPECIMEN HANDLING PT-LAB: CPT

## 2019-12-01 LAB
ALBUMIN SERPL-MCNC: 3.8 G/DL (ref 3.5–4.7)
ALBUMIN/GLOB SERPL: 1.4 {RATIO} (ref 1.2–2.2)
ALP SERPL-CCNC: 134 IU/L (ref 39–117)
ALT SERPL-CCNC: 10 IU/L (ref 0–32)
AST SERPL-CCNC: 12 IU/L (ref 0–40)
BILIRUB SERPL-MCNC: 0.6 MG/DL (ref 0–1.2)
BUN SERPL-MCNC: 13 MG/DL (ref 8–27)
BUN/CREAT SERPL: 14 (ref 12–28)
CALCIUM SERPL-MCNC: 9.6 MG/DL (ref 8.7–10.3)
CHLORIDE SERPL-SCNC: 104 MMOL/L (ref 96–106)
CHOLEST SERPL-MCNC: 293 MG/DL (ref 100–199)
CO2 SERPL-SCNC: 23 MMOL/L (ref 20–29)
CREAT SERPL-MCNC: 0.91 MG/DL (ref 0.57–1)
GLOBULIN SER CALC-MCNC: 2.8 G/DL (ref 1.5–4.5)
GLUCOSE SERPL-MCNC: 87 MG/DL (ref 65–99)
HDLC SERPL-MCNC: 56 MG/DL
INTERPRETATION, 910389: NORMAL
INTERPRETATION: NORMAL
LDLC SERPL CALC-MCNC: 219 MG/DL (ref 0–99)
PDF IMAGE, 910387: NORMAL
POTASSIUM SERPL-SCNC: 4.1 MMOL/L (ref 3.5–5.2)
PROT SERPL-MCNC: 6.6 G/DL (ref 6–8.5)
SODIUM SERPL-SCNC: 139 MMOL/L (ref 134–144)
TRIGL SERPL-MCNC: 92 MG/DL (ref 0–149)
VLDLC SERPL CALC-MCNC: 18 MG/DL (ref 5–40)

## 2019-12-01 NOTE — PROGRESS NOTES
I haven't seen this lady since March of 2016, so must have been ordered by Dr. Sabina Huddleston. Nothing for us to do.  ES

## 2019-12-11 ENCOUNTER — OFFICE VISIT (OUTPATIENT)
Dept: FAMILY MEDICINE CLINIC | Age: 84
End: 2019-12-11

## 2019-12-11 VITALS
BODY MASS INDEX: 39.2 KG/M2 | DIASTOLIC BLOOD PRESSURE: 72 MMHG | HEART RATE: 82 BPM | SYSTOLIC BLOOD PRESSURE: 126 MMHG | RESPIRATION RATE: 18 BRPM | HEIGHT: 64 IN | WEIGHT: 229.6 LBS | TEMPERATURE: 97.8 F

## 2019-12-11 DIAGNOSIS — E78.00 HYPERCHOLESTEROLEMIA: ICD-10-CM

## 2019-12-11 DIAGNOSIS — R60.0 EDEMA OF BOTH LEGS: ICD-10-CM

## 2019-12-11 DIAGNOSIS — R26.2 DIFFICULTY IN WALKING, NOT ELSEWHERE CLASSIFIED: ICD-10-CM

## 2019-12-11 DIAGNOSIS — E78.00 HYPERCHOLESTEROLEMIA: Primary | ICD-10-CM

## 2019-12-11 RX ORDER — ATORVASTATIN CALCIUM 40 MG/1
40 TABLET, FILM COATED ORAL DAILY
Qty: 30 TAB | Refills: 12 | Status: SHIPPED | OUTPATIENT
Start: 2019-12-11 | End: 2021-02-01 | Stop reason: SDUPTHER

## 2019-12-11 RX ORDER — FUROSEMIDE 20 MG/1
TABLET ORAL
Qty: 30 TAB | Refills: 12 | Status: SHIPPED | OUTPATIENT
Start: 2019-12-11 | End: 2020-07-28 | Stop reason: SDUPTHER

## 2019-12-11 NOTE — PROGRESS NOTES
Lindsay King presents today for   Chief Complaint   Patient presents with    Cholesterol Problem     follow up   [facility]     completed 11/30/19    Medication Refill       Is someone accompanying this pt? Yes son    Is the patient using any DME equipment during OV? Yes walker    Depression Screening:  3 most recent PHQ Screens 2/4/2019   Little interest or pleasure in doing things Not at all   Feeling down, depressed, irritable, or hopeless Not at all   Total Score PHQ 2 0       Learning Assessment:  Learning Assessment 2/4/2019   PRIMARY LEARNER Patient   HIGHEST LEVEL OF EDUCATION - PRIMARY LEARNER  DID NOT GRADUATE [facility] PRIMARY LEARNER NONE   CO-LEARNER CAREGIVER No   PRIMARY LANGUAGE ENGLISH    NEED -   LEARNER PREFERENCE PRIMARY LISTENING   ANSWERED BY self   RELATIONSHIP SELF       Abuse Screening:  Abuse Screening Questionnaire 2/4/2019   Do you ever feel afraid of your partner? N   Are you in a relationship with someone who physically or mentally threatens you? N   Is it safe for you to go home? Y       Fall Screening  Fall Risk Assessment, last 12 mths 9/9/2019   Able to walk? Yes   Fall in past 12 months? No   Fall with injury? -   Number of falls in past 12 months -   Fall Risk Score -       Generalized Anxiety  No flowsheet data found. Health Maintenance Due   Topic Date Due    Shingrix Vaccine Age 49> (1 of 2) 08/30/1987   . Health Maintenance reviewed and discussed and ordered per Provider. Lindsay King is updated on all     Coordination of Care  1. Have you been to the ER, urgent care clinic since your last visit? Hospitalized since your last visit? no    2. Have you seen or consulted any other health care providers outside of the [address] [place] [address] since your last visit? Include any pap smears or colon screening. no      Advance Directive:  1. Do you have an advance directive in place? Patient Reply:yes  2.   Per patient no changes to their ACP contact no.

## 2019-12-11 NOTE — PROGRESS NOTES
Chief Complaint   Patient presents with    Cholesterol Problem     follow up   [facility]     completed 11/30/19    Medication Refill         HPI    Jesusita Almeida is a 82 y.o. female presenting today for 3 months  follow up of hld, oa. Patient had labs on 11/30/19. Labs reviewed in detail with patient. Pt admits to med non-compliance. Patient does need medication refills today. New concerns today: none      ROS  Review of Systems   Constitutional: Negative. HENT: Negative. Respiratory: Negative. Cardiovascular: Negative. All other systems reviewed and are negative. Physical Exam  Physical Exam   Nursing note and vitals reviewed. Constitutional: She is oriented to person, place, and time. She appears well-developed and well-nourished. HENT:   Head: Normocephalic and atraumatic. Right Ear: External ear normal.   Left Ear: External ear normal.   Nose: Nose normal.   Eyes: Conjunctivae and EOM are normal.   Neck: Normal range of motion. Neck supple. No JVD present. Carotid bruit is not present. No thyromegaly present. Cardiovascular: Normal rate, regular rhythm, normal heart sounds and intact distal pulses. Exam reveals no gallop and no friction rub. No murmur heard. Pulmonary/Chest: Effort normal and breath sounds normal. She has no wheezes. She has no rhonchi. She has no rales. Abdominal: Soft. Bowel sounds are normal.   Musculoskeletal: Normal range of motion. Neurological: She is alert and oriented to person, place, and time. Coordination normal.   Skin: Skin is warm and dry. Psychiatric: She has a normal mood and affect. Her behavior is normal. Judgment and thought content normal.     Diagnoses and all orders for this visit:    1. Hypercholesterolemia  -     atorvastatin (LIPITOR) 40 mg tablet; Take 1 Tab by mouth daily.  -     METABOLIC PANEL, COMPREHENSIVE; Future  -     LIPID PANEL; Future    2.  Edema of both legs  -     furosemide (LASIX) 20 mg tablet; TAKE ONE TABLET BY MOUTH DAILY as needed FOR EDEMA      Follow-up and Dispositions    · Return in about 3 months (around 3/11/2020) for high cholesterol, arthritis.

## 2019-12-27 ENCOUNTER — APPOINTMENT (OUTPATIENT)
Dept: GENERAL RADIOLOGY | Age: 84
End: 2019-12-27
Attending: EMERGENCY MEDICINE
Payer: MEDICAID

## 2019-12-27 ENCOUNTER — HOSPITAL ENCOUNTER (EMERGENCY)
Age: 84
Discharge: HOME | End: 2019-12-27
Attending: EMERGENCY MEDICINE
Payer: MEDICAID

## 2019-12-27 VITALS
HEART RATE: 74 BPM | OXYGEN SATURATION: 98 % | HEIGHT: 65 IN | TEMPERATURE: 98.4 F | RESPIRATION RATE: 18 BRPM | DIASTOLIC BLOOD PRESSURE: 63 MMHG | SYSTOLIC BLOOD PRESSURE: 122 MMHG | WEIGHT: 230 LBS | BODY MASS INDEX: 38.32 KG/M2

## 2019-12-27 DIAGNOSIS — J06.9 ACUTE UPPER RESPIRATORY INFECTION: Primary | ICD-10-CM

## 2019-12-27 LAB
ALBUMIN SERPL-MCNC: 3.1 G/DL (ref 3.4–5)
ALBUMIN/GLOB SERPL: 0.8 {RATIO} (ref 0.8–1.7)
ALP SERPL-CCNC: 133 U/L (ref 45–117)
ALT SERPL-CCNC: 42 U/L (ref 13–56)
ANION GAP SERPL CALC-SCNC: 11 MMOL/L (ref 3–18)
AST SERPL-CCNC: 31 U/L (ref 10–38)
BASOPHILS # BLD: 0 K/UL (ref 0–0.1)
BASOPHILS NFR BLD: 0 % (ref 0–2)
BILIRUB SERPL-MCNC: 0.6 MG/DL (ref 0.2–1)
BUN SERPL-MCNC: 11 MG/DL (ref 7–18)
BUN/CREAT SERPL: 14 (ref 12–20)
CALCIUM SERPL-MCNC: 9.3 MG/DL (ref 8.5–10.1)
CHLORIDE SERPL-SCNC: 103 MMOL/L (ref 100–111)
CO2 SERPL-SCNC: 27 MMOL/L (ref 21–32)
CREAT SERPL-MCNC: 0.8 MG/DL (ref 0.6–1.3)
DIFFERENTIAL METHOD BLD: ABNORMAL
EOSINOPHIL # BLD: 0.1 K/UL (ref 0–0.4)
EOSINOPHIL NFR BLD: 3 % (ref 0–5)
ERYTHROCYTE [DISTWIDTH] IN BLOOD BY AUTOMATED COUNT: 14.2 % (ref 11.6–14.5)
GLOBULIN SER CALC-MCNC: 4 G/DL (ref 2–4)
GLUCOSE SERPL-MCNC: 97 MG/DL (ref 74–99)
HCT VFR BLD AUTO: 41.2 % (ref 35–45)
HGB BLD-MCNC: 13.5 G/DL (ref 12–16)
LYMPHOCYTES # BLD: 1 K/UL (ref 0.9–3.6)
LYMPHOCYTES NFR BLD: 41 % (ref 21–52)
MCH RBC QN AUTO: 27.8 PG (ref 24–34)
MCHC RBC AUTO-ENTMCNC: 32.8 G/DL (ref 31–37)
MCV RBC AUTO: 84.8 FL (ref 74–97)
MONOCYTES # BLD: 0.3 K/UL (ref 0.05–1.2)
MONOCYTES NFR BLD: 13 % (ref 3–10)
NEUTS SEG # BLD: 1 K/UL (ref 1.8–8)
NEUTS SEG NFR BLD: 43 % (ref 40–73)
PLATELET # BLD AUTO: 164 K/UL (ref 135–420)
PMV BLD AUTO: 9.7 FL (ref 9.2–11.8)
POTASSIUM SERPL-SCNC: 3.8 MMOL/L (ref 3.5–5.5)
PROT SERPL-MCNC: 7.1 G/DL (ref 6.4–8.2)
RBC # BLD AUTO: 4.86 M/UL (ref 4.2–5.3)
SODIUM SERPL-SCNC: 141 MMOL/L (ref 136–145)
WBC # BLD AUTO: 2.3 K/UL (ref 4.6–13.2)

## 2019-12-27 PROCEDURE — 99282 EMERGENCY DEPT VISIT SF MDM: CPT

## 2019-12-27 PROCEDURE — 71045 X-RAY EXAM CHEST 1 VIEW: CPT

## 2019-12-27 PROCEDURE — 80053 COMPREHEN METABOLIC PANEL: CPT

## 2019-12-27 PROCEDURE — 85025 COMPLETE CBC W/AUTO DIFF WBC: CPT

## 2019-12-27 RX ORDER — GUAIFENESIN DEXTROMETHORPHAN HYDROBROMIDE ORAL SOLUTION 10; 100 MG/5ML; MG/5ML
10 SOLUTION ORAL
Qty: 1 BOTTLE | Refills: 0 | Status: SHIPPED | OUTPATIENT
Start: 2019-12-27 | End: 2020-01-03

## 2019-12-27 RX ORDER — FLUTICASONE PROPIONATE 50 MCG
2 SPRAY, SUSPENSION (ML) NASAL DAILY
Qty: 1 BOTTLE | Refills: 0 | Status: SHIPPED | OUTPATIENT
Start: 2019-12-27 | End: 2020-06-10 | Stop reason: SDUPTHER

## 2019-12-27 RX ORDER — PSEUDOEPHEDRINE HYDROCHLORIDE 60 MG/1
60 TABLET ORAL
Qty: 20 TAB | Refills: 1 | Status: SHIPPED | OUTPATIENT
Start: 2019-12-27 | End: 2020-01-01

## 2019-12-27 RX ORDER — BENZONATATE 100 MG/1
100 CAPSULE ORAL
Qty: 30 CAP | Refills: 0 | Status: SHIPPED | OUTPATIENT
Start: 2019-12-27 | End: 2020-01-03

## 2019-12-27 RX ORDER — VAPORIZER
1 EACH MISCELLANEOUS
Qty: 1 EACH | Refills: 0 | Status: SHIPPED | OUTPATIENT
Start: 2019-12-27 | End: 2020-06-10 | Stop reason: SDUPTHER

## 2019-12-27 NOTE — ED NOTES
I have reviewed discharge instructions with the patient. The patient verbalized understanding. Patient armband removed and shredded  Pt discharged ambulatory with rolling walker; accompanied by her son.

## 2019-12-27 NOTE — DISCHARGE INSTRUCTIONS
Patient Education        Upper Respiratory Infection (Cold): Care Instructions  Your Care Instructions    An upper respiratory infection, or URI, is an infection of the nose, sinuses, or throat. URIs are spread by coughs, sneezes, and direct contact. The common cold is the most frequent kind of URI. The flu and sinus infections are other kinds of URIs. Almost all URIs are caused by viruses. Antibiotics won't cure them. But you can treat most infections with home care. This may include drinking lots of fluids and taking over-the-counter pain medicine. You will probably feel better in 4 to 10 days. The doctor has checked you carefully, but problems can develop later. If you notice any problems or new symptoms, get medical treatment right away. Follow-up care is a key part of your treatment and safety. Be sure to make and go to all appointments, and call your doctor if you are having problems. It's also a good idea to know your test results and keep a list of the medicines you take. How can you care for yourself at home? · To prevent dehydration, drink plenty of fluids, enough so that your urine is light yellow or clear like water. Choose water and other caffeine-free clear liquids until you feel better. If you have kidney, heart, or liver disease and have to limit fluids, talk with your doctor before you increase the amount of fluids you drink. · Take an over-the-counter pain medicine, such as acetaminophen (Tylenol), ibuprofen (Advil, Motrin), or naproxen (Aleve). Read and follow all instructions on the label. · Before you use cough and cold medicines, check the label. These medicines may not be safe for young children or for people with certain health problems. · Be careful when taking over-the-counter cold or flu medicines and Tylenol at the same time. Many of these medicines have acetaminophen, which is Tylenol. Read the labels to make sure that you are not taking more than the recommended dose.  Too much acetaminophen (Tylenol) can be harmful. · Get plenty of rest.  · Do not smoke or allow others to smoke around you. If you need help quitting, talk to your doctor about stop-smoking programs and medicines. These can increase your chances of quitting for good. When should you call for help? Call 911 anytime you think you may need emergency care. For example, call if:    · You have severe trouble breathing.    Call your doctor now or seek immediate medical care if:    · You seem to be getting much sicker.     · You have new or worse trouble breathing.     · You have a new or higher fever.     · You have a new rash.    Watch closely for changes in your health, and be sure to contact your doctor if:    · You have a new symptom, such as a sore throat, an earache, or sinus pain.     · You cough more deeply or more often, especially if you notice more mucus or a change in the color of your mucus.     · You do not get better as expected. Where can you learn more? Go to http://nita-kady.info/. Enter D905 in the search box to learn more about \"Upper Respiratory Infection (Cold): Care Instructions. \"  Current as of: June 9, 2019  Content Version: 12.2  © 2305-9694 [employer]. Care instructions adapted under license by [employer] (which disclaims liability or warranty for this information). If you have questions about a medical condition or this instruction, always ask your healthcare professional. [address] any warranty or liability for your use of this information.

## 2019-12-27 NOTE — ED PROVIDER NOTES
EMERGENCY DEPARTMENT HISTORY AND PHYSICAL EXAM    Date: 12/27/2019  Patient Name: Jesusita Almeida    History of Presenting Illness     Chief Complaint   Patient presents with    Cough    Chest Congestion    Generalized Body Aches    Diarrhea         History Provided By: Patient and Patient's Son      Additional History (Context): Jesusita Almeida is a 82 y.o. female with hyperlipidemia, obesity, osteoarthritis and PVD who presents with chest congestion cough and some sinus congestion for the past 6 days. Denies fever or difficulty breathing. Denies chills. She has been taking OTC expectorant as needed. Her cough became more intense today so she came in for evaluation. PCP: Yamileth Doll MD    Current Outpatient Medications   Medication Sig Dispense Refill    benzonatate (TESSALON PERLES) 100 mg capsule Take 1 Cap by mouth three (3) times daily as needed for Cough for up to 7 days. 30 Cap 0    pseudoephedrine (SUDAFED) 60 mg tablet Take 1 Tab by mouth every six (6) hours as needed for Congestion for up to 5 days. 20 Tab 1    Camphor-Eucalyptus Oil-Menthol (VICKS VAPORUB) 4.8-1.2-2.6 % oint 1 Actuation(s) by Apply Externally route three (3) times daily as needed for Cough or Other (congestion). 50 g 0    camphor-eucalyptus-menthol (VICKS VAPOSTEAM) liqd 1 Actuation(s) by Does Not Apply route three (3) times daily as needed for Cough or Other (congestion). 1 Bottle 0    Vaporizers (Grafoid WARM STEAM VAPORIZER) misc 1 Actuation(s) by Does Not Apply route three (3) times daily as needed for Cough or Other (congestion). 1 Each 0    fluticasone propionate (FLONASE) 50 mcg/actuation nasal spray 2 Sprays by Both Nostrils route daily. 1 Bottle 0    guaiFENesin-dextromethorphan (SANTANA-TUSSIN DM)  mg/5 mL liqd Take 10 mL by mouth every four (4) hours as needed for Cough for up to 7 days.  1 Bottle 0    furosemide (LASIX) 20 mg tablet TAKE ONE TABLET BY MOUTH DAILY as needed FOR EDEMA 30 Tab 12    atorvastatin (LIPITOR) 40 mg tablet Take 1 Tab by mouth daily. 30 Tab 12    fluticasone propionate (FLONASE) 50 mcg/actuation nasal spray USE 2 SPRAYS IN BOTH NOSTRILS DAILY 16 g 12    celecoxib (CELEBREX) 200 mg capsule Take 1 Cap by mouth two (2) times daily as needed for Pain. 60 Cap 12    miscellaneous medical supply misc DISPENSE (1) BEDSIDE COMMODE. DX:M15.9,Z74.09,Z96.641,M62.81,Z91.81 1 Each 0    VITAMIN D3 1,000 unit tablet       gabapentin (NEURONTIN) 300 mg capsule Take 300 mg by mouth three (3) times daily.  cyanocobalamin (VITAMIN B-12) 500 mcg tablet Take 500 mcg by mouth daily. Past History     Past Medical History:  Past Medical History:   Diagnosis Date    Decreased calculated GFR 12/5/2014    High vitamin D level 12/6/2014    Vitamin D 25-Hydroxy (12/06/2014) = 138.9     History of echocardiogram 08/29/2014    EF 60%. No WMA. Mild conc LVH. Gr 1 DDfx. Mild RVE. Mild CATRACHO. Sm right & sm left pleural effusion.  Hypercholesterolemia     Lower extremity venous duplex 10/02/2014    No DVT bilaterally.     Numbness in both hands     Osteoarthritis     Osteoarthritis of right hip     Osteoporosis     Peripheral neuropathy     Peripheral vascular disease (HCC)     Postoperative anemia due to acute blood loss     Vaginal fibroids     resolved s/p hyst    Varicose veins        Past Surgical History:  Past Surgical History:   Procedure Laterality Date    HX CHOLECYSTECTOMY      HX HERNIA REPAIR  2010    abdominal, had 2nd surgery for infection    HX HIP FRACTURE TX Left     HX HIP REPLACEMENT Right 12/02/2014    S/P Right total hip replacement (12/02/2014 - Dr. Jennifer Rondon)   [address] HX OTHER SURGICAL  03/07/2018    Left foot toe surgery-DANA VARNER @ 1 foot 2 foot    TOTAL KNEE ARTHROPLASTY  2006    left    TOTAL KNEE ARTHROPLASTY  2001    right    VASCULAR SURGERY PROCEDURE UNLIST      Bilateral leg vein stripping       Family History:  Family History Problem Relation Age of Onset   Miramontes Arthritis-rheumatoid Mother     Heart Attack Mother     Arthritis-rheumatoid Father     Other Father         gangrene   Miramontes Arthritis-osteo Sister     Other Brother         pna    Arthritis-osteo Brother     Cancer Daughter         in remission    Arthritis-osteo Brother     Diabetes Brother     Arthritis-osteo Sister        Social History:  Social History     Tobacco Use    Smoking status: Former Smoker     Years: 1.00     Types: Cigarettes     Last attempt to quit: 1982     Years since quittin.0    Smokeless tobacco: Never Used   Substance Use Topics    Alcohol use: No     Comment: Quit alcohol in     Drug use: Yes     Types: Prescription, OTC     Comment: prescribed       Allergies:  No Known Allergies      Review of Systems   Review of Systems   Constitutional: Negative for chills and fatigue. HENT: Positive for congestion and rhinorrhea. Negative for sore throat. Respiratory: Positive for cough. Negative for shortness of breath and wheezing. Cardiovascular: Negative for chest pain. Gastrointestinal: Negative for abdominal pain, nausea and vomiting. All Other Systems Negative  Physical Exam     Vitals:    19 1112   BP: 122/63   Pulse: 74   Resp: 18   Temp: 98.4 °F (36.9 °C)   SpO2: 98%   Weight: 104.3 kg (230 lb)   Height: 5' 5\" (1.651 m)     Physical Exam  Vitals signs and nursing note reviewed. Constitutional:       Appearance: She is well-developed. HENT:      Head: Normocephalic and atraumatic. Mouth/Throat:      Pharynx: No oropharyngeal exudate or posterior oropharyngeal erythema. Eyes:      Pupils: Pupils are equal, round, and reactive to light. Neck:      Thyroid: No thyromegaly. Vascular: No JVD. Trachea: No tracheal deviation. Cardiovascular:      Rate and Rhythm: Normal rate and regular rhythm. Heart sounds: Normal heart sounds. No murmur. No friction rub. No gallop.     Pulmonary:      Effort: Pulmonary effort is normal. No respiratory distress. Breath sounds: Normal breath sounds. No stridor. No wheezing or rales. Chest:      Chest wall: No tenderness. Abdominal:      General: There is no distension. Palpations: Abdomen is soft. There is no mass. Tenderness: There is no tenderness. There is no guarding or rebound. Musculoskeletal:         General: No tenderness. Lymphadenopathy:      Cervical: No cervical adenopathy. Skin:     General: Skin is warm and dry. Coloration: Skin is not pale. Findings: No erythema or rash. Neurological:      Mental Status: She is alert and oriented to person, place, and time. Psychiatric:         Behavior: Behavior normal.         Thought Content: Thought content normal.          Diagnostic Study Results     Labs -     Recent Results (from the past 12 hour(s))   CBC WITH AUTOMATED DIFF    Collection Time: 12/27/19 12:06 PM   Result Value Ref Range    WBC 2.3 (L) 4.6 - 13.2 K/uL    RBC 4.86 4.20 - 5.30 M/uL    HGB 13.5 12.0 - 16.0 g/dL    HCT 41.2 35.0 - 45.0 %    MCV 84.8 74.0 - 97.0 FL    MCH 27.8 24.0 - 34.0 PG    MCHC 32.8 31.0 - 37.0 g/dL    RDW 14.2 11.6 - 14.5 %    PLATELET 621 119 - 673 K/uL    MPV 9.7 9.2 - 11.8 FL    NEUTROPHILS 43 40 - 73 %    LYMPHOCYTES 41 21 - 52 %    MONOCYTES 13 (H) 3 - 10 %    EOSINOPHILS 3 0 - 5 %    BASOPHILS 0 0 - 2 %    ABS. NEUTROPHILS 1.0 (L) 1.8 - 8.0 K/UL    ABS. LYMPHOCYTES 1.0 0.9 - 3.6 K/UL    ABS. MONOCYTES 0.3 0.05 - 1.2 K/UL    ABS. EOSINOPHILS 0.1 0.0 - 0.4 K/UL    ABS.  BASOPHILS 0.0 0.0 - 0.1 K/UL    DF AUTOMATED     METABOLIC PANEL, COMPREHENSIVE    Collection Time: 12/27/19 12:06 PM   Result Value Ref Range    Sodium 141 136 - 145 mmol/L    Potassium 3.8 3.5 - 5.5 mmol/L    Chloride 103 100 - 111 mmol/L    CO2 27 21 - 32 mmol/L    Anion gap 11 3.0 - 18 mmol/L    Glucose 97 74 - 99 mg/dL    BUN 11 7.0 - 18 MG/DL    Creatinine 0.80 0.6 - 1.3 MG/DL    BUN/Creatinine ratio 14 12 - 20      GFR est AA >60 >60 ml/min/1.73m2    GFR est non-AA >60 >60 ml/min/1.73m2    Calcium PENDING MG/DL    Bilirubin, total PENDING MG/DL    ALT (SGPT) PENDING U/L    AST (SGOT) PENDING U/L    Alk. phosphatase PENDING U/L    Protein, total PENDING g/dL    Albumin PENDING g/dL    Globulin PENDING g/dL    A-G Ratio PENDING         Radiologic Studies -   XR CHEST PORT   Final Result    IMPRESSION:      1. No acute cardiopulmonary process. No change. CT Results  (Last 48 hours)    None        CXR Results  (Last 48 hours)               12/27/19 1154  XR CHEST PORT Final result    Impression:   IMPRESSION:       1. No acute cardiopulmonary process. No change. Narrative:  HISTORY: Cough. EXAM: Chest.       TECHNIQUE: AP portable single view upright chest.        COMPARISON: 10/20/2017. FINDINGS: There is no pneumothorax, pneumonia or pleural effusions. Heart and   mediastinal structures are unchanged. Heart is enlarged. Moderate bilateral   hyperexpansion. No change. . Moderate diffuse mineralization. Osteoarthritis of   bilateral shoulder joints. Medical Decision Making   I am the first provider for this patient. I reviewed the vital signs, available nursing notes, past medical history, past surgical history, family history and social history. Vital Signs-Reviewed the patient's vital signs. Records Reviewed: Nursing Notes    Procedures:  Procedures    Provider Notes (Medical Decision Making): No definite infiltrate on her chest x-ray. Treat her symptoms and have her follow-up with her PCP. MED RECONCILIATION:  No current facility-administered medications for this encounter. Current Outpatient Medications   Medication Sig    benzonatate (TESSALON PERLES) 100 mg capsule Take 1 Cap by mouth three (3) times daily as needed for Cough for up to 7 days.  pseudoephedrine (SUDAFED) 60 mg tablet Take 1 Tab by mouth every six (6) hours as needed for Congestion for up to 5 days.  Camphor-Eucalyptus Oil-Menthol (VICKS VAPORUB) 4.8-1.2-2.6 % oint 1 Actuation(s) by Apply Externally route three (3) times daily as needed for Cough or Other (congestion).  camphor-eucalyptus-menthol (VICKS VAPOSTEAM) liqd 1 Actuation(s) by Does Not Apply route three (3) times daily as needed for Cough or Other (congestion).  Vaporizers (Bureaux A Partager WARM STEAM VAPORIZER) misc 1 Actuation(s) by Does Not Apply route three (3) times daily as needed for Cough or Other (congestion).  fluticasone propionate (FLONASE) 50 mcg/actuation nasal spray 2 Sprays by Both Nostrils route daily.  guaiFENesin-dextromethorphan (SANTANA-TUSSIN DM)  mg/5 mL liqd Take 10 mL by mouth every four (4) hours as needed for Cough for up to 7 days.  furosemide (LASIX) 20 mg tablet TAKE ONE TABLET BY MOUTH DAILY as needed FOR EDEMA    atorvastatin (LIPITOR) 40 mg tablet Take 1 Tab by mouth daily.  fluticasone propionate (FLONASE) 50 mcg/actuation nasal spray USE 2 SPRAYS IN BOTH NOSTRILS DAILY    celecoxib (CELEBREX) 200 mg capsule Take 1 Cap by mouth two (2) times daily as needed for Pain.  miscellaneous medical supply misc DISPENSE (1) BEDSIDE COMMODE. DX:M15.9,Z74.09,Z96.641,M62.81,Z91.81    VITAMIN D3 1,000 unit tablet     gabapentin (NEURONTIN) 300 mg capsule Take 300 mg by mouth three (3) times daily.  cyanocobalamin (VITAMIN B-12) 500 mcg tablet Take 500 mcg by mouth daily. Disposition:  home    DISCHARGE NOTE:   12:45 PM    Pt has been reexamined. Patient has no new complaints, changes, or physical findings. Care plan outlined and precautions discussed. Results of labs, CXR were reviewed with the patient. All medications were reviewed with the patient; will d/c home with see below. All of pt's questions and concerns were addressed. Patient was instructed and agrees to follow up with PCP, as well as to return to the ED upon further deterioration. Patient is ready to go home.     Follow-up Information Follow up With Specialties Details Why Contact Info    Misha Torres MD Family Practice Schedule an appointment as soon as possible for a visit in 1 day  [address] [place] [address] [phone] [facility] EMERGENCY DEPT Emergency Medicine  If symptoms worsen return immediately [address] [facility]  [phone]          Current Discharge Medication List      START taking these medications    Details   benzonatate (TESSALON PERLES) 100 mg capsule Take 1 Cap by mouth three (3) times daily as needed for Cough for up to 7 days. Qty: 30 Cap, Refills: 0      pseudoephedrine (SUDAFED) 60 mg tablet Take 1 Tab by mouth every six (6) hours as needed for Congestion for up to 5 days. Qty: 20 Tab, Refills: 1      Camphor-Eucalyptus Oil-Menthol (VICKS VAPORUB) 4.8-1.2-2.6 % oint 1 Actuation(s) by Apply Externally route three (3) times daily as needed for Cough or Other (congestion). Qty: 50 g, Refills: 0      camphor-eucalyptus-menthol (VICKS VAPOSTEAM) liqd 1 Actuation(s) by Does Not Apply route three (3) times daily as needed for Cough or Other (congestion). Qty: 1 Bottle, Refills: 0      Vaporizers (VICKS WARM STEAM VAPORIZER) misc 1 Actuation(s) by Does Not Apply route three (3) times daily as needed for Cough or Other (congestion). Qty: 1 Each, Refills: 0      !! fluticasone propionate (FLONASE) 50 mcg/actuation nasal spray 2 Sprays by Both Nostrils route daily. Qty: 1 Bottle, Refills: 0      guaiFENesin-dextromethorphan (SANTANA-TUSSIN DM)  mg/5 mL liqd Take 10 mL by mouth every four (4) hours as needed for Cough for up to 7 days. Qty: 1 Bottle, Refills: 0       !! - Potential duplicate medications found. Please discuss with provider.       CONTINUE these medications which have NOT CHANGED    Details   !! fluticasone propionate (FLONASE) 50 mcg/actuation nasal spray USE 2 SPRAYS IN BOTH NOSTRILS DAILY  Qty: 16 g, Refills: 12    Associated Diagnoses: Chronic seasonal allergic rhinitis       ! ! - Potential duplicate medications found. Please discuss with provider. Diagnosis     Clinical Impression:   1.  Acute upper respiratory infection

## 2019-12-31 NOTE — PROGRESS NOTES
These lab tests were apparently done which the patient went to the emergency room for chest congestion and the only significant abnormality is leukopenia. She should be followed up by Dr. Hannah De Leon, her family physician for this problem.  ES

## 2019-12-31 NOTE — PROGRESS NOTES
It does not appear as if I seen this lady in the office in a number of years and this is just a chest x-ray which was done in the ER when she came in for congestion. This should be followed up by her family physician Dr. Sivakumar Olvera.  ES

## 2020-01-10 ENCOUNTER — TELEPHONE (OUTPATIENT)
Dept: CARDIOLOGY CLINIC | Age: 85
End: 2020-01-10

## 2020-01-10 NOTE — TELEPHONE ENCOUNTER
----- Message from Shelbie Maynard DO sent at 12/31/2019  9:23 AM EST -----  It does not appear as if I seen this lady in the office in a number of years and this is just a chest x-ray which was done in the ER when she came in for congestion. This should be followed up by her family physician Dr. Alfredo Gunn.  ES

## 2020-01-10 NOTE — TELEPHONE ENCOUNTER
Patient has appointment for Monday 1/13/2020. Spoke with Mara at Dr. Malave [facility] office, patient made aware.

## 2020-01-10 NOTE — TELEPHONE ENCOUNTER
----- Message from Ingrid Diaz DO sent at 12/31/2019  9:24 AM EST -----  These lab tests were apparently done which the patient went to the emergency room for chest congestion and the only significant abnormality is leukopenia. She should be followed up by Dr. Nicolle Friedman, her family physician for this problem.  ES

## 2020-01-10 NOTE — TELEPHONE ENCOUNTER
Patient has appointment with Dr. Marcos Ferreira on Monday 1/13/2020. Spoke with Mara at Dr. Marcos Ferreira office, patient made aware.

## 2020-01-27 ENCOUNTER — OFFICE VISIT (OUTPATIENT)
Dept: FAMILY MEDICINE CLINIC | Age: 85
End: 2020-01-27

## 2020-01-27 VITALS
TEMPERATURE: 98 F | HEART RATE: 76 BPM | HEIGHT: 65 IN | DIASTOLIC BLOOD PRESSURE: 72 MMHG | BODY MASS INDEX: 37.82 KG/M2 | WEIGHT: 227 LBS | RESPIRATION RATE: 18 BRPM | SYSTOLIC BLOOD PRESSURE: 111 MMHG

## 2020-01-27 DIAGNOSIS — R89.9 ABNORMAL LABORATORY TEST: Primary | ICD-10-CM

## 2020-01-27 NOTE — PROGRESS NOTES
Chief Complaint   Patient presents with    Other     Dr. Mariella Meza stated that white blood count is low and to look at chest x-ray. HISTORY OF PRESENT ILLNESS  Nia Shabazz is a 82 y.o. female. HPI  Pt here for eval of low white count. She had labs at the ER as part of an eval for acute illness. Her cardiologist noted that the wbc was low and recommended f/u with pcp. Review of records reveal some low white counts at times in the past.        ROS  Review of Systems   Constitutional: Negative. HENT: Negative. Respiratory: Negative. Cardiovascular: Negative. All other systems reviewed and are negative. Physical Exam  Physical Exam   Nursing note and vitals reviewed. Constitutional: She is oriented to person, place, and time. She appears well-developed and well-nourished. HENT:   Head: Normocephalic and atraumatic. Right Ear: External ear normal.   Left Ear: External ear normal.   Nose: Nose normal.   Eyes: Conjunctivae and EOM are normal.   Neck: Normal range of motion. Neck supple. No JVD present. Carotid bruit is not present. No thyromegaly present. Cardiovascular: Normal rate, regular rhythm, normal heart sounds and intact distal pulses. Exam reveals no gallop and no friction rub. No murmur heard. Pulmonary/Chest: Effort normal and breath sounds normal. She has no wheezes. She has no rhonchi. She has no rales. Abdominal: Soft. Bowel sounds are normal.   Musculoskeletal: Normal range of motion. Neurological: She is alert and oriented to person, place, and time. Coordination normal.   Skin: Skin is warm and dry. Psychiatric: She has a normal mood and affect. Her behavior is normal. Judgment and thought content normal.     ASSESSMENT and PLAN  Diagnoses and all orders for this visit:    1. Abnormal laboratory test  -     CBC WITH AUTOMATED DIFF; Future  Long discussion with patient and son. I suspect her low white count is likely due to ethnicity.   However, I advised him that we would look into this thoroughly to ensure that there are not any other issues that need to be evaluated. We will start with rechecking the white count. They understand and agree with this plan of treatment. We will contact him when results are available. Follow-up and Dispositions    · Return if symptoms worsen or fail to improve.      Will follow-up as indicated

## 2020-01-27 NOTE — PROGRESS NOTES
Pan Cadet presents today for   Chief Complaint   Patient presents with    Other     Dr. Mai Stephen stated that white blood count is low and to look at chest x-ray. Is someone accompanying this pt? Yes son    Is the patient using any DME equipment during OV? Yes walker    Depression Screening:  3 most recent PHQ Screens 1/27/2020   Little interest or pleasure in doing things Not at all   Feeling down, depressed, irritable, or hopeless Not at all   Total Score PHQ 2 0       Learning Assessment:  Learning Assessment 1/27/2020   PRIMARY LEARNER Patient   HIGHEST LEVEL OF EDUCATION - PRIMARY LEARNER  DID NOT GRADUATE [facility] PRIMARY LEARNER NONE   CO-LEARNER CAREGIVER No   PRIMARY LANGUAGE ENGLISH    NEED -   LEARNER PREFERENCE PRIMARY LISTENING   ANSWERED BY self   RELATIONSHIP SELF       Abuse Screening:  Abuse Screening Questionnaire 1/27/2020   Do you ever feel afraid of your partner? N   Are you in a relationship with someone who physically or mentally threatens you? N   Is it safe for you to go home? Y       Fall Screening  Fall Risk Assessment, last 12 mths 1/27/2020   Able to walk? Yes   Fall in past 12 months? No   Fall with injury? -   Number of falls in past 12 months -   Fall Risk Score -       Generalized Anxiety  No flowsheet data found. Health Maintenance Due   Topic Date Due    Shingrix Vaccine Age 49> (1 of 2) 08/30/1987   . Health Maintenance reviewed and discussed and ordered per Provider. Pan Cadet is updated on all     Coordination of Care  1. Have you been to the ER, urgent care clinic since your last visit? Hospitalized since your last visit? Yes 12/27/19 for congestion, weakness, and loose stool. 2. Have you seen or consulted any other health care providers outside of the [address] [place] [address] since your last visit? Include any pap smears or colon screening. no      Advance Directive:  1. Do you have an advance directive in place?  Patient Reply:yes  2. Per patient no changes to their ACP contact no.

## 2020-01-28 LAB
BASOPHILS # BLD AUTO: 0 X10E3/UL (ref 0–0.2)
BASOPHILS NFR BLD AUTO: 0 %
EOSINOPHIL # BLD AUTO: 0.1 X10E3/UL (ref 0–0.4)
EOSINOPHIL NFR BLD AUTO: 4 %
ERYTHROCYTE [DISTWIDTH] IN BLOOD BY AUTOMATED COUNT: 14.3 % (ref 11.7–15.4)
HCT VFR BLD AUTO: 38.6 % (ref 34–46.6)
HGB BLD-MCNC: 13.2 G/DL (ref 11.1–15.9)
IMM GRANULOCYTES # BLD AUTO: 0 X10E3/UL (ref 0–0.1)
IMM GRANULOCYTES NFR BLD AUTO: 0 %
LYMPHOCYTES # BLD AUTO: 1.3 X10E3/UL (ref 0.7–3.1)
LYMPHOCYTES NFR BLD AUTO: 37 %
MCH RBC QN AUTO: 28.4 PG (ref 26.6–33)
MCHC RBC AUTO-ENTMCNC: 34.2 G/DL (ref 31.5–35.7)
MCV RBC AUTO: 83 FL (ref 79–97)
MONOCYTES # BLD AUTO: 0.3 X10E3/UL (ref 0.1–0.9)
MONOCYTES NFR BLD AUTO: 8 %
NEUTROPHILS # BLD AUTO: 1.8 X10E3/UL (ref 1.4–7)
NEUTROPHILS NFR BLD AUTO: 51 %
PLATELET # BLD AUTO: 219 X10E3/UL (ref 150–450)
RBC # BLD AUTO: 4.65 X10E6/UL (ref 3.77–5.28)
WBC # BLD AUTO: 3.6 X10E3/UL (ref 3.4–10.8)

## 2020-01-30 NOTE — PROGRESS NOTES
Please call patient and notify her of normal results. I do not think we need any further evaluation at this time. However, we can recheck the white count when it is time to check her cholesterol again.

## 2020-01-31 NOTE — PROGRESS NOTES
Called patient and chart review was done. Patient was advised per- Dr. Jatin Rincon that her labs are normal and no further evaluation at this time, however she can recheck the white count when it time to check her cholesterol. Patient stated that is great and will do.

## 2020-03-11 ENCOUNTER — OFFICE VISIT (OUTPATIENT)
Dept: FAMILY MEDICINE CLINIC | Age: 85
End: 2020-03-11

## 2020-03-11 VITALS
DIASTOLIC BLOOD PRESSURE: 70 MMHG | RESPIRATION RATE: 18 BRPM | SYSTOLIC BLOOD PRESSURE: 125 MMHG | HEART RATE: 57 BPM | HEIGHT: 65 IN | WEIGHT: 230.4 LBS | BODY MASS INDEX: 38.39 KG/M2 | TEMPERATURE: 97.5 F

## 2020-03-11 DIAGNOSIS — E78.00 HYPERCHOLESTEROLEMIA: Primary | ICD-10-CM

## 2020-03-11 DIAGNOSIS — M15.9 PRIMARY OSTEOARTHRITIS INVOLVING MULTIPLE JOINTS: ICD-10-CM

## 2020-03-11 NOTE — PROGRESS NOTES
Evan Mitchell presents today for   Chief Complaint   Patient presents with    Cholesterol Problem     follow up    Arthritis       Is someone accompanying this pt? Yes son    Is the patient using any DME equipment during OV? Yes walker    Depression Screening:  3 most recent PHQ Screens 1/27/2020   Little interest or pleasure in doing things Not at all   Feeling down, depressed, irritable, or hopeless Not at all   Total Score PHQ 2 0       Learning Assessment:  Learning Assessment 1/27/2020   PRIMARY LEARNER Patient   HIGHEST LEVEL OF EDUCATION - PRIMARY LEARNER  DID NOT GRADUATE [facility] PRIMARY LEARNER NONE   CO-LEARNER CAREGIVER No   PRIMARY LANGUAGE ENGLISH    NEED -   LEARNER PREFERENCE PRIMARY LISTENING   ANSWERED BY self   RELATIONSHIP SELF       Abuse Screening:  Abuse Screening Questionnaire 1/27/2020   Do you ever feel afraid of your partner? N   Are you in a relationship with someone who physically or mentally threatens you? N   Is it safe for you to go home? Y       Fall Screening  Fall Risk Assessment, last 12 mths 1/27/2020   Able to walk? Yes   Fall in past 12 months? No   Fall with injury? -   Number of falls in past 12 months -   Fall Risk Score -       Generalized Anxiety  No flowsheet data found. Health Maintenance Due   Topic Date Due    DTaP/Tdap/Td series (1 - Tdap) 08/30/1958    Shingrix Vaccine Age 50> (1 of 2) 08/30/1987   . Health Maintenance reviewed and discussed and ordered per Provider. Evan Mitchell is updated on all     Coordination of Care  1. Have you been to the ER, urgent care clinic since your last visit? Hospitalized since your last visit? no    2. Have you seen or consulted any other health care providers outside of the [address] [place] [address] since your last visit? Include any pap smears or colon screening. no      Advance Directive:  1. Do you have an advance directive in place? Patient Reply:yes  2.   Per patient no changes to their ACP contact no.

## 2020-03-11 NOTE — PROGRESS NOTES
Chief Complaint   Patient presents with    Cholesterol Problem     follow up    Arthritis         HPI    Derik Pulido is a 82 y.o. female presenting today for 3 months  follow up of hld. Pt cont to have R shoulder pain. She is not interested in having a visit with ortho for this. Pt has not yet seen derm for her back itching. She does plan to sched an appt. She also has a large lesion on the R post shoulder. Pt will have her labs drawn today. Patient does not need medication refills today. New concerns today: none      Review of Systems   Constitutional: Negative. HENT: Negative. Respiratory: Negative. Cardiovascular: Negative. Musculoskeletal: Positive for joint pain. All other systems reviewed and are negative. Physical Exam  Physical Exam   Nursing note and vitals reviewed. Constitutional: She is oriented to person, place, and time. She appears well-developed and well-nourished. HENT:   Head: Normocephalic and atraumatic. Right Ear: External ear normal.   Left Ear: External ear normal.   Nose: Nose normal.   Eyes: Conjunctivae and EOM are normal.   Neck: Normal range of motion. Neck supple. No JVD present. Carotid bruit is not present. No thyromegaly present. Cardiovascular: Normal rate, regular rhythm, normal heart sounds and intact distal pulses. Exam reveals no gallop and no friction rub. No murmur heard. Pulmonary/Chest: Effort normal and breath sounds normal. She has no wheezes. She has no rhonchi. She has no rales. Abdominal: Soft. Bowel sounds are normal.   Musculoskeletal: Normal range of motion. Neurological: She is alert and oriented to person, place, and time. Coordination normal.   Skin: Skin is warm and dry. Psychiatric: She has a normal mood and affect. Her behavior is normal. Judgment and thought content normal.     Diagnoses and all orders for this visit:    1. Hypercholesterolemia  Labs today    2.  Primary osteoarthritis involving multiple joints  Stable, cont pres tx plan. Follow-up and Dispositions    · Return in about 3 months (around 6/11/2020) for elevated fasting blood sugar, arthritis.

## 2020-03-12 LAB
ALBUMIN SERPL-MCNC: 3.8 G/DL (ref 3.6–4.6)
ALBUMIN/GLOB SERPL: 1.4 {RATIO} (ref 1.2–2.2)
ALP SERPL-CCNC: 117 IU/L (ref 39–117)
ALT SERPL-CCNC: 18 IU/L (ref 0–32)
AST SERPL-CCNC: 18 IU/L (ref 0–40)
BILIRUB SERPL-MCNC: 0.5 MG/DL (ref 0–1.2)
BUN SERPL-MCNC: 14 MG/DL (ref 8–27)
BUN/CREAT SERPL: 19 (ref 12–28)
CALCIUM SERPL-MCNC: 9.3 MG/DL (ref 8.7–10.3)
CHLORIDE SERPL-SCNC: 106 MMOL/L (ref 96–106)
CHOLEST SERPL-MCNC: 202 MG/DL (ref 100–199)
CO2 SERPL-SCNC: 21 MMOL/L (ref 20–29)
CREAT SERPL-MCNC: 0.72 MG/DL (ref 0.57–1)
GLOBULIN SER CALC-MCNC: 2.7 G/DL (ref 1.5–4.5)
GLUCOSE SERPL-MCNC: 76 MG/DL (ref 65–99)
HDLC SERPL-MCNC: 58 MG/DL
INTERPRETATION, 910389: NORMAL
LDLC SERPL CALC-MCNC: 128 MG/DL (ref 0–99)
POTASSIUM SERPL-SCNC: 4.4 MMOL/L (ref 3.5–5.2)
PROT SERPL-MCNC: 6.5 G/DL (ref 6–8.5)
SODIUM SERPL-SCNC: 144 MMOL/L (ref 134–144)
SPECIMEN STATUS REPORT, ROLRST: NORMAL
TRIGL SERPL-MCNC: 79 MG/DL (ref 0–149)
VLDLC SERPL CALC-MCNC: 16 MG/DL (ref 5–40)

## 2020-03-19 ENCOUNTER — HOSPITAL ENCOUNTER (EMERGENCY)
Age: 85
Discharge: HOME | End: 2020-03-20
Attending: EMERGENCY MEDICINE
Payer: MEDICAID

## 2020-03-19 VITALS
DIASTOLIC BLOOD PRESSURE: 68 MMHG | SYSTOLIC BLOOD PRESSURE: 118 MMHG | HEART RATE: 80 BPM | TEMPERATURE: 98 F | RESPIRATION RATE: 16 BRPM | OXYGEN SATURATION: 100 %

## 2020-03-19 DIAGNOSIS — S09.90XA TRAUMATIC INJURY OF HEAD, INITIAL ENCOUNTER: Primary | ICD-10-CM

## 2020-03-19 DIAGNOSIS — S70.02XA CONTUSION OF LEFT HIP, INITIAL ENCOUNTER: ICD-10-CM

## 2020-03-19 PROCEDURE — 99283 EMERGENCY DEPT VISIT LOW MDM: CPT

## 2020-03-19 PROCEDURE — 96372 THER/PROPH/DIAG INJ SC/IM: CPT

## 2020-03-20 ENCOUNTER — APPOINTMENT (OUTPATIENT)
Dept: GENERAL RADIOLOGY | Age: 85
End: 2020-03-20
Attending: EMERGENCY MEDICINE
Payer: MEDICAID

## 2020-03-20 ENCOUNTER — APPOINTMENT (OUTPATIENT)
Dept: CT IMAGING | Age: 85
End: 2020-03-20
Attending: EMERGENCY MEDICINE
Payer: MEDICAID

## 2020-03-20 PROCEDURE — 74011250636 HC RX REV CODE- 250/636: Performed by: EMERGENCY MEDICINE

## 2020-03-20 PROCEDURE — 70450 CT HEAD/BRAIN W/O DYE: CPT

## 2020-03-20 PROCEDURE — 73502 X-RAY EXAM HIP UNI 2-3 VIEWS: CPT

## 2020-03-20 PROCEDURE — 72125 CT NECK SPINE W/O DYE: CPT

## 2020-03-20 RX ORDER — IBUPROFEN 400 MG/1
400 TABLET ORAL
Status: DISCONTINUED | OUTPATIENT
Start: 2020-03-20 | End: 2020-03-20

## 2020-03-20 RX ORDER — KETOROLAC TROMETHAMINE 15 MG/ML
15 INJECTION, SOLUTION INTRAMUSCULAR; INTRAVENOUS
Status: COMPLETED | OUTPATIENT
Start: 2020-03-20 | End: 2020-03-20

## 2020-03-20 RX ADMIN — KETOROLAC TROMETHAMINE 15 MG: 15 INJECTION, SOLUTION INTRAMUSCULAR; INTRAVENOUS at 01:29

## 2020-03-20 NOTE — ED NOTES
I have reviewed discharge instructions with the patient and caregiver. The patient and caregiver verbalized understanding. Medication teaching given, to include name, dose, action, and side effects. Patient verbalized understanding of medications. Encouraged patient to voice any concerns with reassurance provided. Patient armband removed and shredded    Patient Discharged in stable condition.

## 2020-03-20 NOTE — DISCHARGE INSTRUCTIONS
Patient Education        Learning About a Closed Head Injury  What is a closed head injury? A closed head injury happens when your head gets hit hard. The strong force of the blow causes your brain to shake in your skull. This movement can cause the brain to bruise, swell, or tear. Sometimes nerves or blood vessels also get damaged. This can cause bleeding in or around the brain. A concussion is a type of closed head injury. What are the symptoms? If you have a mild concussion, you may have a mild headache or feel \"not quite right. \" These symptoms are common. They usually go away over a few days to 4 weeks. But sometimes after a concussion, you feel like you can't function as well as before the injury. And you have new symptoms. This is called postconcussive syndrome. You may:  · Find it harder to solve problems, think, concentrate, or remember. · Have headaches. · Have changes in your sleep patterns, such as not being able to sleep or sleeping all the time. · Have changes in your personality. · Not be interested in your usual activities. · Feel angry or anxious without a clear reason. · Lose your sense of taste or smell. · Be dizzy, lightheaded, or unsteady. It may be hard to stand or walk. How is a closed head injury treated? Any person who may have a concussion needs to see a doctor. Some people have to stay in the hospital to be watched. Others can go home safely. If you go home, follow your doctor's instructions. He or she will tell you if you need someone to watch you closely for the next 24 hours or longer. Rest is the best treatment. Get plenty of sleep at night. And try to rest during the day. · Avoid activities that are physically or mentally demanding. These include housework, exercise, and schoolwork. And don't play video games, send text messages, or use the computer. You may need to change your school or work schedule to be able to avoid these activities.   · Ask your doctor when it's okay to drive, ride a bike, or operate machinery. · Take an over-the-counter pain medicine, such as acetaminophen (Tylenol), ibuprofen (Advil, Motrin), or naproxen (Aleve). Be safe with medicines. Read and follow all instructions on the label. · Check with your doctor before you use any other medicines for pain. · Do not drink alcohol or use illegal drugs. They can slow recovery. They can also increase your risk of getting a second head injury. Follow-up care is a key part of your treatment and safety. Be sure to make and go to all appointments, and call your doctor if you are having problems. It's also a good idea to know your test results and keep a list of the medicines you take. Where can you learn more? Go to http://nita-kady.info/  Enter E235 in the search box to learn more about \"Learning About a Closed Head Injury. \"  Current as of: November 19, 2019Content Version: 12.4  © 4560-6242 [employer]. Care instructions adapted under license by [employer] (which disclaims liability or warranty for this information). If you have questions about a medical condition or this instruction, always ask your healthcare professional. [address] any warranty or liability for your use of this information.

## 2020-03-20 NOTE — ED PROVIDER NOTES
HPI Patient was brought to ED by medic from home. She was sitting on the side of her head and fell aleep putting on her brief. She slid off the bed and hit her head. Patient with complaints of having a dull headache, right shoulder pain, and left hip pain. She denies being on blood thinners. Past Medical History:   Diagnosis Date    Decreased calculated GFR 12/5/2014    High vitamin D level 12/6/2014    Vitamin D 25-Hydroxy (12/06/2014) = 138.9     History of echocardiogram 08/29/2014    EF 60%. No WMA. Mild conc LVH. Gr 1 DDfx. Mild RVE. Mild CATRACHO. Sm right & sm left pleural effusion.  Hypercholesterolemia     Lower extremity venous duplex 10/02/2014    No DVT bilaterally.     Numbness in both hands     Osteoarthritis     Osteoarthritis of right hip     Osteoporosis     Peripheral neuropathy     Peripheral vascular disease (HCC)     Postoperative anemia due to acute blood loss     Vaginal fibroids     resolved s/p hyst    Varicose veins        Past Surgical History:   Procedure Laterality Date    HX CHOLECYSTECTOMY      HX HERNIA REPAIR  2010    abdominal, had 2nd surgery for infection    HX HIP FRACTURE TX Left     HX HIP REPLACEMENT Right 12/02/2014    S/P Right total hip replacement (12/02/2014 - Dr. Stephie Gomez)   [address] HX OTHER SURGICAL  03/07/2018    Left foot toe surgery-DANA VARNER @ 1 foot 2 foot    TOTAL KNEE ARTHROPLASTY  2006    left    TOTAL KNEE ARTHROPLASTY  2001    right    VASCULAR SURGERY PROCEDURE UNLIST      Bilateral leg vein stripping         Family History:   Problem Relation Age of Onset   [facility] Arthritis-rheumatoid Mother     Heart Attack Mother    [facility] Arthritis-rheumatoid Father     Other Father         gangrene   [facility] Arthritis-osteo Sister     Other Brother         pna    Arthritis-osteo Brother     Cancer Daughter         in remission    Arthritis-osteo Brother     Diabetes Brother     Arthritis-osteo Sister        Social History Socioeconomic History    Marital status:      Spouse name: Not on file    Number of children: Not on file    Years of education: Not on file    Highest education level: Not on file   Occupational History    Not on file   Social Needs    Financial resource strain: Not on file    Food insecurity     Worry: Not on file     Inability: Not on file    Transportation needs     Medical: Not on file     Non-medical: Not on file   Tobacco Use    Smoking status: Former Smoker     Years: 1.00     Types: Cigarettes     Last attempt to quit: 1982     Years since quittin.2    Smokeless tobacco: Never Used   Substance and Sexual Activity    Alcohol use: No     Comment: Quit alcohol in     Drug use: Yes     Types: Prescription, OTC     Comment: prescribed    Sexual activity: Never   Lifestyle    Physical activity     Days per week: Not on file     Minutes per session: Not on file    Stress: Not on file   Relationships    Social connections     Talks on phone: Not on file     Gets together: Not on file     Attends Bahai service: Not on file     Active member of club or organization: Not on file     Attends meetings of clubs or organizations: Not on file     Relationship status: Not on file    Intimate partner violence     Fear of current or ex partner: Not on file     Emotionally abused: Not on file     Physically abused: Not on file     Forced sexual activity: Not on file   Other Topics Concern    Not on file   Social History Narrative    Not on file         ALLERGIES: Patient has no known allergies. Review of Systems   Constitutional: Negative. HENT: Negative. Eyes: Negative. Respiratory: Negative. Cardiovascular: Negative. Gastrointestinal: Negative. Endocrine: Negative. Genitourinary: Negative. Musculoskeletal: Negative. Skin: Negative. Allergic/Immunologic: Negative. Neurological: Negative. Hematological: Negative.     Psychiatric/Behavioral: Negative. All other systems reviewed and are negative. Vitals:    03/19/20 2332   BP: 118/68   Pulse: 80   Resp: 16   Temp: 98 °F (36.7 °C)   SpO2: 100%            Physical Exam  Vitals signs and nursing note reviewed. Constitutional:       General: She is not in acute distress. Appearance: She is well-developed. She is not diaphoretic. HENT:      Head: Normocephalic. Comments: (+) abrasion and minimal tenderness over mid frontal area     Right Ear: External ear normal.      Left Ear: External ear normal.      Mouth/Throat:      Pharynx: No oropharyngeal exudate. Eyes:      General: No scleral icterus. Right eye: No discharge. Left eye: No discharge. Conjunctiva/sclera: Conjunctivae normal.      Pupils: Pupils are equal, round, and reactive to light. Neck:      Musculoskeletal: Normal range of motion and neck supple. Thyroid: No thyromegaly. Vascular: No JVD. Trachea: No tracheal deviation. Cardiovascular:      Rate and Rhythm: Normal rate and regular rhythm. Heart sounds: Normal heart sounds. No murmur. No friction rub. No gallop. Pulmonary:      Effort: Pulmonary effort is normal. No respiratory distress. Breath sounds: Normal breath sounds. No stridor. No wheezing or rales. Chest:      Chest wall: No tenderness. Abdominal:      General: Bowel sounds are normal. There is no distension. Palpations: Abdomen is soft. There is no mass. Tenderness: There is no abdominal tenderness. There is no guarding or rebound. Musculoskeletal: Normal range of motion. General: No tenderness. Comments: Left hip: (+) moderate pain with ROM, normal pulses and sensor   Lymphadenopathy:      Cervical: No cervical adenopathy. Skin:     General: Skin is warm and dry. Coloration: Skin is not pale. Findings: No erythema or rash. Neurological:      Mental Status: She is alert and oriented to person, place, and time. Cranial Nerves: No cranial nerve deficit. Motor: No abnormal muscle tone. Coordination: Coordination normal.      Deep Tendon Reflexes: Reflexes normal.          MDM  Number of Diagnoses or Management Options  Contusion of left hip, initial encounter: new and requires workup  Traumatic injury of head, initial encounter: new and requires workup     Amount and/or Complexity of Data Reviewed  Tests in the radiology section of CPT®: ordered and reviewed    Risk of Complications, Morbidity, and/or Mortality  Presenting problems: high  Diagnostic procedures: high  Management options: moderate           Procedures    Dx: contusion of head, contusion of left hip    Disp: D/C  Home. F/U PCP in 1 day. Return to ER prn. Dictation disclaimer:  Please note that this dictation was completed with [employer], the computer voice recognition software. Quite often unanticipated grammatical, syntax, homophones, and other interpretive errors are inadvertently transcribed by the computer software. Please disregard these errors. Please excuse any errors that have escaped final proofreading.

## 2020-03-20 NOTE — ED TRIAGE NOTES
Patient brought to ED by medic from home after falling to sleep putting on her brief. Patient states she slid off the bed and hit head. Patient with complaints of head pain, right shoulder pain, and left hip pain.

## 2020-03-23 NOTE — PROGRESS NOTES
I have not seen this lady in years and completed due to an ER visit and should be followed up by Dr. Yannick Johnson.  ES

## 2020-03-23 NOTE — PROGRESS NOTES
I have not seen this lady in years and completed due to an ER visit and should be followed up by Dr. Vianca Spencer.  ES

## 2020-03-23 NOTE — PROGRESS NOTES
I have not seen this lady in years and completed due to an ER visit and should be followed up by Dr. Jaime Johnson.  ES

## 2020-04-02 ENCOUNTER — TELEPHONE (OUTPATIENT)
Dept: FAMILY MEDICINE CLINIC | Age: 85
End: 2020-04-02

## 2020-04-02 NOTE — TELEPHONE ENCOUNTER
Caregiver called and stated that patient fell on 3/19/2020 and was seen at hospital, Caregiver stated that patient hurt his head hip and finger on left hand pretty bad.  Caregiver would like to know if xray can be ordered for patients left hand pinky finger, Caregiver are concerned that finger could be broken

## 2020-04-03 NOTE — TELEPHONE ENCOUNTER
Spoke with pt's son. He is concerned that pt may have broken her L 5th finger. She has pain at the mcp joint. He has tried splinting it but that is not helpful due to the location of the pain. Advised to take pt to an urgent care for imaging and appropriate casting or splinting as needed. Pt's son understands and agrees with tx plan.

## 2020-04-13 ENCOUNTER — OFFICE VISIT (OUTPATIENT)
Dept: ORTHOPEDIC SURGERY | Age: 85
End: 2020-04-13

## 2020-04-13 DIAGNOSIS — S62.647A CLOSED NONDISPLACED FRACTURE OF PROXIMAL PHALANX OF LEFT LITTLE FINGER, INITIAL ENCOUNTER: Primary | ICD-10-CM

## 2020-04-13 DIAGNOSIS — Z91.81 RISK FOR FALLS: ICD-10-CM

## 2020-04-13 DIAGNOSIS — M81.0 OSTEOPOROSIS, UNSPECIFIED OSTEOPOROSIS TYPE, UNSPECIFIED PATHOLOGICAL FRACTURE PRESENCE: ICD-10-CM

## 2020-04-13 NOTE — PROGRESS NOTES
Patient: Koki Ramires                MRN: [id number]       SSN: [id number]  YOB: 1935        AGE: 82 y.o. SEX: female    PCP: Gibran Hernandez MD  04/13/20    Chief Complaint   Patient presents with    Hand Pain     left 5th finger pain     HISTORY:  Koki Ramires is a 82 y.o. female who sustained a left 5th finger injury on 3/14/19. She fell off her bed while trying to pull up her pants. She toppled forward off of the bed and hit her left hand on the floor. She was seen at [facility] on the same day where left hand x rays revealed proximal phalanx fracture left little finger. A splint was applied and she was referred for orthopedic consultation. She has been experiencing proximal left 5th finger pain and swelling since the injury. She has a h/o osteoporosis. Pain Assessment  4/13/2020   Location of Pain Finger   Location Modifiers Left   Severity of Pain 0   Quality of Pain -   Duration of Pain -   Frequency of Pain -   Date Pain First Started 3/19/2020   Aggravating Factors -   Limiting Behavior -   Relieving Factors -   Relieving Factors Comment -   Result of Injury Yes   Work-Related Injury No   Type of Injury Fall     Occupation, etc:  Ms. Teresa Emanuel previously worked as a . She lives in [place] with her son and younger brother. She is fairyl independent despite her disabilities. She has 2 other sons, 2 daughters and 9 grandchildren. Ms. Teresa Emanuel weighs 230 lbs and is 5'5\" tall.        Lab Results   Component Value Date/Time    Hemoglobin A1c 5.8 11/18/2014 04:22 PM     Weight Metrics 3/11/2020 1/27/2020 12/27/2019 12/11/2019 9/9/2019 5/7/2019 2/4/2019   Weight 230 lb 6.4 oz 227 lb 230 lb 229 lb 9.6 oz 229 lb 12.8 oz 225 lb 12.8 oz 223 lb 9.6 oz   BMI 38.34 kg/m2 37.77 kg/m2 38.27 kg/m2 40.03 kg/m2 40.07 kg/m2 39.37 kg/m2 38.99 kg/m2       Patient Active Problem List   Diagnosis Code    High vitamin D level E67.3    Dyslipidemia E78.5    Varicose veins I83.90    Osteoarthritis M19.90    Osteoporosis M81.0    Status post right hip replacement Z96.641    Impaired mobility and ADLs Z74.09    Peripheral neuropathy G62.9    Peripheral vascular disease (HCC) I73.9    Postoperative anemia due to acute blood loss D62    Osteoarthritis of right hip M16.11    Decreased calculated GFR R94.4    History of kidney stones Z87.442    Numbness in both hands R20.0    Small bowel obstruction (HCC) K56.609    Hypercholesterolemia E78.00    Fx intertrochanteric hip (HCC) S72.143A    Muscle weakness (generalized) M62.81    Difficulty in walking, not elsewhere classified R26.2    ACP (advance care planning) Z71.89    Severe obesity (BMI 35.0-39. 9) E66.01    Age-related cataract of both eyes H25.9     REVIEW OF SYSTEMS:    Constitutional Symptoms: Negative   Eyes: Negative   Ears, Nose, Throat and Mouth: Negative   Cardiovascular: Negative   Respiratory: Negative   Genitourinary: Per HPI   Gastrointestinal: Per HPI   Integumentary (Skin and/or Breast): Negative   Musculoskeletal: Per HPI   Endocrine/Rheumatologic: Negative   Neurological: Per HPI   Hematology/Lymphatic: Negative    Allergic/Immunologic: Negative   Phychiatric: Negative    Social History     Socioeconomic History    Marital status:      Spouse name: Not on file    Number of children: Not on file    Years of education: Not on file    Highest education level: Not on file   Occupational History    Not on file   Social Needs    Financial resource strain: Not on file    Food insecurity     Worry: Not on file     Inability: Not on file    Transportation needs     Medical: Not on file     Non-medical: Not on file   Tobacco Use    Smoking status: Former Smoker     Years: 1.00     Types: Cigarettes     Last attempt to quit: 1982     Years since quittin.3    Smokeless tobacco: Never Used   Substance and Sexual Activity    Alcohol use: No     Comment: Quit alcohol in     Drug use:  Yes Types: Prescription, OTC     Comment: prescribed    Sexual activity: Never   Lifestyle    Physical activity     Days per week: Not on file     Minutes per session: Not on file    Stress: Not on file   Relationships    Social connections     Talks on phone: Not on file     Gets together: Not on file     Attends Voodoo service: Not on file     Active member of club or organization: Not on file     Attends meetings of clubs or organizations: Not on file     Relationship status: Not on file    Intimate partner violence     Fear of current or ex partner: Not on file     Emotionally abused: Not on file     Physically abused: Not on file     Forced sexual activity: Not on file   Other Topics Concern    Not on file   Social History Narrative    Not on file      No Known Allergies   Current Outpatient Medications   Medication Sig    fluticasone propionate (FLONASE) 50 mcg/actuation nasal spray 2 Sprays by Both Nostrils route daily.  furosemide (LASIX) 20 mg tablet TAKE ONE TABLET BY MOUTH DAILY as needed FOR EDEMA    atorvastatin (LIPITOR) 40 mg tablet Take 1 Tab by mouth daily.  fluticasone propionate (FLONASE) 50 mcg/actuation nasal spray USE 2 SPRAYS IN BOTH NOSTRILS DAILY    celecoxib (CELEBREX) 200 mg capsule Take 1 Cap by mouth two (2) times daily as needed for Pain.  miscellaneous medical supply misc DISPENSE (1) BEDSIDE COMMODE. DX:M15.9,Z74.09,Z96.641,M62.81,Z91.81    VITAMIN D3 1,000 unit tablet     gabapentin (NEURONTIN) 300 mg capsule Take 300 mg by mouth three (3) times daily.  cyanocobalamin (VITAMIN B-12) 500 mcg tablet Take 500 mcg by mouth daily.  Camphor-Eucalyptus Oil-Menthol (VICKS VAPORUB) 4.8-1.2-2.6 % oint 1 Actuation(s) by Apply Externally route three (3) times daily as needed for Cough or Other (congestion).  camphor-eucalyptus-menthol (VICKS VAPOSTEAM) liqd 1 Actuation(s) by Does Not Apply route three (3) times daily as needed for Cough or Other (congestion).     Vaporizers (VICKS WARM STEAM VAPORIZER) misc 1 Actuation(s) by Does Not Apply route three (3) times daily as needed for Cough or Other (congestion). No current facility-administered medications for this visit. PHYSICAL EXAMINATION:  Visit Vitals  [facility] 12/31/1985      ORTHO EXAMINATION:  Examination Right Hand Left Hand   Skin Intact Intact   Deformity - -   Swelling - -   Tenderness - + proximal 5th finger phalanx   Finger flexion Full Full   Finger extension Full Full   Sensation Normal Normal   Capillary refill Normal Normal   Heberden's nodes - -   Dupuytren's - -   Enlargement of left 5th PIP joint  Using Rollator walker    RADIOGRAPHS:  XR LEFT HAND 3/14/19 VELOCITY  -I have independently reviewed these images during this office visit. -Dr. Kareem Holguin:  Three views - impacted proximal 5th phalanx fracture with 30 degree apex volar angulation at fracture site, no joint space narrowing, osteoporosis. IMPRESSION:      ICD-10-CM ICD-9-CM    1. Closed nondisplaced fracture of proximal phalanx of left little finger, initial encounter S62.647A 816.01    2. Osteoporosis, unspecified osteoporosis type, unspecified pathological fracture presence M81.0 733.00    3. Risk for falls Z91.81 V15.88      PLAN:  Buddy tape applied. ROM exercises outlined. There is no need for surgery at this time. She will follow up in 3 weeks with re x ray.      Scribed by Ed Man ([id number]) as dictated by Randal Anderson MD

## 2020-04-27 ENCOUNTER — OFFICE VISIT (OUTPATIENT)
Dept: ORTHOPEDIC SURGERY | Age: 85
End: 2020-04-27

## 2020-04-27 VITALS
TEMPERATURE: 96.4 F | HEART RATE: 72 BPM | SYSTOLIC BLOOD PRESSURE: 142 MMHG | HEIGHT: 65 IN | BODY MASS INDEX: 36.29 KG/M2 | WEIGHT: 217.8 LBS | DIASTOLIC BLOOD PRESSURE: 74 MMHG

## 2020-04-27 DIAGNOSIS — S62.647D CLOSED NONDISPLACED FRACTURE OF PROXIMAL PHALANX OF LEFT LITTLE FINGER WITH ROUTINE HEALING, SUBSEQUENT ENCOUNTER: Primary | ICD-10-CM

## 2020-04-27 DIAGNOSIS — M79.645 PAIN OF FINGER OF LEFT HAND: ICD-10-CM

## 2020-04-27 NOTE — PROGRESS NOTES
Patient: Shelton Ferrer                MRN: [id number]       SSN: [id number]  YOB: 1935        AGE: 82 y.o. SEX: female    Gina Brown MD   04/27/20    Chief Complaint   Patient presents with    Finger Pain     left 5th finger     HISTORY:  Shelton Ferrer is a 82 y.o. female who is seen for left 5th finger fracture follow up--doing well. She sustained a left 5th finger injury on 3/14/19. She fell off her bed while trying to pull up her pants. She toppled forward off of the bed and hit her left hand on the floor. She was seen at [facility] on the same day where left hand x rays revealed proximal phalanx fracture left little finger. A splint was applied and she was referred for orthopedic consultation. She has been experiencing proximal left 5th finger pain and swelling since the injury. She has a h/o osteoporosis. Pain Assessment  4/13/2020   Location of Pain Finger   Location Modifiers Left   Severity of Pain 0   Quality of Pain -   Duration of Pain -   Frequency of Pain -   Date Pain First Started 3/19/2020   Aggravating Factors -   Limiting Behavior -   Relieving Factors -   Relieving Factors Comment -   Result of Injury Yes   Work-Related Injury No   Type of Injury Fall     Occupation, etc:  Ms. Gayathri Albarado previously worked as a . She lives in [place] with her son and younger brother. She is fairyl independent despite her disabilities. She has 2 other sons, 2 daughters and 9 grandchildren. Ms. Gayathri Albarado weighs 230 lbs and is 5'5\" tall.          Weight Metrics 4/27/2020 3/11/2020 1/27/2020 12/27/2019 12/11/2019 9/9/2019 5/7/2019   Weight 217 lb 12.8 oz 230 lb 6.4 oz 227 lb 230 lb 229 lb 9.6 oz 229 lb 12.8 oz 225 lb 12.8 oz   BMI 36.24 kg/m2 38.34 kg/m2 37.77 kg/m2 38.27 kg/m2 40.03 kg/m2 40.07 kg/m2 39.37 kg/m2     Patient Active Problem List   Diagnosis Code    High vitamin D level E67.3    Dyslipidemia E78.5    Varicose veins I83.90    Osteoarthritis M19.90    Osteoporosis M81.0    Status post right hip replacement Z96.641    Impaired mobility and ADLs Z74.09    Peripheral neuropathy G62.9    Peripheral vascular disease (HCC) I73.9    Postoperative anemia due to acute blood loss D62    Osteoarthritis of right hip M16.11    Decreased calculated GFR R94.4    History of kidney stones Z87.442    Numbness in both hands R20.0    Small bowel obstruction (HCC) K56.609    Hypercholesterolemia E78.00    Fx intertrochanteric hip (HCC) S72.143A    Muscle weakness (generalized) M62.81    Difficulty in walking, not elsewhere classified R26.2    ACP (advance care planning) Z71.89    Severe obesity (BMI 35.0-39. 9) E66.01    Age-related cataract of both eyes H25.9     REVIEW OF SYSTEMS:    Constitutional Symptoms: Negative   Eyes: Negative   Ears, Nose, Throat and Mouth: Negative   Cardiovascular: Negative   Respiratory: Negative   Genitourinary: Per HPI   Gastrointestinal: Per HPI   Integumentary (Skin and/or Breast): Negative   Musculoskeletal: Per HPI   Endocrine/Rheumatologic: Negative   Neurological: Per HPI   Hematology/Lymphatic: Negative    Allergic/Immunologic: Negative   Phychiatric: Negative    Social History     Socioeconomic History    Marital status:      Spouse name: Not on file    Number of children: Not on file    Years of education: Not on file    Highest education level: Not on file   Occupational History    Not on file   Social Needs    Financial resource strain: Not on file    Food insecurity     Worry: Not on file     Inability: Not on file    Transportation needs     Medical: Not on file     Non-medical: Not on file   Tobacco Use    Smoking status: Former Smoker     Years: 1.00     Types: Cigarettes     Last attempt to quit: 1982     Years since quittin.3    Smokeless tobacco: Never Used   Substance and Sexual Activity    Alcohol use: No     Comment: Quit alcohol in     Drug use: Yes     Types: Prescription, OTC Comment: prescribed    Sexual activity: Never   Lifestyle    Physical activity     Days per week: Not on file     Minutes per session: Not on file    Stress: Not on file   Relationships    Social connections     Talks on phone: Not on file     Gets together: Not on file     Attends Holiness service: Not on file     Active member of club or organization: Not on file     Attends meetings of clubs or organizations: Not on file     Relationship status: Not on file    Intimate partner violence     Fear of current or ex partner: Not on file     Emotionally abused: Not on file     Physically abused: Not on file     Forced sexual activity: Not on file   Other Topics Concern    Not on file   Social History Narrative    Not on file       No Known Allergies    Current Outpatient Medications   Medication Sig    furosemide (LASIX) 20 mg tablet TAKE ONE TABLET BY MOUTH DAILY as needed FOR EDEMA    atorvastatin (LIPITOR) 40 mg tablet Take 1 Tab by mouth daily.  fluticasone propionate (FLONASE) 50 mcg/actuation nasal spray USE 2 SPRAYS IN BOTH NOSTRILS DAILY    celecoxib (CELEBREX) 200 mg capsule Take 1 Cap by mouth two (2) times daily as needed for Pain.  VITAMIN D3 1,000 unit tablet     gabapentin (NEURONTIN) 300 mg capsule Take 300 mg by mouth three (3) times daily.  cyanocobalamin (VITAMIN B-12) 500 mcg tablet Take 500 mcg by mouth daily.  Camphor-Eucalyptus Oil-Menthol (VICKS VAPORUB) 4.8-1.2-2.6 % oint 1 Actuation(s) by Apply Externally route three (3) times daily as needed for Cough or Other (congestion).  camphor-eucalyptus-menthol (VICKS VAPOSTEAM) liqd 1 Actuation(s) by Does Not Apply route three (3) times daily as needed for Cough or Other (congestion).  Vaporizers ([employer] Mach[employer] WARM STEAM VAPORIZER) misc 1 Actuation(s) by Does Not Apply route three (3) times daily as needed for Cough or Other (congestion).     fluticasone propionate (FLONASE) 50 mcg/actuation nasal spray 2 Sprays by Both Nostrils route daily.  miscellaneous medical supply misc DISPENSE (1) BEDSIDE COMMODE. DX:M15.9,Z74.09,Z96.641,M62.81,Z91.81     No current facility-administered medications for this visit. PHYSICAL EXAMINATION:  Visit Vitals  /74   Pulse 72   Temp 96.4 °F (35.8 °C)   Ht 5' 5\" (1.651 m)   Wt 217 lb 12.8 oz (98.8 kg)   LMP 12/31/1985   BMI 36.24 kg/m²       ORTHO EXAMINATION:  Examination Right Hand Left Hand   Skin Intact Intact   Deformity - -   Swelling - -   Tenderness - + mild 5th phalanx   Finger flexion Full Full   Finger extension Full Full   Sensation Normal Normal   Capillary refill Normal Normal   Heberden's nodes - -   Dupuytren's - -   Enlargement of left 5th PIP joint  Using Rollator walker    RADIOGRAPHS:  XR LEFT FINGERS 4/27/20 DANYEL  IMPRESSION:  Three views - impacted proximal 5th phalanx intraarticular fracture with minimal displacement and early callus formation, no degenerative changes, osteopenia    XR LEFT HAND 3/14/19 VELOCITY  -I have independently reviewed these images during this office visit. -Dr. Rafa Zacarias:  Three views - impacted proximal 5th phalanx fracture with 30 degree apex volar angulation at fracture site, no joint space narrowing, osteoporosis. IMPRESSION:      ICD-10-CM ICD-9-CM    1. Closed nondisplaced fracture of proximal phalanx of left little finger with routine healing, subsequent encounter S62.647D V54.19    2. Pain of finger of left hand M79.645 729.5 AMB POC XRAY, FINGER(S), 2+ VIEWS       PLAN:  Discontinue buddy tape. There is no need for surgery at this time. She will follow up as needed.     Scribed by Fernando Tabares ([address]) as dictated by Sofy Donato MD

## 2020-06-10 ENCOUNTER — VIRTUAL VISIT (OUTPATIENT)
Dept: FAMILY MEDICINE CLINIC | Age: 85
End: 2020-06-10

## 2020-06-10 NOTE — PROGRESS NOTES
Lilibeth Stock presents today for   Chief Complaint   Patient presents with    Blood sugar problem     elevated fasting blood glucose    Arthritis       Is someone accompanying this pt? no    Is the patient using any DME equipment during OV? no    Depression Screening:  3 most recent PHQ Screens 4/13/2020   Little interest or pleasure in doing things Not at all   Feeling down, depressed, irritable, or hopeless Not at all   Total Score PHQ 2 0       Learning Assessment:  Learning Assessment 1/27/2020   PRIMARY LEARNER Patient   HIGHEST LEVEL OF EDUCATION - PRIMARY LEARNER  DID NOT GRADUATE [facility] PRIMARY LEARNER NONE   CO-LEARNER CAREGIVER No   PRIMARY LANGUAGE ENGLISH    NEED -   LEARNER PREFERENCE PRIMARY LISTENING   ANSWERED BY self   RELATIONSHIP SELF       Abuse Screening:  Abuse Screening Questionnaire 1/27/2020   Do you ever feel afraid of your partner? N   Are you in a relationship with someone who physically or mentally threatens you? N   Is it safe for you to go home? Y       Fall Screening  Fall Risk Assessment, last 12 mths 4/13/2020   Able to walk? Yes   Fall in past 12 months? Yes   Fall with injury? Yes   Number of falls in past 12 months 1   Fall Risk Score 2       Generalized Anxiety  No flowsheet data found. Health Maintenance Due   Topic Date Due    Shingrix Vaccine Age 49> (1 of 2) 08/30/1987   . Health Maintenance reviewed and discussed and ordered per Provider. Coordination of Care  1. Have you been to the ER, urgent care clinic since your last visit? Hospitalized since your last visit? no    2. Have you seen or consulted any other health care providers outside of the [address] [place] [address] since your last visit? Include any pap smears or colon screening.  Yes, neurology      Advance Directive:  Discussed 1/27/20

## 2020-06-10 NOTE — PROGRESS NOTES
Sneha Reddy is a 82 y.o. female evaluated via audio only technology on 6/10/2020. Consent: She and/or her health care decision maker is aware that she may receive a bill for this audio only encounter, depending on her insurance coverage, and has provided verbal consent to proceed: {YES/NO/NA-Consent obtained within past 12 months:64193} I communicated with the patient and/or health care decision maker about the nature and details of the following: 
Assessment & Plan:  
{There are no diagnoses linked to this encounter. (Refresh or delete this SmartLink)} The complexity of medical decision making for this visit is {DESC; MODERATE/HIGH:78745} 12 Subjective: Sneha Reddy is a 82 y.o. female who was seen for Blood sugar problem (elevated fasting blood glucose) and Arthritis Prior to Admission medications Medication Sig Start Date End Date Taking? Authorizing Provider  
furosemide (LASIX) 20 mg tablet TAKE ONE TABLET BY MOUTH DAILY as needed FOR EDEMA 19  Yes Steve Ramirez MD  
atorvastatin (LIPITOR) 40 mg tablet Take 1 Tab by mouth daily. 19  Yes Angelica Naqvi MD  
fluticasone propionate (FLONASE) 50 mcg/actuation nasal spray USE 2 SPRAYS IN BOTH NOSTRILS DAILY 10/15/19  Yes Steve Ramirez MD  
celecoxib (CELEBREX) 200 mg capsule Take 1 Cap by mouth two (2) times daily as needed for Pain. 10/13/19  Yes Angelica Naqvi MD  
miscellaneous medical supply misc DISPENSE (1) BEDSIDE COMMODE. DX:M15.9,Z74.09,Z96.641,M62.81,Z91.81 17  Yes Angelica Naqvi MD  
VITAMIN D3 1,000 unit tablet  16  Yes Provider, Historical  
gabapentin (NEURONTIN) 300 mg capsule Take 300 mg by mouth three (3) times daily. Yes Provider, Historical  
cyanocobalamin (VITAMIN B-12) 500 mcg tablet Take 500 mcg by mouth daily. Yes Provider, Historical  
 
No Known Allergies Patient Active Problem List  
Diagnosis Code  High vitamin D level E67.3  Dyslipidemia E78.5  Varicose veins I83.90  
 Osteoarthritis M19.90  
 Osteoporosis M81.0  Status post right hip replacement Z96.641  Impaired mobility and ADLs Z74.09, Z78.9  Peripheral neuropathy G62.9  Peripheral vascular disease (HCC) I73.9  Postoperative anemia due to acute blood loss D62  
 Osteoarthritis of right hip M16.11  
 Decreased calculated GFR R94.4  History of kidney stones Z87.442  Numbness in both hands R20.0  Small bowel obstruction ([address] T84.087  Hypercholesterolemia E78.00  Fx intertrochanteric hip ([address] S14.159Q  Muscle weakness (generalized) M62.81  Difficulty in walking, not elsewhere classified R26.2  ACP (advance care planning) Z71.89  Severe obesity (BMI 35.0-39. 9) E66.01  
 Age-related cataract of both eyes H25.9 Current Outpatient Medications Medication Sig Dispense Refill  furosemide (LASIX) 20 mg tablet TAKE ONE TABLET BY MOUTH DAILY as needed FOR EDEMA 30 Tab 12  
 atorvastatin (LIPITOR) 40 mg tablet Take 1 Tab by mouth daily. 30 Tab 12  
 fluticasone propionate (FLONASE) 50 mcg/actuation nasal spray USE 2 SPRAYS IN BOTH NOSTRILS DAILY 16 g 12  
 celecoxib (CELEBREX) 200 mg capsule Take 1 Cap by mouth two (2) times daily as needed for Pain. 60 Cap 12  
 miscellaneous medical supply misc DISPENSE (1) BEDSIDE COMMODE. DX:M15.9,Z74.09,Z96.641,M62.81,Z91.81 1 Each 0  
 VITAMIN D3 1,000 unit tablet  gabapentin (NEURONTIN) 300 mg capsule Take 300 mg by mouth three (3) times daily.  cyanocobalamin (VITAMIN B-12) 500 mcg tablet Take 500 mcg by mouth daily. No Known Allergies Past Medical History:  
Diagnosis Date  Decreased calculated GFR 12/5/2014  High vitamin D level 12/6/2014 Vitamin D 25-Hydroxy (12/06/2014) = 138.9  History of echocardiogram 08/29/2014 EF 60%. No WMA. Mild conc LVH. Gr 1 DDfx. Mild RVE. Mild CATRACHO. Sm right & sm left pleural effusion.  Hypercholesterolemia  Lower extremity venous duplex 10/02/2014 No DVT bilaterally.  Numbness in both hands  Osteoarthritis  Osteoarthritis of right hip  Osteoporosis  Peripheral neuropathy  Peripheral vascular disease ([address]  Postoperative anemia due to acute blood loss  Vaginal fibroids   
 resolved s/p hyst  
 Varicose veins Past Surgical History:  
Procedure Laterality Date  HX CHOLECYSTECTOMY  HX HERNIA REPAIR  2010  
 abdominal, had 2nd surgery for infection  HX HIP FRACTURE TX Left  HX HIP REPLACEMENT Right 2014 S/P Right total hip replacement (2014 - Dr. Kimberlyn Barragan) [facility]  HX OTHER SURGICAL  2018 Left foot toe surgery-DANA VARNER @ 1 foot 2 foot  TOTAL KNEE ARTHROPLASTY    
 left  TOTAL KNEE ARTHROPLASTY  2001  
 right  VASCULAR SURGERY PROCEDURE UNLIST Bilateral leg vein stripping Family History Problem Relation Age of Onset  Arthritis-rheumatoid Mother  Heart Attack Mother  Arthritis-rheumatoid Father  Other Father   
     gangrene  Arthritis-osteo Sister  Other Brother   
     pna  Arthritis-osteo Brother  Cancer Daughter   
     in remission  Arthritis-osteo Brother  Diabetes Brother  Arthritis-osteo Sister Social History Tobacco Use  Smoking status: Former Smoker Years: 1.00 Types: Cigarettes Last attempt to quit: 1982 Years since quittin.4  Smokeless tobacco: Never Used Substance Use Topics  Alcohol use: No  
  Comment: Quit alcohol in  Review of Systems Constitutional: Negative. HENT: Negative. Respiratory: Negative. Cardiovascular: Negative. All other systems reviewed and are negative. I affirm this is a Patient-Initiated Episode with a Patient who has not had a related appointment within my department in the past 7 days or scheduled within the next 24 hours. Total Time: {minutes:25057::\"5-10 minutes\"} Note: not billable if this call serves to triage the patient into an appointment for the relevant concern Jed Lemus MD

## 2020-07-27 ENCOUNTER — OFFICE VISIT (OUTPATIENT)
Dept: ORTHOPEDIC SURGERY | Facility: CLINIC | Age: 85
End: 2020-07-27

## 2020-07-27 VITALS
HEIGHT: 65 IN | OXYGEN SATURATION: 99 % | SYSTOLIC BLOOD PRESSURE: 125 MMHG | RESPIRATION RATE: 12 BRPM | WEIGHT: 228 LBS | HEART RATE: 62 BPM | BODY MASS INDEX: 37.99 KG/M2 | DIASTOLIC BLOOD PRESSURE: 66 MMHG | TEMPERATURE: 97.1 F

## 2020-07-27 DIAGNOSIS — M25.512 CHRONIC LEFT SHOULDER PAIN: ICD-10-CM

## 2020-07-27 DIAGNOSIS — M25.412 SHOULDER EFFUSION, LEFT: ICD-10-CM

## 2020-07-27 DIAGNOSIS — M12.812 ROTATOR CUFF ARTHROPATHY OF BOTH SHOULDERS: ICD-10-CM

## 2020-07-27 DIAGNOSIS — M12.811 ROTATOR CUFF ARTHROPATHY OF BOTH SHOULDERS: ICD-10-CM

## 2020-07-27 DIAGNOSIS — M19.012 PRIMARY OSTEOARTHRITIS, LEFT SHOULDER: Primary | ICD-10-CM

## 2020-07-27 DIAGNOSIS — M81.0 OSTEOPOROSIS, UNSPECIFIED OSTEOPOROSIS TYPE, UNSPECIFIED PATHOLOGICAL FRACTURE PRESENCE: ICD-10-CM

## 2020-07-27 DIAGNOSIS — G89.29 CHRONIC LEFT SHOULDER PAIN: ICD-10-CM

## 2020-07-27 RX ORDER — BETAMETHASONE SODIUM PHOSPHATE AND BETAMETHASONE ACETATE 3; 3 MG/ML; MG/ML
6 INJECTION, SUSPENSION INTRA-ARTICULAR; INTRALESIONAL; INTRAMUSCULAR; SOFT TISSUE ONCE
Qty: 0.5 ML | Refills: 0
Start: 2020-07-27 | End: 2020-07-27

## 2020-07-27 NOTE — PROGRESS NOTES
Patient: Devendra Ott                MRN: [id number]       SSN: [id number]  YOB: 1935        AGE: 82 y.o. SEX: female    Chelly Purcell MD   07/27/20    Chief Complaint   Patient presents with    Shoulder Pain     Left   LEFT SHOULDER EFFUSION  HISTORY:  Devendra Ott is a 82 y.o. female who is seen for left shoulder pain and swelling. She has been experiencing left shoulder pain for the past several years. She does not recall any injury. She feels shoulder pain with overhead activities and at night. She has noticed increased fluctuant left shoulder swelling recently. She has a h/o of right shoulder arthritis. She was previously seen for left little finger fracture--doing well. She sustained a left 5th finger injury on 3/14/19. She fell off her bed while trying to pull up her pants. She toppled forward off of the bed and hit her left hand on the floor. She has a h/o osteoporosis. Pain Assessment  7/27/2020   Location of Pain Shoulder   Location Modifiers Left   Severity of Pain 10   Quality of Pain Throbbing   Duration of Pain -   Frequency of Pain Intermittent   Date Pain First Started -   Aggravating Factors -   Limiting Behavior No   Relieving Factors Other (Comment)   Relieving Factors Comment Bengay   Result of Injury No   Work-Related Injury -   Type of Injury -     Occupation, etc:  Ms. Jeremías Curran previously worked as a . She lives in [place] with her son and younger brother. She is fairyl independent despite her disabilities. She has 2 other sons, 2 daughters and 9 grandchildren. Ms. Jeremías Curran weighs 228 lbs and is 5'5\" tall.          Weight Metrics 7/27/2020 4/27/2020 3/11/2020 1/27/2020 12/27/2019 12/11/2019 9/9/2019   Weight 228 lb 217 lb 12.8 oz 230 lb 6.4 oz 227 lb 230 lb 229 lb 9.6 oz 229 lb 12.8 oz   BMI 37.94 kg/m2 36.24 kg/m2 38.34 kg/m2 37.77 kg/m2 38.27 kg/m2 40.03 kg/m2 40.07 kg/m2     Patient Active Problem List   Diagnosis Code    High vitamin D level E67.3    Dyslipidemia E78.5    Varicose veins I83.90    Osteoarthritis M19.90    Osteoporosis M81.0    Status post right hip replacement Z96.641    Impaired mobility and ADLs Z74.09, Z78.9    Peripheral neuropathy G62.9    Peripheral vascular disease (HCC) I73.9    Postoperative anemia due to acute blood loss D62    Osteoarthritis of right hip M16.11    Decreased calculated GFR R94.4    History of kidney stones Z87.442    Numbness in both hands R20.0    Small bowel obstruction (HCC) K56.609    Hypercholesterolemia E78.00    Fx intertrochanteric hip (HCC) S72.143A    Muscle weakness (generalized) M62.81    Difficulty in walking, not elsewhere classified R26.2    ACP (advance care planning) Z71.89    Severe obesity (BMI 35.0-39. 9) E66.01    Age-related cataract of both eyes H25.9     REVIEW OF SYSTEMS:    Constitutional Symptoms: Negative   Eyes: Negative   Ears, Nose, Throat and Mouth: Negative   Cardiovascular: Negative   Respiratory: Negative   Genitourinary: Per HPI   Gastrointestinal: Per HPI   Integumentary (Skin and/or Breast): Negative   Musculoskeletal: Per HPI   Endocrine/Rheumatologic: Negative   Neurological: Per HPI   Hematology/Lymphatic: Negative    Allergic/Immunologic: Negative   Phychiatric: Negative    Social History     Socioeconomic History    Marital status:      Spouse name: Not on file    Number of children: Not on file    Years of education: Not on file    Highest education level: Not on file   Occupational History    Not on file   Social Needs    Financial resource strain: Not on file    Food insecurity     Worry: Not on file     Inability: Not on file    Transportation needs     Medical: Not on file     Non-medical: Not on file   Tobacco Use    Smoking status: Former Smoker     Years: 1.00     Types: Cigarettes     Last attempt to quit: 1982     Years since quittin.5    Smokeless tobacco: Never Used   Substance and Sexual Activity    Alcohol use: No     Comment: Quit alcohol in 1982    Drug use: Yes     Types: Prescription, OTC     Comment: prescribed    Sexual activity: Never   Lifestyle    Physical activity     Days per week: Not on file     Minutes per session: Not on file    Stress: Not on file   Relationships    Social connections     Talks on phone: Not on file     Gets together: Not on file     Attends Anabaptism service: Not on file     Active member of club or organization: Not on file     Attends meetings of clubs or organizations: Not on file     Relationship status: Not on file    Intimate partner violence     Fear of current or ex partner: Not on file     Emotionally abused: Not on file     Physically abused: Not on file     Forced sexual activity: Not on file   Other Topics Concern    Not on file   Social History Narrative    Not on file       No Known Allergies    Current Outpatient Medications   Medication Sig    betamethasone (Celestone Soluspan) 6 mg/mL injection 1 mL by Intra artICUlar route once for 1 dose.  furosemide (LASIX) 20 mg tablet TAKE ONE TABLET BY MOUTH DAILY as needed FOR EDEMA    atorvastatin (LIPITOR) 40 mg tablet Take 1 Tab by mouth daily.  fluticasone propionate (FLONASE) 50 mcg/actuation nasal spray USE 2 SPRAYS IN BOTH NOSTRILS DAILY    celecoxib (CELEBREX) 200 mg capsule Take 1 Cap by mouth two (2) times daily as needed for Pain.  miscellaneous medical supply misc DISPENSE (1) BEDSIDE COMMODE. DX:M15.9,Z74.09,Z96.641,M62.81,Z91.81    VITAMIN D3 1,000 unit tablet     gabapentin (NEURONTIN) 300 mg capsule Take 300 mg by mouth three (3) times daily.  cyanocobalamin (VITAMIN B-12) 500 mcg tablet Take 500 mcg by mouth daily. No current facility-administered medications for this visit.         PHYSICAL EXAMINATION:  Visit Vitals  /66 (BP 1 Location: Left arm)   Pulse 62   Temp 97.1 °F (36.2 °C) (Temporal)   Resp 12   Ht 5' 5\" (1.651 m)   Wt 228 lb (103.4 kg)   LMP 12/31/1985 SpO2 99%   BMI 37.94 kg/m²       ORTHO EXAMINATION:  Examination Right shoulder Left shoulder   Skin Intact Intact   Effusion - ++   Biceps deformity - -   Atrophy - -   AC joint tenderness - -   Acromial tenderness - +   Biceps tenderness - -   Forward flexion/Elevation  170   Active abduction  160   External rotation ROM 30 30   Internal rotation ROM 90 90   Apprehension - -   Impingement - -   Drop Arm Test - -   Neurovascular Intact Intact      Examination Right Hand Left Hand   Skin Intact Intact   Deformity - -   Swelling - -   Tenderness - -   Finger flexion Full Full   Finger extension Full Full   Sensation Normal Normal   Capillary refill Normal Normal   Heberden's nodes - -   Dupuytren's - -   Enlargement of left 5th PIP joint  Using Rollator walker    TIME OUT:  Chart reviewed for the following:   Benjamin Roberts MD, have reviewed the History, Physical and updated the Allergic reactions for Liana Castillo   TIME OUT performed immediately prior to start of procedure:  Benjamin Roberts MD, have performed the following reviews on Liana Castillo prior to the start of the procedure:          * Patient was identified by name and date of birth   * Agreement on procedure being performed was verified  * Risks and Benefits explained to the patient  * Procedure site verified and marked as necessary  * Patient was positioned for comfort  * Consent was obtained     Time: 9:26 AM     Date of procedure: 7/27/2020  Procedure performed by:  Malinda Arita MD  Ms. Huang tolerated the procedure well with no complications.      RADIOGRAPHS:  XR LEFT SHOULDER 9/18/17 DANYEL  IMPRESSION:  Three views - No fractures, no acromioclavicular narrowing, severe glenohumeral narrowing, + calcific densities, rotator cuff arthropathy    XR LEFT FINGERS 4/27/20 DANYEL  IMPRESSION:  Three views - impacted proximal 5th phalanx intraarticular fracture with minimal displacement and early callus formation, no degenerative changes, osteopenia    XR LEFT HAND 3/14/19 VELOCITY  -I have independently reviewed these images during this office visit. -Dr. Sarah Tejeda:  Three views - impacted proximal 5th phalanx fracture with 30 degree apex volar angulation at fracture site, no joint space narrowing, osteoporosis. IMPRESSION:      ICD-10-CM ICD-9-CM    1. Primary osteoarthritis, left shoulder  M19.012 715.11 betamethasone (Celestone Soluspan) 6 mg/mL injection      BETAMETHASONE ACETATE & SODIUM PHOSPHATE INJECTION 3 MG EA.      DRAIN/INJECT LARGE JOINT/BURSA   2. Rotator cuff arthropathy of both shoulders  M12.811 716.81     M12.812     3. Chronic left shoulder pain  M25.512 719.41 betamethasone (Celestone Soluspan) 6 mg/mL injection    G89.29 338.29 BETAMETHASONE ACETATE & SODIUM PHOSPHATE INJECTION 3 MG EA.      DRAIN/INJECT LARGE JOINT/BURSA   4. Osteoporosis, unspecified osteoporosis type, unspecified pathological fracture presence  M81.0 733.00    5. Shoulder effusion, left  M25.412 719.01        PLAN:  After timeout and under sterile conditions, left shoulder aspirated 25 cc of light yellow blood tinged fluid. The fluid was discarded. After discussing treatment options, patient's left shoulder was injected with 4 cc Marcaine and 1/2 cc Celestone. We discussed possible need for left reverse shoulder arthroplasty at some time in the future if pain continues. She will follow with Dr. Zina Sierra if needed.      Scribed by MD Ammon Eason) as dictated by Dave Whitley MD

## 2020-07-28 DIAGNOSIS — R60.0 EDEMA OF BOTH LEGS: ICD-10-CM

## 2020-07-28 NOTE — TELEPHONE ENCOUNTER
Pt stated that she has swelling in legs. Pt stated that she has not taken medication in awhile and needs a refill, Please advise.    Requested Prescriptions     Pending Prescriptions Disp Refills    furosemide (LASIX) 20 mg tablet 30 Tab 12     Sig: TAKE ONE TABLET BY MOUTH DAILY as needed FOR EDEMA

## 2020-07-29 RX ORDER — FUROSEMIDE 20 MG/1
TABLET ORAL
Qty: 30 TAB | Refills: 12 | Status: SHIPPED | OUTPATIENT
Start: 2020-07-29 | End: 2022-02-04 | Stop reason: SDUPTHER

## 2020-07-30 ENCOUNTER — VIRTUAL VISIT (OUTPATIENT)
Dept: FAMILY MEDICINE CLINIC | Age: 85
End: 2020-07-30

## 2020-07-30 DIAGNOSIS — E78.00 HYPERCHOLESTEROLEMIA: ICD-10-CM

## 2020-07-30 DIAGNOSIS — M15.9 PRIMARY OSTEOARTHRITIS INVOLVING MULTIPLE JOINTS: ICD-10-CM

## 2020-07-30 DIAGNOSIS — E78.00 HYPERCHOLESTEROLEMIA: Primary | ICD-10-CM

## 2020-07-30 NOTE — PROGRESS NOTES
Lionel Young presents today for   Chief Complaint   Patient presents with    Arthritis     Follow up     Blood sugar problem     Follow up elevated fasting blood sugar       Lionel Young preferred language for health care discussion is english/other. Is someone accompanying this pt? No    Is the patient using any DME equipment during OV? No    Depression Screening:  3 most recent PHQ Screens 4/13/2020   Little interest or pleasure in doing things Not at all   Feeling down, depressed, irritable, or hopeless Not at all   Total Score PHQ 2 0       Learning Assessment:  Learning Assessment 1/27/2020   PRIMARY LEARNER Patient   HIGHEST LEVEL OF EDUCATION - PRIMARY LEARNER  DID NOT GRADUATE [facility] PRIMARY LEARNER NONE   CO-LEARNER CAREGIVER No   PRIMARY LANGUAGE ENGLISH    NEED -   LEARNER PREFERENCE PRIMARY LISTENING   ANSWERED BY self   RELATIONSHIP SELF       Abuse Screening:  Abuse Screening Questionnaire 1/27/2020   Do you ever feel afraid of your partner? N   Are you in a relationship with someone who physically or mentally threatens you? N   Is it safe for you to go home? Y       Fall Risk  Fall Risk Assessment, last 12 mths 4/13/2020   Able to walk? Yes   Fall in past 12 months? Yes   Fall with injury? Yes   Number of falls in past 12 months 1   Fall Risk Score 2         Coordination of Care:  1. Have you been to the ER, urgent care clinic since your last visit? Hospitalized since your last visit? 3/2020, [facility] ED related to fall. 2. Have you seen or consulted any other health care providers outside of the [address] [place] [address] [facility] since your last visit? Include any pap smears or colon screening. Recent ortho visit related to shoulder pain 7/27/20. Advance Directive:  1. Do you have an advance directive in place? Patient Reply:No    2. If not, would you like material regarding how to put one in place?  Patient Reply: No

## 2020-07-30 NOTE — PROGRESS NOTES
Aubree Chisholm is a 82 y.o. female, evaluated via audio-only technology on 7/30/2020 for Arthritis (Follow up ) and Cholesterol Problem  . Assessment & Plan:   Diagnoses and all orders for this visit:    1. Hypercholesterolemia  -     METABOLIC PANEL, COMPREHENSIVE; Future  -     LIPID PANEL; Future    2. Primary osteoarthritis involving multiple joints  Improved; care as per ortho    Follow-up and Dispositions    · Return in about 3 months (around 10/30/2020) for high cholesterol, arthritis. 12  Subjective:   Pt has been doing well. She had cont to have shoulder pain. She saw ortho earlier this week. She had steroid injections of both shoulders that day and reports they do fell much better. She is able to sleep now. March 2020 labs reviewed with pt. Pt has been trying to do well with her diet. Prior to Admission medications    Medication Sig Start Date End Date Taking? Authorizing Provider   furosemide (LASIX) 20 mg tablet TAKE ONE TABLET BY MOUTH DAILY as needed FOR EDEMA 7/29/20  Yes Marcos Ramirez MD   atorvastatin (LIPITOR) 40 mg tablet Take 1 Tab by mouth daily. 12/11/19  Yes Liliane Bob MD   fluticasone propionate (FLONASE) 50 mcg/actuation nasal spray USE 2 SPRAYS IN BOTH NOSTRILS DAILY 10/15/19  Yes Marcos Ramirez MD   celecoxib (CELEBREX) 200 mg capsule Take 1 Cap by mouth two (2) times daily as needed for Pain. 10/13/19  Yes Liliane Bob MD   miscellaneous medical supply misc DISPENSE (1) BEDSIDE COMMODE. DX:M15.9,Z74.09,Z96.641,M62.81,Z91.81 4/5/17  Yes Liliane Bob MD   VITAMIN D3 1,000 unit tablet  8/2/16  Yes Provider, Historical   gabapentin (NEURONTIN) 300 mg capsule Take 300 mg by mouth three (3) times daily. Yes Provider, Historical   cyanocobalamin (VITAMIN B-12) 500 mcg tablet Take 500 mcg by mouth daily.      Yes Provider, Historical     Patient Active Problem List   Diagnosis Code    High vitamin D level E67.3    Dyslipidemia E78.5    Varicose veins I83.90    Osteoarthritis M19.90    Osteoporosis M81.0    Status post right hip replacement Z96.641    Impaired mobility and ADLs Z74.09, Z78.9    Peripheral neuropathy G62.9    Peripheral vascular disease (HCC) I73.9    Postoperative anemia due to acute blood loss D62    Osteoarthritis of right hip M16.11    Decreased calculated GFR R94.4    History of kidney stones Z87.442    Numbness in both hands R20.0    Small bowel obstruction (HCC) K56.609    Hypercholesterolemia E78.00    Fx intertrochanteric hip (HCC) S72.143A    Muscle weakness (generalized) M62.81    Difficulty in walking, not elsewhere classified R26.2    ACP (advance care planning) Z71.89    Severe obesity (BMI 35.0-39. 9) E66.01    Age-related cataract of both eyes H25.9     Current Outpatient Medications   Medication Sig Dispense Refill    furosemide (LASIX) 20 mg tablet TAKE ONE TABLET BY MOUTH DAILY as needed FOR EDEMA 30 Tab 12    atorvastatin (LIPITOR) 40 mg tablet Take 1 Tab by mouth daily. 30 Tab 12    fluticasone propionate (FLONASE) 50 mcg/actuation nasal spray USE 2 SPRAYS IN BOTH NOSTRILS DAILY 16 g 12    celecoxib (CELEBREX) 200 mg capsule Take 1 Cap by mouth two (2) times daily as needed for Pain. 60 Cap 12    miscellaneous medical supply misc DISPENSE (1) BEDSIDE COMMODE. DX:M15.9,Z74.09,Z96.641,M62.81,Z91.81 1 Each 0    VITAMIN D3 1,000 unit tablet       gabapentin (NEURONTIN) 300 mg capsule Take 300 mg by mouth three (3) times daily.  cyanocobalamin (VITAMIN B-12) 500 mcg tablet Take 500 mcg by mouth daily. No Known Allergies  Past Medical History:   Diagnosis Date    Decreased calculated GFR 12/5/2014    High vitamin D level 12/6/2014    Vitamin D 25-Hydroxy (12/06/2014) = 138.9     History of echocardiogram 08/29/2014    EF 60%. No WMA. Mild conc LVH. Gr 1 DDfx. Mild RVE. Mild CATRACHO. Sm right & sm left pleural effusion.       Hypercholesterolemia     Lower extremity venous duplex 10/02/2014    No DVT bilaterally.  Numbness in both hands     Osteoarthritis     Osteoarthritis of right hip     Osteoporosis     Peripheral neuropathy     Peripheral vascular disease (HCC)     Postoperative anemia due to acute blood loss     Vaginal fibroids     resolved s/p hyst    Varicose veins      Past Surgical History:   Procedure Laterality Date    HX CHOLECYSTECTOMY      HX HERNIA REPAIR      abdominal, had 2nd surgery for infection    HX HIP FRACTURE TX Left     HX HIP REPLACEMENT Right 2014    S/P Right total hip replacement (2014 - Dr. Guadalupe Cameron)   [address] HX OTHER SURGICAL  2018    Left foot toe surgery-DANA VARNER @ 1 foot 2 foot    TOTAL KNEE ARTHROPLASTY      left    TOTAL KNEE ARTHROPLASTY      right    VASCULAR SURGERY PROCEDURE UNLIST      Bilateral leg vein stripping     Family History   Problem Relation Age of Onset   Miramontes Arthritis-rheumatoid Mother     Heart Attack Mother     Arthritis-rheumatoid Father     Other Father         gangrene    Arthritis-osteo Sister     Other Brother         pna    Arthritis-osteo Brother     Cancer Daughter         in remission    Arthritis-osteo Brother     Diabetes Brother     Arthritis-osteo Sister      Social History     Tobacco Use    Smoking status: Former Smoker     Years: 1.00     Types: Cigarettes     Last attempt to quit: 1982     Years since quittin.6    Smokeless tobacco: Never Used   Substance Use Topics    Alcohol use: No     Comment: Quit alcohol in        Review of Systems   Constitutional: Negative. HENT: Negative. Respiratory: Negative. Cardiovascular: Negative. All other systems reviewed and are negative. No flowsheet data found.      Marcos Hernández, who was evaluated through a patient-initiated, synchronous (real-time) audio only encounter, and/or her healthcare decision maker, is aware that it is a billable service, with coverage as determined by her insurance carrier. She provided verbal consent to proceed: Yes. She has not had a related appointment within my department in the past 7 days or scheduled within the next 24 hours.       Total Time: minutes: 5-10 minutes    Alex Villavicencio MD

## 2020-09-30 ENCOUNTER — OFFICE VISIT (OUTPATIENT)
Dept: ORTHOPEDIC SURGERY | Age: 85
End: 2020-09-30
Payer: MEDICAID

## 2020-09-30 VITALS
BODY MASS INDEX: 37.99 KG/M2 | HEIGHT: 65 IN | RESPIRATION RATE: 14 BRPM | HEART RATE: 68 BPM | WEIGHT: 228 LBS | OXYGEN SATURATION: 98 % | SYSTOLIC BLOOD PRESSURE: 116 MMHG | TEMPERATURE: 97.8 F | DIASTOLIC BLOOD PRESSURE: 69 MMHG

## 2020-09-30 DIAGNOSIS — M25.512 CHRONIC LEFT SHOULDER PAIN: ICD-10-CM

## 2020-09-30 DIAGNOSIS — M25.412 SHOULDER EFFUSION, LEFT: ICD-10-CM

## 2020-09-30 DIAGNOSIS — M12.812 ROTATOR CUFF ARTHROPATHY OF LEFT SHOULDER: ICD-10-CM

## 2020-09-30 DIAGNOSIS — G89.29 CHRONIC LEFT SHOULDER PAIN: ICD-10-CM

## 2020-09-30 DIAGNOSIS — M19.012 PRIMARY OSTEOARTHRITIS, LEFT SHOULDER: Primary | ICD-10-CM

## 2020-09-30 PROCEDURE — 99213 OFFICE O/P EST LOW 20 MIN: CPT | Performed by: SPECIALIST

## 2020-09-30 PROCEDURE — 20610 DRAIN/INJ JOINT/BURSA W/O US: CPT | Performed by: SPECIALIST

## 2020-09-30 RX ORDER — BETAMETHASONE SODIUM PHOSPHATE AND BETAMETHASONE ACETATE 3; 3 MG/ML; MG/ML
6 INJECTION, SUSPENSION INTRA-ARTICULAR; INTRALESIONAL; INTRAMUSCULAR; SOFT TISSUE ONCE
Qty: 0.5 ML | Refills: 0
Start: 2020-09-30 | End: 2020-09-30

## 2020-09-30 NOTE — PROGRESS NOTES
Patient: Petar Alcantara                MRN: [id number]       SSN: [id number]  YOB: 1935        AGE: 82 y.o. SEX: female    Leif Aquino MD   09/30/20    CC: LEFT SHOULDER PAIN AND EFFUSION    HISTORY:  Petar Alcantara is a 82 y.o. female who is seen for recurrent left shoulder pain and swelling. She responded well to a cortisone injection and aspiration at last ov, but pain has returned. She has been experiencing left shoulder pain for the past several years. She does not recall any injury. She feels shoulder pain with overhead activities and at night. She has noticed the return of fluctuant left shoulder swelling recently. She is not interested in shoulder surgery. She has a h/o of right shoulder arthritis. She was previously seen for left little finger fracture--doing well. She sustained a left 5th finger injury on 3/14/19 when she fell off her bed while trying to pull up her pants. She toppled forward off of the bed and hit her left hand on the floor. She has a h/o osteoporosis. Pain Assessment  7/27/2020   Location of Pain Shoulder   Location Modifiers Left   Severity of Pain 10   Quality of Pain Throbbing   Duration of Pain -   Frequency of Pain Intermittent   Date Pain First Started -   Aggravating Factors -   Limiting Behavior No   Relieving Factors Other (Comment)   Relieving Factors Comment Bengay   Result of Injury No   Work-Related Injury -   Type of Injury -     Occupation, etc:  Ms. Abran Gutierrez previously worked as a . She lives in [place] with her son and younger brother. She is fairly independent despite her disabilities. She does her own cooking, cleaning, and laundry. She wants to remain independent to keep her strength and stay alive. She has 2 other sons, 2 daughters and 9 grandchildren. Ms. Abran Gutierrez weighs 228 lbs and is 5'5\" tall.        Weight Metrics 9/30/2020 7/27/2020 4/27/2020 3/11/2020 1/27/2020 12/27/2019 12/11/2019   Weight 228 lb 228 lb 217 lb 12.8 oz 230 lb 6.4 oz 227 lb 230 lb 229 lb 9.6 oz   BMI 37.94 kg/m2 37.94 kg/m2 36.24 kg/m2 38.34 kg/m2 37.77 kg/m2 38.27 kg/m2 40.03 kg/m2     Patient Active Problem List   Diagnosis Code    High vitamin D level E67.3    Dyslipidemia E78.5    Varicose veins I83.90    Osteoarthritis M19.90    Osteoporosis M81.0    Status post right hip replacement Z96.641    Impaired mobility and ADLs Z74.09, Z78.9    Peripheral neuropathy G62.9    Peripheral vascular disease (HCC) I73.9    Postoperative anemia due to acute blood loss D62    Osteoarthritis of right hip M16.11    Decreased calculated GFR R94.4    History of kidney stones Z87.442    Numbness in both hands R20.0    Small bowel obstruction (HCC) K56.609    Hypercholesterolemia E78.00    Fx intertrochanteric hip (HCC) S72.143A    Muscle weakness (generalized) M62.81    Difficulty in walking, not elsewhere classified R26.2    ACP (advance care planning) Z71.89    Severe obesity (BMI 35.0-39. 9) E66.01    Age-related cataract of both eyes H25.9     REVIEW OF SYSTEMS:    Constitutional Symptoms: Negative   Eyes: Negative   Ears, Nose, Throat and Mouth: Negative   Cardiovascular: Negative   Respiratory: Negative   Genitourinary: Per HPI   Gastrointestinal: Per HPI   Integumentary (Skin and/or Breast): Negative   Musculoskeletal: Per HPI   Endocrine/Rheumatologic: Negative   Neurological: Per HPI   Hematology/Lymphatic: Negative    Allergic/Immunologic: Negative   Phychiatric: Negative    Social History     Socioeconomic History    Marital status:      Spouse name: Not on file    Number of children: Not on file    Years of education: Not on file    Highest education level: Not on file   Occupational History    Not on file   Social Needs    Financial resource strain: Not on file    Food insecurity     Worry: Not on file     Inability: Not on file    Transportation needs     Medical: Not on file     Non-medical: Not on file   Tobacco Use  Smoking status: Former Smoker     Years: 1.00     Types: Cigarettes     Last attempt to quit: 1982     Years since quittin.7    Smokeless tobacco: Never Used   Substance and Sexual Activity    Alcohol use: No     Comment: Quit alcohol in     Drug use: Yes     Types: Prescription, OTC     Comment: prescribed    Sexual activity: Never   Lifestyle    Physical activity     Days per week: Not on file     Minutes per session: Not on file    Stress: Not on file   Relationships    Social connections     Talks on phone: Not on file     Gets together: Not on file     Attends Jewish service: Not on file     Active member of club or organization: Not on file     Attends meetings of clubs or organizations: Not on file     Relationship status: Not on file    Intimate partner violence     Fear of current or ex partner: Not on file     Emotionally abused: Not on file     Physically abused: Not on file     Forced sexual activity: Not on file   Other Topics Concern    Not on file   Social History Narrative    Not on file       No Known Allergies    Current Outpatient Medications   Medication Sig    furosemide (LASIX) 20 mg tablet TAKE ONE TABLET BY MOUTH DAILY as needed FOR EDEMA    atorvastatin (LIPITOR) 40 mg tablet Take 1 Tab by mouth daily.  fluticasone propionate (FLONASE) 50 mcg/actuation nasal spray USE 2 SPRAYS IN BOTH NOSTRILS DAILY    celecoxib (CELEBREX) 200 mg capsule Take 1 Cap by mouth two (2) times daily as needed for Pain.  miscellaneous medical supply misc DISPENSE (1) BEDSIDE COMMODE. DX:M15.9,Z74.09,Z96.641,M62.81,Z91.81    VITAMIN D3 1,000 unit tablet     gabapentin (NEURONTIN) 300 mg capsule Take 300 mg by mouth three (3) times daily.  cyanocobalamin (VITAMIN B-12) 500 mcg tablet Take 500 mcg by mouth daily. No current facility-administered medications for this visit.         PHYSICAL EXAMINATION:  Visit Vitals  /69 (BP 1 Location: Left arm, BP Patient Position: Sitting)   Pulse 68   Temp 97.8 °F (36.6 °C)   Resp 14   Ht 5' 5\" (1.651 m)   Wt 228 lb (103.4 kg)   LMP 12/31/1985   SpO2 98%   BMI 37.94 kg/m²       ORTHO EXAMINATION:  Examination Right shoulder Left shoulder   Skin Intact Intact   Effusion - +++   Biceps deformity - -   Atrophy - -   AC joint tenderness - -   Acromial tenderness - +   Biceps tenderness - -   Forward flexion/Elevation  170   Active abduction  160   External rotation ROM 30 30   Internal rotation ROM 90 90   Apprehension - -   Impingement - -   Drop Arm Test - -   Neurovascular Intact Intact      Examination Right Hand Left Hand   Skin Intact Intact   Deformity - -   Swelling - -   Tenderness - -   Finger flexion Full Full   Finger extension Full Full   Sensation Normal Normal   Capillary refill Normal Normal   Heberden's nodes - -   Dupuytren's - -   Enlargement of left 5th PIP joint  Using Rollator walker    TIME OUT:  Chart reviewed for the following:   Karina Bedoya MD, have reviewed the History, Physical and updated the Allergic reactions for [address] performed immediately prior to start of procedure:  Karina Bedoya MD, have performed the following reviews on Markell Soria prior to the start of the procedure:          * Patient was identified by name and date of birth   * Agreement on procedure being performed was verified  * Risks and Benefits explained to the patient  * Procedure site verified and marked as necessary  * Patient was positioned for comfort  * Consent was obtained     Time: 9:17 AM    Date of procedure: 9/30/2020  Procedure performed by:  Winifred Schmidt MD  Ms. Huang tolerated the procedure well with no complications.      RADIOGRAPHS:  XR LEFT SHOULDER 9/18/17 DANYEL  IMPRESSION:  Three views - No fractures, no acromioclavicular narrowing, severe glenohumeral narrowing, + calcific densities, rotator cuff arthropathy    XR LEFT FINGERS 4/27/20 DANYEL  IMPRESSION:  Three views - impacted proximal 5th phalanx intraarticular fracture with minimal displacement and early callus formation, no degenerative changes, osteopenia    XR LEFT HAND 3/14/19 VELOCITY  -I have independently reviewed these images during this office visit. -Dr. Georgina Caldwell:  Three views - impacted proximal 5th phalanx fracture with 30 degree apex volar angulation at fracture site, no joint space narrowing, osteoporosis. IMPRESSION:      ICD-10-CM ICD-9-CM    1. Primary osteoarthritis, left shoulder  M19.012 715.11 betamethasone (Celestone Soluspan) 6 mg/mL injection      BETAMETHASONE ACETATE & SODIUM PHOSPHATE INJECTION 3 MG EA.      DRAIN/INJECT LARGE JOINT/BURSA   2. Chronic left shoulder pain  M25.512 719.41 betamethasone (Celestone Soluspan) 6 mg/mL injection    G89.29 338.29 BETAMETHASONE ACETATE & SODIUM PHOSPHATE INJECTION 3 MG EA.      DRAIN/INJECT LARGE JOINT/BURSA   3. Shoulder effusion, left  M25.412 719.01    4. Rotator cuff arthropathy of left shoulder  M12.812 716.81        PLAN:  After timeout and under sterile conditions, left shoulder aspirated 100 cc of light yellow fluid. The fluid was discarded. After discussing treatment options, patient's left shoulder was injected with 4 cc Marcaine and 1/2 cc Celestone. We discussed possible need for left reverse shoulder arthroplasty at some time in the future if pain continues. She will follow up as needed.     Scribed by Rodrigo Mccurdy ([address]) as dictated by Ajith Peoples MD

## 2020-10-20 ENCOUNTER — TELEPHONE (OUTPATIENT)
Dept: FAMILY MEDICINE CLINIC | Age: 85
End: 2020-10-20

## 2020-10-20 ENCOUNTER — OFFICE VISIT (OUTPATIENT)
Dept: FAMILY MEDICINE CLINIC | Age: 85
End: 2020-10-20
Payer: MEDICAID

## 2020-10-20 DIAGNOSIS — J10.1 INFLUENZA B: ICD-10-CM

## 2020-10-20 DIAGNOSIS — R05.8 NON-PRODUCTIVE COUGH: ICD-10-CM

## 2020-10-20 DIAGNOSIS — J02.0 STREP PHARYNGITIS: Primary | ICD-10-CM

## 2020-10-20 DIAGNOSIS — R19.7 DIARRHEA, UNSPECIFIED TYPE: ICD-10-CM

## 2020-10-20 LAB
FLUAV+FLUBV AG NOSE QL IA.RAPID: NEGATIVE POS/NEG
FLUAV+FLUBV AG NOSE QL IA.RAPID: POSITIVE POS/NEG
S PYO AG THROAT QL: POSITIVE
VALID INTERNAL CONTROL?: YES
VALID INTERNAL CONTROL?: YES

## 2020-10-20 PROCEDURE — 99213 OFFICE O/P EST LOW 20 MIN: CPT | Performed by: NURSE PRACTITIONER

## 2020-10-20 PROCEDURE — 87880 STREP A ASSAY W/OPTIC: CPT | Performed by: NURSE PRACTITIONER

## 2020-10-20 PROCEDURE — 87804 INFLUENZA ASSAY W/OPTIC: CPT | Performed by: NURSE PRACTITIONER

## 2020-10-20 RX ORDER — AMOXICILLIN 500 MG/1
500 CAPSULE ORAL 2 TIMES DAILY
Qty: 14 CAP | Refills: 0 | Status: SHIPPED | OUTPATIENT
Start: 2020-10-20 | End: 2020-10-27

## 2020-10-20 NOTE — Clinical Note
We have tested this patient for Covid. She is in absolutely no distress but I did notice some wheeze to her left upper lobe. I decided to treat her for upper respiratory infection along with her strep. Due to her fragile age I decided to treat her with amoxicillin twice daily x7 days. Just given you a heads up so you can keep an ear out for her. We are waiting on her Covid results.   Thank you

## 2020-10-20 NOTE — PROGRESS NOTES
SUBJECTIVE:  Chief Complaint   Patient presents with    Sweats    Cough    Sinus Pain    Diarrhea     Patient denies fever, shortness of breath, fatigue. Patient states she has been taking Sudafed and Robitussin for her symptoms. All of her symptoms started on October 16, 2020. Patient denies recent travel. Pt exposed to sick contacts under investigations for possible Covid UNKNOWN. Patient is retired but she does get at her son's house every weekend to be around family for about 2 or 3 hours each day. Pt is not a current smoker. OBJECTIVE    Visit Vitals  [facility] 12/31/1985      General:  cooperative, pleasant and in no apparent distress. Sick but not toxic appearing. Eyes:   The lids are without swelling, lesions, or drainage. The conjunctiva is clear and noninjected. ENT:  pharynx erythematous without exudate. Neck: normal and no adenopathy. Lungs/CV: expiratory wheezes and no resp distress. Heart: regular rhythm normal rate. Skin:  No rashes, no jaundice. ASSESSMENT / PLAN     ICD-10-CM ICD-9-CM    1. Strep pharyngitis  J02.0 034.0 AMB POC RAPID INFLUENZA TEST      AMB POC RAPID STREP A      amoxicillin (AMOXIL) 500 mg capsule      NOVEL CORONAVIRUS (COVID-19)      NOVEL CORONAVIRUS (COVID-19)   2. Influenza B  J10.1 487.1 AMB POC RAPID INFLUENZA TEST      NOVEL CORONAVIRUS (COVID-19)      NOVEL CORONAVIRUS (COVID-19)   3. Diarrhea, unspecified type  R19.7 787.91    4. Non-productive cough  R05 786.2        Based on CDC recommendations and limited testing supplies, only those patients who meet criteria will be tested for Covid.      High priority groups for testing   Symptomatic and/or Exposure /Test for Covid  Immunocompromised host (on prednisone, biological therapy, blood cancer, metastatic cancer or active chemotherapy)   [facility] worker in the home    Other high-risk group: o age >47   o Uncontrolled DM   o Uncontrolled HTN   o BMI >40, CKD/ESRD    Dialysis patients (patients going to HD units, not asymptomatic home HD/PD)    Anyone living in a congregate setting     Non High-risk patient category           Test for COVID-19   Asymptomatic, no known exposure  No    Asymptomatic, possible exposure  No    Asymptomatic, definite exposure  Provider discretion    Symptomatic, no known exposure  Yes    Symptomatic, + exposure  Yes      Positive for strep. Positive for Influenza B. Patient does  meet criteria for Covid testing. COVID-19 testing was completed. Strep instructions: Instructed pt to change toothbrush after 24 hours of taking ABX therapy to help prevent reinfection of strep. Patient verbalized understanding. Covid Instructions: Instructed pt on the importance of rest, fluid intake (with avoidance of red fluids), and vit C supplements. Instructed pt to check temp if possible and to take acetaminophen or NSAIDs if fevers are noted. Instructed patient to remember to wash hands, disinfect surroundings, and avoid touching face. Instructed pt to remain home and and self quarantine until Covid results are negative and all symptoms have improved or subsided. If they must leave home, wear a mask. Patient verbalized understanding. We have provided the patient with a detailed after visit summary which was reviewed, and red flag symptoms that would warrant an ER visit were emphasized. CC'd chart to PCP: Yes: Comment: REGGIE Ramirze     We have tested this patient for Covid. She is in absolutely no distress but I did notice some wheeze to her left upper lobe. I decided to treat her for upper respiratory infection along with her strep. Due to her fragile age I decided to treat her with amoxicillin twice daily x7 days. Just given you a heads up so you can keep an ear out for her. We are waiting on her Covid results.   Thank you    Dr. Ronal Kerns, SELVINP-C, DNP      This visit was provided as a focused evaluation during the COVID -19 pandemic/national emergency. A comprehensive review of all previous patient history and testing was not conducted. Pertinent findings were elicited during the visit.

## 2020-10-20 NOTE — PROGRESS NOTES
Mirna Bolton presents today for   Chief Complaint   Patient presents with    Sweats    Cough       Is someone accompanying this pt? no    Is the patient using any DME equipment during OV? no    Travel and Exposure Screening was performed during check in or rooming process Yes  No      Depression Screening:  3 most recent PHQ Screens 10/20/2020   Little interest or pleasure in doing things Not at all   Feeling down, depressed, irritable, or hopeless Not at all   Total Score PHQ 2 0       Fall Risk  Fall Risk Assessment, last 12 mths 10/20/2020   Able to walk? Yes   Fall in past 12 months? No   Fall with injury? -   Number of falls in past 12 months -   Fall Risk Score -       This Visit Test  Results for orders placed or performed in visit on 04/45/69   METABOLIC PANEL, COMPREHENSIVE   Result Value Ref Range    Glucose 76 65 - 99 mg/dL    BUN 14 8 - 27 mg/dL    Creatinine 0.72 0.57 - 1.00 mg/dL    GFR est non-AA 78 >59 mL/min/1.73    GFR est AA 90 >59 mL/min/1.73    BUN/Creatinine ratio 19 12 - 28    Sodium 144 134 - 144 mmol/L    Potassium 4.4 3.5 - 5.2 mmol/L    Chloride 106 96 - 106 mmol/L    CO2 21 20 - 29 mmol/L    Calcium 9.3 8.7 - 10.3 mg/dL    Protein, total 6.5 6.0 - 8.5 g/dL    Albumin 3.8 3.6 - 4.6 g/dL    GLOBULIN, TOTAL 2.7 1.5 - 4.5 g/dL    A-G Ratio 1.4 1.2 - 2.2    Bilirubin, total 0.5 0.0 - 1.2 mg/dL    Alk.  phosphatase 117 39 - 117 IU/L    AST (SGOT) 18 0 - 40 IU/L    ALT (SGPT) 18 0 - 32 IU/L   LIPID PANEL   Result Value Ref Range    Cholesterol, total 202 (H) 100 - 199 mg/dL    Triglyceride 79 0 - 149 mg/dL    HDL Cholesterol 58 >39 mg/dL    VLDL, calculated 16 5 - 40 mg/dL    LDL, calculated 128 (H) 0 - 99 mg/dL   CVD REPORT   Result Value Ref Range    INTERPRETATION Note    SPECIMEN STATUS REPORT   Result Value Ref Range    SPECIMEN STATUS REPORT COMMENT

## 2020-10-22 LAB — SARS-COV-2, NAA: NOT DETECTED

## 2020-10-22 NOTE — PROGRESS NOTES
[facility] clinic nurses: Please notify pt their Covid test was Negative. If patients symptoms have not improved, they need to follow-up with their PCP. Remind pt to stay home but if they must leave home, take all precautions: wear a mask, continue social distancing, disinfect home/work areas, avoid touching the face, and sanitize hands frequently. If they need a return to work note, please provide. Thank you.

## 2020-10-22 NOTE — PROGRESS NOTES
Patient was called and spoke to the sone that is on [employer] and verified 2 identifiers. Son was notified that his mother's results for covid was negative. Reminded him to tell her to continue to social distance, wear her mask and to wash hands with hand  frequently.

## 2020-10-30 ENCOUNTER — VIRTUAL VISIT (OUTPATIENT)
Dept: FAMILY MEDICINE CLINIC | Age: 85
End: 2020-10-30
Payer: MEDICAID

## 2020-10-30 DIAGNOSIS — J02.0 STREP PHARYNGITIS: ICD-10-CM

## 2020-10-30 DIAGNOSIS — E78.00 HYPERCHOLESTEROLEMIA: ICD-10-CM

## 2020-10-30 DIAGNOSIS — J30.2 CHRONIC SEASONAL ALLERGIC RHINITIS: Primary | ICD-10-CM

## 2020-10-30 DIAGNOSIS — J10.1 INFLUENZA B: ICD-10-CM

## 2020-10-30 DIAGNOSIS — R19.5 LOOSE STOOLS: ICD-10-CM

## 2020-10-30 PROCEDURE — 99442 PR PHYS/QHP TELEPHONE EVALUATION 11-20 MIN: CPT | Performed by: FAMILY MEDICINE

## 2020-10-30 RX ORDER — FLUTICASONE PROPIONATE 50 MCG
SPRAY, SUSPENSION (ML) NASAL
Qty: 16 G | Refills: 12 | Status: SHIPPED | OUTPATIENT
Start: 2020-10-30 | End: 2021-12-27

## 2020-10-30 NOTE — PROGRESS NOTES
Maria Alejandra Javier is a 82 y.o. female, evaluated via audio-only technology on 10/30/2020 for Cholesterol Problem (3 month follow  up) and Arthritis (3 month follow up )  . Assessment & Plan:   Diagnoses and all orders for this visit:    1. Chronic seasonal allergic rhinitis  -     fluticasone propionate (FLONASE) 50 mcg/actuation nasal spray; USE 2 SPRAYS IN BOTH NOSTRILS DAILY    2. Strep pharyngitis  Resolved    3. Influenza B  Resolved. 4. Hypercholesterolemia  Pt reminded to do labs    5. Loose stools  Likely secondary to abx from recent illness. Recommend trial of probiotic and fiber supplement. The complexity of medical decision making for this visit is moderate   Follow-up and Dispositions    · Return in about 3 months (around 1/30/2021) for high cholesterol, lab review. 12  Subjective:     appt scheduled for f/u of hld and labs today. However, pt recently dx with strep and influenza B. She has not had a chance to do her labs yet. Pt has completed her abx and is feeling a lot better. She is almost back to her usual state of health. She still has an occasional cough. She does not have any fevers or chills. She is still taking an otc med prn. Pt has had some loose stools since finishing her abx. Prior to Admission medications    Medication Sig Start Date End Date Taking? Authorizing Provider   fluticasone propionate (FLONASE) 50 mcg/actuation nasal spray USE 2 SPRAYS IN BOTH NOSTRILS DAILY 10/30/20  Yes Patty Ramirez MD   furosemide (LASIX) 20 mg tablet TAKE ONE TABLET BY MOUTH DAILY as needed FOR EDEMA 7/29/20  Yes Giovanni Ramirez MD   atorvastatin (LIPITOR) 40 mg tablet Take 1 Tab by mouth daily. 12/11/19  Yes Gucci Allred MD   celecoxib (CELEBREX) 200 mg capsule Take 1 Cap by mouth two (2) times daily as needed for Pain. 10/13/19  Yes Gucci Allred MD   miscellaneous medical supply misc DISPENSE (1) BEDSIDE COMMODE. DX:M15.9,Z74.09,Z96.641,M62.81,Z91.81 4/5/17  Yes Keisha Parsons MD   VITAMIN D3 1,000 unit tablet  8/2/16  Yes Provider, Historical   gabapentin (NEURONTIN) 300 mg capsule Take 300 mg by mouth three (3) times daily. Yes Provider, Historical   cyanocobalamin (VITAMIN B-12) 500 mcg tablet Take 500 mcg by mouth daily. Yes Provider, Historical     Patient Active Problem List   Diagnosis Code    High vitamin D level E67.3    Dyslipidemia E78.5    Varicose veins I83.90    Osteoarthritis M19.90    Osteoporosis M81.0    Status post right hip replacement Z96.641    Impaired mobility and ADLs Z74.09, Z78.9    Peripheral neuropathy G62.9    Peripheral vascular disease (HCC) I73.9    Postoperative anemia due to acute blood loss D62    Osteoarthritis of right hip M16.11    Decreased calculated GFR R94.4    History of kidney stones Z87.442    Numbness in both hands R20.0    Small bowel obstruction ([facility]) K56.609    Hypercholesterolemia E78.00    Fx intertrochanteric hip ([facility]) S72.143A    Muscle weakness (generalized) M62.81    Difficulty in walking, not elsewhere classified R26.2    ACP (advance care planning) Z71.89    Severe obesity (BMI 35.0-39. 9) E66.01    Age-related cataract of both eyes H25.9     Current Outpatient Medications   Medication Sig Dispense Refill    fluticasone propionate (FLONASE) 50 mcg/actuation nasal spray USE 2 SPRAYS IN BOTH NOSTRILS DAILY 16 g 12    furosemide (LASIX) 20 mg tablet TAKE ONE TABLET BY MOUTH DAILY as needed FOR EDEMA 30 Tab 12    atorvastatin (LIPITOR) 40 mg tablet Take 1 Tab by mouth daily. 30 Tab 12    celecoxib (CELEBREX) 200 mg capsule Take 1 Cap by mouth two (2) times daily as needed for Pain. 60 Cap 12    miscellaneous medical supply misc DISPENSE (1) BEDSIDE COMMODE. DX:M15.9,Z74.09,Z96.641,M62.81,Z91.81 1 Each 0    VITAMIN D3 1,000 unit tablet       gabapentin (NEURONTIN) 300 mg capsule Take 300 mg by mouth three (3) times daily.       cyanocobalamin (VITAMIN B-12) 500 mcg tablet Take 500 mcg by mouth daily. No Known Allergies  Past Medical History:   Diagnosis Date    Decreased calculated GFR 2014    High vitamin D level 2014    Vitamin D 25-Hydroxy (2014) = 138.9     History of echocardiogram 2014    EF 60%. No WMA. Mild conc LVH. Gr 1 DDfx. Mild RVE. Mild CATRACHO. Sm right & sm left pleural effusion.  Hypercholesterolemia     Lower extremity venous duplex 10/02/2014    No DVT bilaterally.     Numbness in both hands     Osteoarthritis     Osteoarthritis of right hip     Osteoporosis     Peripheral neuropathy     Peripheral vascular disease (HCC)     Postoperative anemia due to acute blood loss     Vaginal fibroids     resolved s/p hyst    Varicose veins      Past Surgical History:   Procedure Laterality Date    HX CHOLECYSTECTOMY      HX HERNIA REPAIR      abdominal, had 2nd surgery for infection    HX HIP FRACTURE TX Left     HX HIP REPLACEMENT Right 2014    S/P Right total hip replacement (2014 - Dr. Cecil Corcoran)   [address] HX OTHER SURGICAL  2018    Left foot toe surgery-DANA VARNER @ 1 foot 2 foot    TOTAL KNEE ARTHROPLASTY      left    TOTAL KNEE ARTHROPLASTY      right    VASCULAR SURGERY PROCEDURE UNLIST      Bilateral leg vein stripping     Family History   Problem Relation Age of Onset   [address] [facility] Arthritis-rheumatoid Mother     Heart Attack Mother     Arthritis-rheumatoid Father     Other Father         gangrene    Arthritis-osteo Sister     Other Brother         pna    Arthritis-osteo Brother     Cancer Daughter         in remission    Arthritis-osteo Brother     Diabetes Brother     Arthritis-osteo Sister      Social History     Tobacco Use    Smoking status: Former Smoker     Years: 1.00     Types: Cigarettes     Last attempt to quit: 1982     Years since quittin.8    Smokeless tobacco: Never Used   Substance Use Topics    Alcohol use: No     Comment: Quit alcohol in  Review of Systems   Constitutional: Negative. HENT: Positive for congestion. Respiratory: Negative. Cardiovascular: Negative. All other systems reviewed and are negative. No flowsheet data found. Shadia Addison, who was evaluated through a patient-initiated, synchronous (real-time) audio only encounter, and/or her healthcare decision maker, is aware that it is a billable service, with coverage as determined by her insurance carrier. She provided verbal consent to proceed: n/a- consent obtained within past 12 months. She has not had a related appointment within my department in the past 7 days or scheduled within the next 24 hours.       Total Time: minutes: 11-20 minutes    Ike Hernandez MD

## 2020-10-30 NOTE — PROGRESS NOTES
Juan David Loyola presents today for   Chief Complaint   Patient presents with    Cholesterol Problem     3 month follow  up    Arthritis     3 month follow up        Juan David Loyola preferred language for health care discussion is english/other. Is someone accompanying this pt? No    Is the patient using any DME equipment during OV? No    Depression Screening:  3 most recent PHQ Screens 10/20/2020   Little interest or pleasure in doing things Not at all   Feeling down, depressed, irritable, or hopeless Not at all   Total Score PHQ 2 0       Learning Assessment:  Learning Assessment 1/27/2020   PRIMARY LEARNER Patient   HIGHEST LEVEL OF EDUCATION - PRIMARY LEARNER  DID NOT GRADUATE [address] [facility] PRIMARY LEARNER NONE   CO-LEARNER CAREGIVER No   PRIMARY LANGUAGE ENGLISH    NEED -   LEARNER PREFERENCE PRIMARY LISTENING   ANSWERED BY self   RELATIONSHIP SELF       Abuse Screening:  Abuse Screening Questionnaire 1/27/2020   Do you ever feel afraid of your partner? N   Are you in a relationship with someone who physically or mentally threatens you? N   Is it safe for you to go home? Y       Fall Risk  Fall Risk Assessment, last 12 mths 10/20/2020   Able to walk? Yes   Fall in past 12 months? No   Fall with injury? -   Number of falls in past 12 months -   Fall Risk Score -         Coordination of Care:  1. Have you been to the ER, urgent care clinic since your last visit? Hospitalized since your last visit? No    2. Have you seen or consulted any other health care providers outside of the [address] [place] [address] since your last visit? Include any pap smears or colon screening. No      Advance Directive:  1. Do you have an advance directive in place? Patient Reply:No    2. If not, would you like material regarding how to put one in place?  Patient Reply: No

## 2021-01-06 ENCOUNTER — TELEPHONE (OUTPATIENT)
Dept: FAMILY MEDICINE CLINIC | Age: 86
End: 2021-01-06

## 2021-01-06 ENCOUNTER — OFFICE VISIT (OUTPATIENT)
Dept: FAMILY MEDICINE CLINIC | Age: 86
End: 2021-01-06
Payer: MEDICAID

## 2021-01-06 VITALS
HEART RATE: 78 BPM | SYSTOLIC BLOOD PRESSURE: 151 MMHG | BODY MASS INDEX: 37.99 KG/M2 | WEIGHT: 228 LBS | RESPIRATION RATE: 16 BRPM | HEIGHT: 65 IN | TEMPERATURE: 97.3 F | OXYGEN SATURATION: 94 % | DIASTOLIC BLOOD PRESSURE: 76 MMHG

## 2021-01-06 DIAGNOSIS — R05.9 COUGH: Primary | ICD-10-CM

## 2021-01-06 DIAGNOSIS — J02.0 STREP PHARYNGITIS: ICD-10-CM

## 2021-01-06 DIAGNOSIS — R09.81 STUFFY HEAD: ICD-10-CM

## 2021-01-06 LAB
FLUAV+FLUBV AG NOSE QL IA.RAPID: NEGATIVE
FLUAV+FLUBV AG NOSE QL IA.RAPID: NEGATIVE
S PYO AG THROAT QL: POSITIVE
VALID INTERNAL CONTROL?: YES
VALID INTERNAL CONTROL?: YES

## 2021-01-06 PROCEDURE — 87880 STREP A ASSAY W/OPTIC: CPT | Performed by: NURSE PRACTITIONER

## 2021-01-06 PROCEDURE — 99213 OFFICE O/P EST LOW 20 MIN: CPT | Performed by: NURSE PRACTITIONER

## 2021-01-06 PROCEDURE — 87804 INFLUENZA ASSAY W/OPTIC: CPT | Performed by: NURSE PRACTITIONER

## 2021-01-06 RX ORDER — AZITHROMYCIN 250 MG/1
TABLET, FILM COATED ORAL
Qty: 6 TAB | Refills: 0 | Status: SHIPPED | OUTPATIENT
Start: 2021-01-06 | End: 2021-01-11

## 2021-01-06 NOTE — PROGRESS NOTES
SUBJECTIVE:  No chief complaint on file. Patient denies recent travel. Pt exposed to sick contacts under investigations for possible Covid YES. Pt is not a current smoker. OBJECTIVE    Visit Vitals  BP (!) 151/76 (BP 1 Location: Left arm, BP Patient Position: Sitting)   Pulse 78   Temp 97.3 °F (36.3 °C) (Oral)   Resp 16   Ht 5' 5\" (1.651 m)   Wt 228 lb (103.4 kg)   LMP 12/31/1985   SpO2 94%   BMI 37.94 kg/m²      General:  healthy, alert, well developed, well nourished, cooperative, pleasant and in no apparent distress. Sick but not toxic appearing. Eyes:   The lids are without swelling, lesions, or drainage. The conjunctiva is clear and noninjected. ENT:  ENT exam normal, no neck nodes or sinus tenderness and bilateral TM normal without fluid or infection. Neck: normal, supple and no adenopathy. Lungs/CV: clear to auscultation, no wheezes or rales and unlabored breathing. Heart: regular rhythm normal rate. Skin:  No rashes, no jaundice. ASSESSMENT / PLAN     ICD-10-CM ICD-9-CM    1. Cough  R05 786.2 AMB POC RAPID INFLUENZA TEST      AMB POC RAPID STREP A      NOVEL CORONAVIRUS (COVID-19)   2. Stuffy head  R09.81 478.19 AMB POC RAPID INFLUENZA TEST      AMB POC RAPID STREP A      NOVEL CORONAVIRUS (COVID-19)   3. Strep pharyngitis  J02.0 034.0 azithromycin (ZITHROMAX) 250 mg tablet       Positive for strep. Negative for flu. Results for orders placed or performed in visit on 01/06/21   AMB POC RAPID INFLUENZA TEST   Result Value Ref Range    VALID INTERNAL CONTROL POC Yes     Influenza A Ag POC Negative Negative    Influenza B Ag POC Negative Negative   AMB POC RAPID STREP A   Result Value Ref Range    VALID INTERNAL CONTROL POC Yes     Group A Strep Ag Positive Negative         Based on CDC recommendations and limited testing supplies, only those patients who meet criteria will be tested for Covid.      High priority groups for testing   Symptomatic and/or Exposure /Test for Covid  Immunocompromised host (on prednisone, biological therapy, blood cancer, metastatic cancer or active chemotherapy)   [facility] worker in the home    Other high-risk group: o age >47   o Uncontrolled DM   o Uncontrolled HTN   o BMI >40, CKD/ESRD    Dialysis patients (patients going to HD units, not asymptomatic home HD/PD)    Anyone living in a congregate setting     Non High-risk patient category           Test for COVID-19   Asymptomatic, no known exposure  No    Asymptomatic, possible exposure  No    Asymptomatic, definite exposure  Provider discretion    Symptomatic, no known exposure  Yes    Symptomatic, + exposure  Yes      Patient does  meet criteria for Covid testing. COVID-19 testing was completed. Work note was given until [employer] are available. If Covid testing was completed and is negative, patient may return back to work despite quarantine originally set in place if applicable. Instructed pt on the importance of rest, fluid intake (with avoidance of red fluids), zinc and vit C supplements. Instructed pt to check temp if possible and to take acetaminophen or NSAIDs if fevers are noted. Instructed patient to remember to wash hands, disinfect surroundings, and avoid touching face. Instructed pt to remain home and and self quarantine until Covid results are negative and all symptoms have improved or subsided. If they must leave home, wear a mask. Patient verbalized understanding. We have provided the patient with a detailed after visit summary which was reviewed, and red flag symptoms that would warrant an ER visit were emphasized. CC'd chart to PCP: Yes: Comment: MD Emir Wick, NP-C    This visit was provided as a focused evaluation during the COVID -19 pandemic/national emergency. A comprehensive review of all previous patient history and testing was not conducted. Pertinent findings were elicited during the visit.

## 2021-01-06 NOTE — TELEPHONE ENCOUNTER
Patient son called Lucero Mancera [phone] and he has tested positive for the COVID his mother Jannie Thorne has been around him for the last five days and now she is having symptoms of stuffy head, coughing, drainage. He is not sure if she is running a fever because he is now in isolation.  Please advise

## 2021-01-06 NOTE — PROGRESS NOTES
Malka Gibson presents today for No chief complaint on file. Is someone accompanying this pt? No      Is the patient using any DME equipment during OV? Yes      Travel and Exposure Screening was performed during check in or rooming process yes      Depression Screening:  3 most recent PHQ Screens 10/20/2020   Little interest or pleasure in doing things Not at all   Feeling down, depressed, irritable, or hopeless Not at all   Total Score PHQ 2 0       Fall Risk  Fall Risk Assessment, last 12 mths 10/20/2020   Able to walk? Yes   Fall in past 12 months? No   Number of falls in past 12 months -   Fall with injury?  -       This Visit Test  Results for orders placed or performed in visit on 10/20/20   NOVEL CORONAVIRUS (COVID-19)   Result Value Ref Range    SARS-CoV-2, MIKE Not Detected Not Detected   AMB POC RAPID INFLUENZA TEST   Result Value Ref Range    VALID INTERNAL CONTROL POC Yes     Influenza A Ag POC Negative Negative Pos/Neg    Influenza B Ag POC Positive Negative Pos/Neg   AMB POC RAPID STREP A   Result Value Ref Range    VALID INTERNAL CONTROL POC Yes     Group A Strep Ag Positive Negative

## 2021-01-08 LAB — SARS-COV-2, NAA: NOT DETECTED

## 2021-01-08 NOTE — PROGRESS NOTES
[facility] clinic nurses: Please notify pt their Covid test was Negative. If patients symptoms have not improved, they need to follow-up with their PCP. Remind pt to stay home but if they must leave home, take all precautions: wear a mask, continue social distancing, disinfect home/work areas, avoid touching the face, and sanitize hands frequently. If they need a return to work note, please provide. Thank you.

## 2021-01-13 ENCOUNTER — OFFICE VISIT (OUTPATIENT)
Dept: ORTHOPEDIC SURGERY | Age: 86
End: 2021-01-13
Payer: MEDICAID

## 2021-01-13 VITALS
RESPIRATION RATE: 16 BRPM | OXYGEN SATURATION: 97 % | DIASTOLIC BLOOD PRESSURE: 61 MMHG | SYSTOLIC BLOOD PRESSURE: 106 MMHG | TEMPERATURE: 98.4 F | BODY MASS INDEX: 37.99 KG/M2 | HEART RATE: 71 BPM | WEIGHT: 228 LBS | HEIGHT: 65 IN

## 2021-01-13 DIAGNOSIS — M25.411 EFFUSION OF RIGHT SHOULDER: ICD-10-CM

## 2021-01-13 DIAGNOSIS — M19.011 PRIMARY OSTEOARTHRITIS, RIGHT SHOULDER: Primary | ICD-10-CM

## 2021-01-13 DIAGNOSIS — G89.29 CHRONIC RIGHT SHOULDER PAIN: ICD-10-CM

## 2021-01-13 DIAGNOSIS — M25.511 CHRONIC RIGHT SHOULDER PAIN: ICD-10-CM

## 2021-01-13 PROCEDURE — 20610 DRAIN/INJ JOINT/BURSA W/O US: CPT | Performed by: SPECIALIST

## 2021-01-13 PROCEDURE — 99213 OFFICE O/P EST LOW 20 MIN: CPT | Performed by: SPECIALIST

## 2021-01-13 RX ORDER — DICLOFENAC SODIUM 10 MG/G
4 GEL TOPICAL 4 TIMES DAILY
Qty: 5 EACH | Refills: 5 | Status: SHIPPED | OUTPATIENT
Start: 2021-01-13 | End: 2022-02-04 | Stop reason: SDUPTHER

## 2021-01-13 RX ORDER — BETAMETHASONE SODIUM PHOSPHATE AND BETAMETHASONE ACETATE 3; 3 MG/ML; MG/ML
3 INJECTION, SUSPENSION INTRA-ARTICULAR; INTRALESIONAL; INTRAMUSCULAR; SOFT TISSUE ONCE
Status: COMPLETED | OUTPATIENT
Start: 2021-01-13 | End: 2021-01-13

## 2021-01-13 RX ADMIN — BETAMETHASONE SODIUM PHOSPHATE AND BETAMETHASONE ACETATE 3 MG: 3; 3 INJECTION, SUSPENSION INTRA-ARTICULAR; INTRALESIONAL; INTRAMUSCULAR; SOFT TISSUE at 08:58

## 2021-01-13 NOTE — PROGRESS NOTES
Patient: Iwona Mccord                MRN: [id number]       SSN: [id number]  YOB: 1935        AGE: 82 y.o. SEX: female    Iris Boothe MD   01/13/21    CC: RIGHT SHOULDER PAIN AND EFFUSION    HISTORY:  Iwona Mccord is a 82 y.o. female who is seen for right shoulder pain and swelling. She does not recall any injury. She has been experiencing right shoulder pain for the past several months. Her shoulder pain prevents her from sleeping well at night. Voltaren Gel helps. She takes Celebrex. She was previously seen for left shoulder pain. She responded well to a cortisone injection and aspiration at last ov, but pain has returned. She has been experiencing left shoulder pain for the past several years. She does not recall any injury. She feels shoulder pain with overhead activities and at night. She has noticed the return of fluctuant left shoulder swelling recently. She is not interested in shoulder surgery at this time. She was previously seen for left little finger fracture--doing well. She sustained a left 5th finger injury on 3/14/19 when she fell off her bed while trying to pull up her pants. She toppled forward off of the bed and hit her left hand on the floor. Her fingers are numb. She has a h/o osteoporosis. Pain Assessment  1/13/2021   Location of Pain Shoulder   Location Modifiers Right   Severity of Pain 10   Quality of Pain Sharp   Duration of Pain -   Frequency of Pain Intermittent   Date Pain First Started -   Aggravating Factors (No Data)   Aggravating Factors Comment lift   Limiting Behavior No   Relieving Factors (No Data)   Relieving Factors Comment voltern   Result of Injury No   Work-Related Injury -   Type of Injury -     Occupation, etc:  Ms. Gildardo Carcamo previously worked as a . She lives in [place] with her son and younger brother. She is fairly independent despite her disabilities. She does her own cooking, cleaning, and laundry.  She wants to remain independent to keep her strength and stay alive. She has 2 other sons, 2 daughters and 9 grandchildren. She does not take any blood thinners. Ms. Fabiola Culp weighs 228 lbs and is 5'5\" tall. Weight Metrics 1/13/2021 1/6/2021 9/30/2020 7/27/2020 4/27/2020 3/11/2020 1/27/2020   Weight 228 lb 228 lb 228 lb 228 lb 217 lb 12.8 oz 230 lb 6.4 oz 227 lb   BMI 37.94 kg/m2 37.94 kg/m2 37.94 kg/m2 37.94 kg/m2 36.24 kg/m2 38.34 kg/m2 37.77 kg/m2     Patient Active Problem List   Diagnosis Code    High vitamin D level E67.3    Dyslipidemia E78.5    Varicose veins I83.90    Osteoarthritis M19.90    Osteoporosis M81.0    Status post right hip replacement Z96.641    Impaired mobility and ADLs Z74.09, Z78.9    Peripheral neuropathy G62.9    Peripheral vascular disease (HCC) I73.9    Postoperative anemia due to acute blood loss D62    Osteoarthritis of right hip M16.11    Decreased calculated GFR R94.4    History of kidney stones Z87.442    Numbness in both hands R20.0    Small bowel obstruction (HCC) K56.609    Hypercholesterolemia E78.00    Fx intertrochanteric hip (HCC) S72.143A    Muscle weakness (generalized) M62.81    Difficulty in walking, not elsewhere classified R26.2    ACP (advance care planning) Z71.89    Severe obesity (BMI 35.0-39. 9) E66.01    Age-related cataract of both eyes H25.9     REVIEW OF SYSTEMS:    Constitutional Symptoms: Negative   Eyes: Negative   Ears, Nose, Throat and Mouth: Negative   Cardiovascular: Negative   Respiratory: Negative   Genitourinary: Per HPI   Gastrointestinal: Per HPI   Integumentary (Skin and/or Breast): Negative   Musculoskeletal: Per HPI   Endocrine/Rheumatologic: Negative   Neurological: Per HPI   Hematology/Lymphatic: Negative    Allergic/Immunologic: Negative   Phychiatric: Negative    Social History     Socioeconomic History    Marital status:      Spouse name: Not on file    Number of children: Not on file    Years of education: Not on file  Highest education level: Not on file   Occupational History    Not on file   Social Needs    Financial resource strain: Not on file    Food insecurity     Worry: Not on file     Inability: Not on file    Transportation needs     Medical: Not on file     Non-medical: Not on file   Tobacco Use    Smoking status: Former Smoker     Years: 1.00     Types: Cigarettes     Quit date: 1982     Years since quittin.0    Smokeless tobacco: Never Used   Substance and Sexual Activity    Alcohol use: No     Comment: Quit alcohol in     Drug use: Yes     Types: Prescription, OTC     Comment: prescribed    Sexual activity: Never   Lifestyle    Physical activity     Days per week: Not on file     Minutes per session: Not on file    Stress: Not on file   Relationships    Social connections     Talks on phone: Not on file     Gets together: Not on file     Attends Confucianist service: Not on file     Active member of club or organization: Not on file     Attends meetings of clubs or organizations: Not on file     Relationship status: Not on file    Intimate partner violence     Fear of current or ex partner: Not on file     Emotionally abused: Not on file     Physically abused: Not on file     Forced sexual activity: Not on file   Other Topics Concern    Not on file   Social History Narrative    Not on file       No Known Allergies    Current Outpatient Medications   Medication Sig    celecoxib (CELEBREX) 200 mg capsule TAKE 1 CAPSULE BY MOUTH TWICE DAILY AS NEEDED FOR PAIN    fluticasone propionate (FLONASE) 50 mcg/actuation nasal spray USE 2 SPRAYS IN BOTH NOSTRILS DAILY    furosemide (LASIX) 20 mg tablet TAKE ONE TABLET BY MOUTH DAILY as needed FOR EDEMA    atorvastatin (LIPITOR) 40 mg tablet Take 1 Tab by mouth daily.  miscellaneous medical supply misc DISPENSE (1) BEDSIDE COMMODE. DX:M15.9,Z74.09,Z96.641,M62.81,Z91.81    VITAMIN D3 1,000 unit tablet     gabapentin (NEURONTIN) 300 mg capsule Take 300 mg by mouth three (3) times daily.  cyanocobalamin (VITAMIN B-12) 500 mcg tablet Take 500 mcg by mouth daily. No current facility-administered medications for this visit.         PHYSICAL EXAMINATION:  Visit Vitals  /61 (BP 1 Location: Left arm, BP Patient Position: Sitting)   Pulse 71   Temp 98.4 °F (36.9 °C) (Temporal)   Resp 16   Ht 5' 5\" (1.651 m)   Wt 228 lb (103.4 kg)   LMP 12/31/1985   SpO2 97%   BMI 37.94 kg/m²       ORTHO EXAMINATION:  Examination Right shoulder Left shoulder   Skin Intact Intact   Effusion +++ ++   Biceps deformity - -   Atrophy - -   AC joint tenderness - -   Acromial tenderness + +   Biceps tenderness - -   Forward flexion/Elevation  120   Active abduction  120   External rotation ROM 30 30   Internal rotation ROM 90 90   Apprehension - -   Impingement - -   Drop Arm Test - -   Neurovascular Intact Intact      Examination Right Hand Left Hand   Skin Intact Intact   Deformity - -   Swelling - -   Tenderness - -   Finger flexion Full Full   Finger extension Full Full   Sensation Normal Normal   Capillary refill Normal Normal   Heberden's nodes - -   Dupuytren's - -   Enlargement of left 5th PIP joint with limited extension  Using Rollator walker    TIME OUT:  Chart reviewed for the following:   Ted Earl MD, have reviewed the History, Physical and updated the Allergic reactions for [address] performed immediately prior to start of procedure:  Ted Earl MD, have performed the following reviews on Hector Sales prior to the start of the procedure:          * Patient was identified by name and date of birth   * Agreement on procedure being performed was verified  * Risks and Benefits explained to the patient  * Procedure site verified and marked as necessary  * Patient was positioned for comfort  * Consent was obtained     Time: 8:52 AM     Date of procedure: 1/13/2021  Procedure performed by:  Martha Emmanuel MD  Ms. Sal tolerated the procedure well with no complications. RADIOGRAPHS:  XR LEFT SHOULDER 9/18/17 DANYEL  IMPRESSION:  Three views - No fractures, no acromioclavicular narrowing, severe glenohumeral narrowing, + calcific densities, rotator cuff arthropathy    XR LEFT FINGERS 4/27/20 DANYEL  IMPRESSION:  Three views - impacted proximal 5th phalanx intraarticular fracture with minimal displacement and early callus formation, no degenerative changes, osteopenia    XR LEFT HAND 3/14/19 VELOCITY  -I have independently reviewed these images during this office visit. -Dr. Jia Multani:  Three views - impacted proximal 5th phalanx fracture with 30 degree apex volar angulation at fracture site, no joint space narrowing, osteoporosis. IMPRESSION:      ICD-10-CM ICD-9-CM    1. Primary osteoarthritis, right shoulder  M19.011 715.11 betamethasone (CELESTONE) injection 3 mg      DRAIN/INJECT LARGE JOINT/BURSA      diclofenac (Voltaren) 1 % gel   2. Chronic right shoulder pain  M25.511 719.41     G89.29 338.29    3. Effusion of right shoulder  M25.411 719.01        PLAN: After timeout and under sterile conditions, right shoulder aspirated 35 cc of bloody fluid. The fluid was discarded. After discussing treatment options, patient's right shoulder was injected with 4 cc Marcaine and 1/2 cc Celestone. We discussed possible need for left reverse shoulder arthroplasty at some time in the future if pain continues. She will follow up as needed.     Scribed by Solis Rolle ([address]) as dictated by Arsenio Hayden MD

## 2021-02-01 ENCOUNTER — VIRTUAL VISIT (OUTPATIENT)
Dept: FAMILY MEDICINE CLINIC | Age: 86
End: 2021-02-01
Payer: MEDICAID

## 2021-02-01 DIAGNOSIS — M15.9 PRIMARY OSTEOARTHRITIS INVOLVING MULTIPLE JOINTS: ICD-10-CM

## 2021-02-01 DIAGNOSIS — Z71.85 VACCINE COUNSELING: ICD-10-CM

## 2021-02-01 DIAGNOSIS — E78.00 HYPERCHOLESTEROLEMIA: Primary | ICD-10-CM

## 2021-02-01 DIAGNOSIS — E66.01 SEVERE OBESITY WITH BODY MASS INDEX (BMI) OF 35.0 TO 39.9 WITH SERIOUS COMORBIDITY (HCC): ICD-10-CM

## 2021-02-01 DIAGNOSIS — E78.00 HYPERCHOLESTEROLEMIA: ICD-10-CM

## 2021-02-01 PROCEDURE — 99442 PR PHYS/QHP TELEPHONE EVALUATION 11-20 MIN: CPT | Performed by: FAMILY MEDICINE

## 2021-02-01 RX ORDER — ATORVASTATIN CALCIUM 40 MG/1
40 TABLET, FILM COATED ORAL DAILY
Qty: 30 TAB | Refills: 12 | Status: SHIPPED | OUTPATIENT
Start: 2021-02-01 | End: 2022-02-04 | Stop reason: SDUPTHER

## 2021-02-01 NOTE — PROGRESS NOTES
Victorino Lane presents today for   Chief Complaint   Patient presents with    Cholesterol Problem    Labs       Virtual/telephone visit    Depression Screening:  3 most recent PHQ Screens 2/1/2021   Little interest or pleasure in doing things Not at all   Feeling down, depressed, irritable, or hopeless Not at all   Total Score PHQ 2 0       Learning Assessment:  Learning Assessment 1/27/2020   PRIMARY LEARNER Patient   HIGHEST LEVEL OF EDUCATION - PRIMARY LEARNER  DID NOT GRADUATE [facility] PRIMARY LEARNER NONE   CO-LEARNER CAREGIVER No   PRIMARY LANGUAGE ENGLISH    NEED -   LEARNER PREFERENCE PRIMARY LISTENING   ANSWERED BY self   RELATIONSHIP SELF       Fall Risk  Fall Risk Assessment, last 12 mths 2/1/2021   Able to walk? Yes   Fall in past 12 months? 1   Do you feel unsteady? 1   Are you worried about falling 1   Is the gait abnormal? 1   Number of falls in past 12 months -   Fall with injury? 0       Travel Screening:   Travel Screening     Question   Response    In the last month, have you been in contact with someone who was confirmed or suspected to have Coronavirus / COVID-19? No / Unsure    Have you had a COVID-19 viral test in the last 14 days? No    Do you have any of the following new or worsening symptoms? None of these    Have you traveled internationally in the last month? No      Travel History   Travel since 01/01/21     No documented travel since 01/01/21          Health Maintenance reviewed and discussed and ordered per Provider. Health Maintenance Due   Topic Date Due    COVID-19 Vaccine (1 of 2) 08/30/1951    Shingrix Vaccine Age 50> (1 of 2) 08/30/1987    Flu Vaccine (1) 09/01/2020    GLAUCOMA SCREENING Q2Y  10/08/2020   . Coordination of Care:  1. Have you been to the ER, urgent care clinic since your last visit? Hospitalized since your last visit? No    2.  Have you seen or consulted any other health care providers outside of the [address] [place] [address] System since your last visit? Include any pap smears or colon screening.  No

## 2021-02-01 NOTE — PROGRESS NOTES
Kristan White is a 82 y.o. female, evaluated via audio-only technology on 2/1/2021 for Cholesterol Problem and Labs  . Assessment & Plan:   Diagnoses and all orders for this visit:    1. Hypercholesterolemia  -     METABOLIC PANEL, COMPREHENSIVE; Future  -     LIPID PANEL; Future  -     atorvastatin (LIPITOR) 40 mg tablet; Take 1 Tab by mouth daily. 2. Primary osteoarthritis involving multiple joints  Using celebrex and diclofenac get with good results. 3. Severe obesity with body mass index (BMI) of 35.0 to 39.9 with serious comorbidity ([address]  Unable to exercise much due to physical limitations. 4. Vaccine counseling  Discussed pt's concerns. Recommend covid vaccination when available. The complexity of medical decision making for this visit is moderate      Follow-up and Dispositions    · Return in about 3 months (around 5/1/2021) for high cholesterol. 12  Subjective:   Pt reports that she is \"coming along. \"   She is having pain in her shoulders due to oa but remains uninterested in surgery. Pt has had strep throat again per testing. She reports that she is feeling ok. She does not think she had any sx. Pt does want to get a covid vaccination. She has some concerns because of her shoulder pain. Pt did not get her labs done. She will go soon. Pt no longer has any issues with the varicose veins in her legs. She is not on plavix. She is not seeing vasc surg. Pt walks with her walker but worries about falling if she does not have it. She is not able to exercise. Prior to Admission medications    Medication Sig Start Date End Date Taking? Authorizing Provider   atorvastatin (LIPITOR) 40 mg tablet Take 1 Tab by mouth daily. 2/1/21  Yes Tiffanie Reed MD   diclofenac (Voltaren) 1 % gel Apply 4 g to affected area four (4) times daily.  1/13/21  Yes Sahara Alfaro MD   celecoxib (CELEBREX) 200 mg capsule TAKE 1 CAPSULE BY MOUTH TWICE DAILY AS NEEDED FOR PAIN 11/10/20  Yes Víctor Ramirez MD   fluticasone propionate (FLONASE) 50 mcg/actuation nasal spray USE 2 SPRAYS IN BOTH NOSTRILS DAILY 10/30/20  Yes Víctor Ramirez MD   furosemide (LASIX) 20 mg tablet TAKE ONE TABLET BY MOUTH DAILY as needed FOR EDEMA 7/29/20  Yes Iris Boothe MD   VITAMIN D3 1,000 unit tablet  8/2/16  Yes Provider, Historical   gabapentin (NEURONTIN) 300 mg capsule Take 300 mg by mouth three (3) times daily. Yes Provider, Historical   cyanocobalamin (VITAMIN B-12) 500 mcg tablet Take 500 mcg by mouth daily. Yes Provider, Historical   miscellaneous medical supply misc DISPENSE (1) BEDSIDE COMMODE. DX:M15.9,Z74.09,Z96.641,M62.81,Z91.81 4/5/17   Iris Boothe MD     Patient Active Problem List   Diagnosis Code    High vitamin D level E67.3    Dyslipidemia E78.5    Varicose veins I83.90    Osteoarthritis M19.90    Osteoporosis M81.0    Status post right hip replacement Z96.641    Impaired mobility and ADLs Z74.09, Z78.9    Peripheral neuropathy G62.9    Postoperative anemia due to acute blood loss D62    Osteoarthritis of right hip M16.11    Decreased calculated GFR R94.4    History of kidney stones Z87.442    Numbness in both hands R20.0    Small bowel obstruction (HCC) K56.609    Hypercholesterolemia E78.00    Fx intertrochanteric hip (HCC) S72.143A    Muscle weakness (generalized) M62.81    Difficulty in walking, not elsewhere classified R26.2    ACP (advance care planning) Z71.89    Severe obesity (BMI 35.0-39. 9) E66.01    Age-related cataract of both eyes H25.9     Current Outpatient Medications   Medication Sig Dispense Refill    atorvastatin (LIPITOR) 40 mg tablet Take 1 Tab by mouth daily. 30 Tab 12    diclofenac (Voltaren) 1 % gel Apply 4 g to affected area four (4) times daily.  5 Each 5    celecoxib (CELEBREX) 200 mg capsule TAKE 1 CAPSULE BY MOUTH TWICE DAILY AS NEEDED FOR PAIN 60 Cap 5    fluticasone propionate (FLONASE) 50 mcg/actuation nasal spray USE 2 SPRAYS IN BOTH NOSTRILS DAILY 16 g 12    furosemide (LASIX) 20 mg tablet TAKE ONE TABLET BY MOUTH DAILY as needed FOR EDEMA 30 Tab 12    VITAMIN D3 1,000 unit tablet       gabapentin (NEURONTIN) 300 mg capsule Take 300 mg by mouth three (3) times daily.  cyanocobalamin (VITAMIN B-12) 500 mcg tablet Take 500 mcg by mouth daily.  miscellaneous medical supply misc DISPENSE (1) BEDSIDE COMMODE. DX:M15.9,Z74.09,Z96.641,M62.81,Z91.81 1 Each 0     No Known Allergies  Past Medical History:   Diagnosis Date    Decreased calculated GFR 12/5/2014    High vitamin D level 12/6/2014    Vitamin D 25-Hydroxy (12/06/2014) = 138.9     History of echocardiogram 08/29/2014    EF 60%. No WMA. Mild conc LVH. Gr 1 DDfx. Mild RVE. Mild CATRACHO. Sm right & sm left pleural effusion.  Hypercholesterolemia     Lower extremity venous duplex 10/02/2014    No DVT bilaterally.     Numbness in both hands     Osteoarthritis     Osteoarthritis of right hip     Osteoporosis     Peripheral neuropathy     Peripheral vascular disease (HCC)     Postoperative anemia due to acute blood loss     Vaginal fibroids     resolved s/p hyst    Varicose veins      Past Surgical History:   Procedure Laterality Date    HX CHOLECYSTECTOMY      HX HERNIA REPAIR  2010    abdominal, had 2nd surgery for infection    HX HIP FRACTURE TX Left     HX HIP REPLACEMENT Right 12/02/2014    S/P Right total hip replacement (12/02/2014 - Dr. Jefe Carmichael)   [facility] HX OTHER SURGICAL  03/07/2018    Left foot toe surgery-DANA VARNER @ 1 foot 2 foot    CA TOTAL KNEE ARTHROPLASTY  2006    left    CA TOTAL KNEE ARTHROPLASTY  2001    right    VASCULAR SURGERY PROCEDURE UNLIST      Bilateral leg vein stripping     Family History   Problem Relation Age of Onset   [de-identified] Arthritis-rheumatoid Mother     Heart Attack Mother     Arthritis-rheumatoid Father     Other Father         gangrene   [de-identified] Arthritis-osteo Sister     Other Brother         pna    Arthritis-osteo Brother     Cancer Daughter         in remission    Arthritis-osteo Brother     Diabetes Brother    [facility] Arthritis-osteo Sister      Social History     Tobacco Use    Smoking status: Former Smoker     Years: 1.00     Types: Cigarettes     Quit date: 1982     Years since quittin.1    Smokeless tobacco: Never Used   Substance Use Topics    Alcohol use: No     Comment: Quit alcohol in        Review of Systems   Constitutional: Negative. HENT: Negative. Respiratory: Negative. Cardiovascular: Negative. Musculoskeletal: Positive for joint pain. All other systems reviewed and are negative. No flowsheet data found. Esha Man, who was evaluated through a patient-initiated, synchronous (real-time) audio only encounter, and/or her healthcare decision maker, is aware that it is a billable service, with coverage as determined by her insurance carrier. She provided verbal consent to proceed: n/a- consent obtained within past 12 months. She has not had a related appointment within my department in the past 7 days or scheduled within the next 24 hours.       Total Time: minutes: 11-20 minutes    Rafa Robb MD

## 2021-03-04 ENCOUNTER — TELEPHONE (OUTPATIENT)
Dept: FAMILY MEDICINE CLINIC | Age: 86
End: 2021-03-04

## 2021-03-04 NOTE — TELEPHONE ENCOUNTER
Obtained prior auth for celecoxib via CoverMy Meds:        [facility] DR NIRALI JUNIOR notified, patient notified

## 2021-04-03 ENCOUNTER — HOSPITAL ENCOUNTER (OUTPATIENT)
Dept: LAB | Age: 86
Discharge: HOME | End: 2021-04-03

## 2021-04-03 LAB — XX-LABCORP SPECIMEN COL,LCBCF: NORMAL

## 2021-04-03 PROCEDURE — 99001 SPECIMEN HANDLING PT-LAB: CPT

## 2021-04-04 LAB
ALBUMIN SERPL-MCNC: 4.1 G/DL (ref 3.6–4.6)
ALBUMIN/GLOB SERPL: 1.5 {RATIO} (ref 1.2–2.2)
ALP SERPL-CCNC: 129 IU/L (ref 39–117)
ALT SERPL-CCNC: 9 IU/L (ref 0–32)
AST SERPL-CCNC: 15 IU/L (ref 0–40)
BILIRUB SERPL-MCNC: 0.5 MG/DL (ref 0–1.2)
BUN SERPL-MCNC: 15 MG/DL (ref 8–27)
BUN/CREAT SERPL: 18 (ref 12–28)
CALCIUM SERPL-MCNC: 9.4 MG/DL (ref 8.7–10.3)
CHLORIDE SERPL-SCNC: 105 MMOL/L (ref 96–106)
CHOLEST SERPL-MCNC: 214 MG/DL (ref 100–199)
CO2 SERPL-SCNC: 24 MMOL/L (ref 20–29)
CREAT SERPL-MCNC: 0.84 MG/DL (ref 0.57–1)
GLOBULIN SER CALC-MCNC: 2.7 G/DL (ref 1.5–4.5)
GLUCOSE SERPL-MCNC: 88 MG/DL (ref 65–99)
HDLC SERPL-MCNC: 63 MG/DL
IMP & REVIEW OF LAB RESULTS: NORMAL
LDLC SERPL CALC-MCNC: 137 MG/DL (ref 0–99)
POTASSIUM SERPL-SCNC: 4.6 MMOL/L (ref 3.5–5.2)
PROT SERPL-MCNC: 6.8 G/DL (ref 6–8.5)
SODIUM SERPL-SCNC: 141 MMOL/L (ref 134–144)
SPECIMEN STATUS REPORT, ROLRST: NORMAL
TRIGL SERPL-MCNC: 77 MG/DL (ref 0–149)
VLDLC SERPL CALC-MCNC: 14 MG/DL (ref 5–40)

## 2021-04-20 ENCOUNTER — TELEPHONE (OUTPATIENT)
Dept: FAMILY MEDICINE CLINIC | Age: 86
End: 2021-04-20

## 2021-04-20 NOTE — TELEPHONE ENCOUNTER
Pt son called and stated his mom has a bruise on her shoulder and was wondering what can be done about it.  Please advise

## 2021-04-30 ENCOUNTER — OFFICE VISIT (OUTPATIENT)
Dept: ORTHOPEDIC SURGERY | Age: 86
End: 2021-04-30
Payer: MEDICAID

## 2021-04-30 VITALS
HEART RATE: 67 BPM | WEIGHT: 234.2 LBS | BODY MASS INDEX: 39.02 KG/M2 | OXYGEN SATURATION: 99 % | RESPIRATION RATE: 16 BRPM | HEIGHT: 65 IN | TEMPERATURE: 98.6 F

## 2021-04-30 DIAGNOSIS — M25.511 CHRONIC RIGHT SHOULDER PAIN: Primary | ICD-10-CM

## 2021-04-30 DIAGNOSIS — M25.411 EFFUSION OF RIGHT SHOULDER: ICD-10-CM

## 2021-04-30 DIAGNOSIS — M19.011 PRIMARY OSTEOARTHRITIS, RIGHT SHOULDER: ICD-10-CM

## 2021-04-30 DIAGNOSIS — G89.29 CHRONIC RIGHT SHOULDER PAIN: Primary | ICD-10-CM

## 2021-04-30 PROCEDURE — 99213 OFFICE O/P EST LOW 20 MIN: CPT | Performed by: SPECIALIST

## 2021-04-30 NOTE — PROGRESS NOTES
Patient: Tish Ruano                MRN: [id number]       SSN: [id number]  YOB: 1935        AGE: 82 y.o. SEX: female    Ashish Sierra MD   04/30/21    Chief Complaint   Patient presents with    Shoulder Pain     right shoulder pain       HISTORY:  Tish Ruano is a 82 y.o. female who is seen for continued right shoulder pain and swelling. She responded to her hemarthrosis aspiration and injection last ov. Her son recently noticed slight discoloration and warmth that spread over her shoulder. She had a recent fall. She has been experiencing right shoulder pain for the past several months. Her shoulder pain prevents her from sleeping well at night. She was previously seen for left shoulder pain. She responded well to a cortisone injection and aspiration at last ov, but pain has returned. She has been experiencing left shoulder pain for the past several years. She does not recall any injury. She feels shoulder pain with overhead activities and at night. She has noticed the return of fluctuant left shoulder swelling recently. She is not interested in shoulder surgery at this time. She was previously seen for left little finger fracture--doing well. She sustained a left 5th finger injury on 3/14/19 when she fell off her bed while trying to pull up her pants. She toppled forward off of the bed and hit her left hand on the floor. Her fingers are numb. She has a h/o osteoporosis. Pain Assessment  4/30/2021   Location of Pain Shoulder   Location Modifiers Right   Severity of Pain 7   Quality of Pain Throbbing   Duration of Pain A few hours   Frequency of Pain Intermittent   Date Pain First Started -   Aggravating Factors (No Data)   Aggravating Factors Comment ROM   Limiting Behavior -   Relieving Factors NSAID   Relieving Factors Comment -   Result of Injury No   Work-Related Injury -   Type of Injury -     Occupation, etc:  Ms. Corey Barr previously worked as a .  She lives in [place] with her son and younger brother. She is fairly independent despite her disabilities. She does her own cooking, cleaning, and laundry. She wants to remain independent to keep her strength and stay alive. She has 2 other sons, 2 daughters and 9 grandchildren. She does not take any blood thinners. Ms. John Saucedo weighs 234 lbs and is 5'5\" tall. Weight Metrics 4/30/2021 1/13/2021 1/6/2021 9/30/2020 7/27/2020 4/27/2020 3/11/2020   Weight 234 lb 3.2 oz 228 lb 228 lb 228 lb 228 lb 217 lb 12.8 oz 230 lb 6.4 oz   BMI 38.97 kg/m2 37.94 kg/m2 37.94 kg/m2 37.94 kg/m2 37.94 kg/m2 36.24 kg/m2 38.34 kg/m2     Patient Active Problem List   Diagnosis Code    High vitamin D level E67.3    Dyslipidemia E78.5    Varicose veins I83.90    Osteoarthritis M19.90    Osteoporosis M81.0    Status post right hip replacement Z96.641    Impaired mobility and ADLs Z74.09, Z78.9    Peripheral neuropathy G62.9    Postoperative anemia due to acute blood loss D62    Osteoarthritis of right hip M16.11    Decreased calculated GFR R94.4    History of kidney stones Z87.442    Numbness in both hands R20.0    Small bowel obstruction (HCC) K56.609    Hypercholesterolemia E78.00    Fx intertrochanteric hip (HCC) S72.143A    Muscle weakness (generalized) M62.81    Difficulty in walking, not elsewhere classified R26.2    ACP (advance care planning) Z71.89    Severe obesity (BMI 35.0-39. 9) E66.01    Age-related cataract of both eyes H25.9     REVIEW OF SYSTEMS:    Constitutional Symptoms: Negative   Eyes: Negative   Ears, Nose, Throat and Mouth: Negative   Cardiovascular: Negative   Respiratory: Negative   Genitourinary: Per HPI   Gastrointestinal: Per HPI   Integumentary (Skin and/or Breast): Negative   Musculoskeletal: Per HPI   Endocrine/Rheumatologic: Negative   Neurological: Per HPI   Hematology/Lymphatic: Negative    Allergic/Immunologic: Negative   Phychiatric: Negative    Social History     Socioeconomic History  Marital status:      Spouse name: Not on file    Number of children: Not on file    Years of education: Not on file    Highest education level: Not on file   Occupational History    Not on file   Social Needs    Financial resource strain: Not on file    Food insecurity     Worry: Not on file     Inability: Not on file    Transportation needs     Medical: Not on file     Non-medical: Not on file   Tobacco Use    Smoking status: Former Smoker     Years: 1.00     Types: Cigarettes     Quit date: 1982     Years since quittin.3    Smokeless tobacco: Never Used   Substance and Sexual Activity    Alcohol use: No     Comment: Quit alcohol in     Drug use: Yes     Types: Prescription, OTC     Comment: prescribed    Sexual activity: Never   Lifestyle    Physical activity     Days per week: Not on file     Minutes per session: Not on file    Stress: Not on file   Relationships    Social connections     Talks on phone: Not on file     Gets together: Not on file     Attends [employer] service: Not on file     Active member of club or organization: Not on file     Attends meetings of clubs or organizations: Not on file     Relationship status: Not on file    Intimate partner violence     Fear of current or ex partner: Not on file     Emotionally abused: Not on file     Physically abused: Not on file     Forced sexual activity: Not on file   Other Topics Concern    Not on file   Social History Narrative    Not on file       No Known Allergies    Current Outpatient Medications   Medication Sig    atorvastatin (LIPITOR) 40 mg tablet Take 1 Tab by mouth daily.  diclofenac (Voltaren) 1 % gel Apply 4 g to affected area four (4) times daily.     celecoxib (CELEBREX) 200 mg capsule TAKE 1 CAPSULE BY MOUTH TWICE DAILY AS NEEDED FOR PAIN    fluticasone propionate (FLONASE) 50 mcg/actuation nasal spray USE 2 SPRAYS IN BOTH NOSTRILS DAILY    furosemide (LASIX) 20 mg tablet TAKE ONE TABLET BY MOUTH DAILY as needed FOR EDEMA    miscellaneous medical supply misc DISPENSE (1) BEDSIDE COMMODE. DX:M15.9,Z74.09,Z96.641,M62.81,Z91.81    VITAMIN D3 1,000 unit tablet     gabapentin (NEURONTIN) 300 mg capsule Take 300 mg by mouth three (3) times daily.  cyanocobalamin (VITAMIN B-12) 500 mcg tablet Take 500 mcg by mouth daily. No current facility-administered medications for this visit.         PHYSICAL EXAMINATION:  Visit Vitals  Pulse 67   Temp 98.6 °F (37 °C) (Temporal)   Resp 16   Ht 5' 5\" (1.651 m)   Wt 234 lb 3.2 oz (106.2 kg)   LMP 12/31/1985   SpO2 99%   BMI 38.97 kg/m²       ORTHO EXAMINATION:  Examination Right shoulder Left shoulder   Skin Intact Intact   Effusion +++ ++   Biceps deformity - -   Atrophy - -   AC joint tenderness - -   Acromial tenderness + +   Biceps tenderness - -   Forward flexion/Elevation  120   Active abduction  120   External rotation ROM 30 30   Internal rotation ROM 90 90   Apprehension - -   Impingement - -   Drop Arm Test - -   Neurovascular Intact Intact      Examination Right Hand Left Hand   Skin Intact Intact   Deformity - -   Swelling - -   Tenderness - -   Finger flexion Full Full   Finger extension Full Full   Sensation Normal Normal   Capillary refill Normal Normal   Heberden's nodes - -   Dupuytren's - -   Enlargement of left 5th PIP joint with limited extension  Using Rollator walker      RADIOGRAPHS:  XR RIGHT SHOULDER 3/  IMPRESSION:     XR LEFT SHOULDER 9/18/17 DANYEL  IMPRESSION:  Three views - No fractures, no acromioclavicular narrowing, severe glenohumeral narrowing, + calcific densities, rotator cuff arthropathy    XR LEFT FINGERS 4/27/20 DANYEL  IMPRESSION:  Three views - impacted proximal 5th phalanx intraarticular fracture with minimal displacement and early callus formation, no degenerative changes, osteopenia    XR LEFT HAND 3/14/19 VELOCITY  -I have independently reviewed these images during this office visit. - Juan Manuel  IMPRESSION:  Three views - impacted proximal 5th phalanx fracture with 30 degree apex volar angulation at fracture site, no joint space narrowing, osteoporosis. IMPRESSION:      ICD-10-CM ICD-9-CM    1. Chronic right shoulder pain  M25.511 719.41     G89.29 338.29    2. Primary osteoarthritis, right shoulder  M19.011 715.11    3. Effusion of right shoulder  M25.411 719.01        PLAN: We discussed possible need for left reverse shoulder arthroplasty at some time in the future if pain continues.  She will follow up with Dr. Roderick Vizcarra since she has seen him for her shoulder problem in the past.     Scribed by [facility]) as dictated by Alex Restrepo MD

## 2021-04-30 NOTE — LETTER
4/30/2021 Patient: Geri Harris YOB: 1935 Date of Visit: 4/30/2021 Tessa Biggs MD 
[address] [id number] [place] [phone] [address] [employer] Dear Tessa Biggs MD, Thank you for referring Ms. Francella Phalen to [address] [place] [address] for evaluation. My notes for this consultation are attached. If you have questions, please do not hesitate to call me. I look forward to following your patient along with you.  
 
 
Sincerely, 
 
Maged Leavitt MD

## 2021-05-05 ENCOUNTER — VIRTUAL VISIT (OUTPATIENT)
Dept: FAMILY MEDICINE CLINIC | Age: 86
End: 2021-05-05
Payer: MEDICAID

## 2021-05-05 DIAGNOSIS — M19.011 PRIMARY OSTEOARTHRITIS OF RIGHT SHOULDER: ICD-10-CM

## 2021-05-05 DIAGNOSIS — E78.00 HYPERCHOLESTEROLEMIA: ICD-10-CM

## 2021-05-05 DIAGNOSIS — H91.91 DECREASED HEARING OF RIGHT EAR: Primary | ICD-10-CM

## 2021-05-05 PROCEDURE — 99212 OFFICE O/P EST SF 10 MIN: CPT | Performed by: FAMILY MEDICINE

## 2021-05-05 NOTE — PROGRESS NOTES
Damian Kent presents today for   Chief Complaint   Patient presents with    Cholesterol Problem       Virtual/telephone visit    Depression Screening:  3 most recent PHQ Screens 2/1/2021   Little interest or pleasure in doing things Not at all   Feeling down, depressed, irritable, or hopeless Not at all   Total Score PHQ 2 0       Learning Assessment:  Learning Assessment 1/27/2020   PRIMARY LEARNER Patient   HIGHEST LEVEL OF EDUCATION - PRIMARY LEARNER  DID NOT GRADUATE [facility] PRIMARY LEARNER NONE   CO-LEARNER CAREGIVER No   PRIMARY LANGUAGE ENGLISH    NEED -   LEARNER PREFERENCE PRIMARY LISTENING   ANSWERED BY self   RELATIONSHIP SELF       Fall Risk  Fall Risk Assessment, last 12 mths 2/1/2021   Able to walk? Yes   Fall in past 12 months? 1   Do you feel unsteady? 1   Are you worried about falling 1   Is the gait abnormal? 1   Number of falls in past 12 months -   Fall with injury? 0       Travel Screening:   Travel Screening     Question   Response    In the last month, have you been in contact with someone who was confirmed or suspected to have Coronavirus / COVID-19? No / Unsure    Have you had a COVID-19 viral test in the last 14 days? No    Do you have any of the following new or worsening symptoms? None of these    Have you traveled internationally or domestically in the last month? No      Travel History   Travel since 04/05/21     No documented travel since 04/05/21          Health Maintenance reviewed and discussed and ordered per Provider. Health Maintenance Due   Topic Date Due    COVID-19 Vaccine (1) Never done    DTaP/Tdap/Td series (1 - Tdap) Never done    Shingrix Vaccine Age 50> (1 of 2) Never done   . Coordination of Care:  1. Have you been to the ER, urgent care clinic since your last visit? Hospitalized since your last visit? No    2.  Have you seen or consulted any other health care providers outside of the [address] [place] [address] since your last visit? Include any pap smears or colon screening. Ortho for shoulder.

## 2021-05-05 NOTE — PROGRESS NOTES
Jas Horan is a 82 y.o. female, evaluated via audio-only technology on 5/5/2021 for Cholesterol Problem  . Assessment & Plan:   Diagnoses and all orders for this visit:    1. Decreased hearing of right ear  -     REFERRAL TO ENT-OTOLARYNGOLOGY    2. Hypercholesterolemia  Work on diet. 3. Primary osteoarthritis of right shoulder  Care as per ortho. The complexity of medical decision making for this visit is moderate   Follow-up and Dispositions    · Return in about 3 months (around 8/5/2021) for high cholesterol. 12  Subjective:   Pt reports that she is doing well other than her R shoulder which is still bothering her. She is not planning to see ortho again until after she has completed her covid vaccinations. Pt has had her first covid shot. She will get the 2nd shot on 5/13/21. Pt feels that her R ear is not hearing as well as it should. She would like to go back to ent. Recent labs reviewed in detail. Prior to Admission medications    Medication Sig Start Date End Date Taking? Authorizing Provider   atorvastatin (LIPITOR) 40 mg tablet Take 1 Tab by mouth daily. 2/1/21  Yes Sameera Coleman MD   diclofenac (Voltaren) 1 % gel Apply 4 g to affected area four (4) times daily. 1/13/21  Yes Nathaly Veloz MD   celecoxib (CELEBREX) 200 mg capsule TAKE 1 CAPSULE BY MOUTH TWICE DAILY AS NEEDED FOR PAIN 11/10/20  Yes Lenin Ramirez MD   fluticasone propionate (FLONASE) 50 mcg/actuation nasal spray USE 2 SPRAYS IN BOTH NOSTRILS DAILY 10/30/20  Yes Lenin Ramirez MD   furosemide (LASIX) 20 mg tablet TAKE ONE TABLET BY MOUTH DAILY as needed FOR EDEMA 7/29/20  Yes Sameera Coleman MD   miscellaneous medical supply misc DISPENSE (1) BEDSIDE COMMODE. DX:M15.9,Z74.09,Z96.641,M62.81,Z91.81 4/5/17  Yes Sameera Coleman MD   VITAMIN D3 1,000 unit tablet  8/2/16  Yes Provider, Historical   gabapentin (NEURONTIN) 300 mg capsule Take 300 mg by mouth three (3) times daily.    Yes Provider, Historical   cyanocobalamin (VITAMIN B-12) 500 mcg tablet Take 500 mcg by mouth daily. Yes Provider, Historical     Patient Active Problem List   Diagnosis Code    High vitamin D level E67.3    Dyslipidemia E78.5    Varicose veins I83.90    Osteoarthritis M19.90    Osteoporosis M81.0    Status post right hip replacement Z96.641    Impaired mobility and ADLs Z74.09, Z78.9    Peripheral neuropathy G62.9    Postoperative anemia due to acute blood loss D62    Osteoarthritis of right hip M16.11    Decreased calculated GFR R94.4    History of kidney stones Z87.442    Numbness in both hands R20.0    Small bowel obstruction (HCC) K56.609    Hypercholesterolemia E78.00    Fx intertrochanteric hip (HCC) S72.143A    Muscle weakness (generalized) M62.81    Difficulty in walking, not elsewhere classified R26.2    ACP (advance care planning) Z71.89    Severe obesity (BMI 35.0-39. 9) E66.01    Age-related cataract of both eyes H25.9     Current Outpatient Medications   Medication Sig Dispense Refill    atorvastatin (LIPITOR) 40 mg tablet Take 1 Tab by mouth daily. 30 Tab 12    diclofenac (Voltaren) 1 % gel Apply 4 g to affected area four (4) times daily. 5 Each 5    celecoxib (CELEBREX) 200 mg capsule TAKE 1 CAPSULE BY MOUTH TWICE DAILY AS NEEDED FOR PAIN 60 Cap 5    fluticasone propionate (FLONASE) 50 mcg/actuation nasal spray USE 2 SPRAYS IN BOTH NOSTRILS DAILY 16 g 12    furosemide (LASIX) 20 mg tablet TAKE ONE TABLET BY MOUTH DAILY as needed FOR EDEMA 30 Tab 12    miscellaneous medical supply misc DISPENSE (1) BEDSIDE COMMODE. DX:M15.9,Z74.09,Z96.641,M62.81,Z91.81 1 Each 0    VITAMIN D3 1,000 unit tablet       gabapentin (NEURONTIN) 300 mg capsule Take 300 mg by mouth three (3) times daily.  cyanocobalamin (VITAMIN B-12) 500 mcg tablet Take 500 mcg by mouth daily.          No Known Allergies  Past Medical History:   Diagnosis Date    Decreased calculated GFR 12/5/2014    High vitamin D level 2014    Vitamin D 25-Hydroxy (2014) = 138.9     History of echocardiogram 2014    EF 60%. No WMA. Mild conc LVH. Gr 1 DDfx. Mild RVE. Mild CATRACHO. Sm right & sm left pleural effusion.  Hypercholesterolemia     Lower extremity venous duplex 10/02/2014    No DVT bilaterally.  Numbness in both hands     Osteoarthritis     Osteoarthritis of right hip     Osteoporosis     Peripheral neuropathy     Peripheral vascular disease (HCC)     Postoperative anemia due to acute blood loss     Vaginal fibroids     resolved s/p hyst    Varicose veins      Past Surgical History:   Procedure Laterality Date    HX CHOLECYSTECTOMY      HX HERNIA REPAIR      abdominal, had 2nd surgery for infection    HX HIP FRACTURE TX Left     HX HIP REPLACEMENT Right 2014    S/P Right total hip replacement (2014 - Dr. Reyna Chang)   [address] HX OTHER SURGICAL  2018    Left foot toe surgery-DANA VARNER @ 1 foot 2 foot    CA TOTAL KNEE ARTHROPLASTY      left    CA TOTAL KNEE ARTHROPLASTY      right    VASCULAR SURGERY PROCEDURE UNLIST      Bilateral leg vein stripping     Family History   Problem Relation Age of Onset   [facility] Arthritis-rheumatoid Mother     Heart Attack Mother     Arthritis-rheumatoid Father     Other Father         gangrene    Arthritis-osteo Sister     Other Brother         pna    Arthritis-osteo Brother     Cancer Daughter         in remission    Arthritis-osteo Brother     Diabetes Brother     Arthritis-osteo Sister      Social History     Tobacco Use    Smoking status: Former Smoker     Years: 1.00     Types: Cigarettes     Quit date: 1982     Years since quittin.3    Smokeless tobacco: Never Used   Substance Use Topics    Alcohol use: No     Comment: Quit alcohol in        Review of Systems   Constitutional: Negative. HENT: Positive for hearing loss. Respiratory: Negative. Cardiovascular: Negative. Musculoskeletal: Positive for joint pain. All other systems reviewed and are negative. No flowsheet data found. Beau Arriaga, who was evaluated through a patient-initiated, synchronous (real-time) audio only encounter, and/or her healthcare decision maker, is aware that it is a billable service, with coverage as determined by her insurance carrier. She provided verbal consent to proceed: n/a- consent obtained within past 12 months. She has not had a related appointment within my department in the past 7 days or scheduled within the next 24 hours.       Total Time: minutes: 11-20 minutes    Montez Rios MD

## 2021-06-15 ENCOUNTER — OFFICE VISIT (OUTPATIENT)
Dept: ORTHOPEDIC SURGERY | Age: 86
End: 2021-06-15
Payer: MEDICAID

## 2021-06-15 VITALS — HEIGHT: 65 IN | HEART RATE: 98 BPM | RESPIRATION RATE: 15 BRPM | BODY MASS INDEX: 38.97 KG/M2 | OXYGEN SATURATION: 96 %

## 2021-06-15 DIAGNOSIS — M12.811 ROTATOR CUFF ARTHROPATHY, RIGHT: Primary | ICD-10-CM

## 2021-06-15 PROCEDURE — 99214 OFFICE O/P EST MOD 30 MIN: CPT | Performed by: ORTHOPAEDIC SURGERY

## 2021-06-15 PROCEDURE — 20611 DRAIN/INJ JOINT/BURSA W/US: CPT | Performed by: ORTHOPAEDIC SURGERY

## 2021-06-15 RX ORDER — DICLOFENAC SODIUM 10 MG/G
4 GEL TOPICAL 4 TIMES DAILY
Qty: 500 G | Refills: 4 | Status: SHIPPED | OUTPATIENT
Start: 2021-06-15 | End: 2021-11-05 | Stop reason: SDUPTHER

## 2021-06-15 RX ORDER — TRIAMCINOLONE ACETONIDE 40 MG/ML
40 INJECTION, SUSPENSION INTRA-ARTICULAR; INTRAMUSCULAR ONCE
Status: COMPLETED | OUTPATIENT
Start: 2021-06-15 | End: 2021-06-15

## 2021-06-15 RX ADMIN — TRIAMCINOLONE ACETONIDE 40 MG: 40 INJECTION, SUSPENSION INTRA-ARTICULAR; INTRAMUSCULAR at 17:04

## 2021-06-15 NOTE — PROGRESS NOTES
Nenita Schumacher  1935   Chief Complaint   Patient presents with    Shoulder Pain     right shoulder        HISTORY OF PRESENT ILLNESS  Nenita Schumacher is a 82 y.o. female who presents today for evaluation of right shoulder pain. She rates her pain 6/10 today. She had a fall around 4 months ago. Has seen Dr. Correa Signs previously for both shoulders who has aspirated and injected both shoulders. Was seen by myself for both shoulders back in 2018. She notes some discoloration on her arm, it looks dark. Describes pain as sore. Presents today ambulating with a walker. Patient describes the pain as aching, throbbing and dull that is Constant in nature. Symptoms are worse with bending, stretching, straightening, Activity and is better with  Rest. Associated symptoms include nothing. Since problem started, it: is unchanged. Pain does not wake patient up at night. Has taken no meds for the problem. Has tried following treatments: Injections:YES; Brace:NO; Therapy:NO; Cane/Crutch:NO       No Known Allergies     Past Medical History:   Diagnosis Date    Decreased calculated GFR 12/5/2014    High vitamin D level 12/6/2014    Vitamin D 25-Hydroxy (12/06/2014) = 138.9     History of echocardiogram 08/29/2014    EF 60%. No WMA. Mild conc LVH. Gr 1 DDfx. Mild RVE. Mild CATRACHO. Sm right & sm left pleural effusion.  Hypercholesterolemia     Lower extremity venous duplex 10/02/2014    No DVT bilaterally.     Numbness in both hands     Osteoarthritis     Osteoarthritis of right hip     Osteoporosis     Peripheral neuropathy     Peripheral vascular disease (HCC)     Postoperative anemia due to acute blood loss     Vaginal fibroids     resolved s/p hyst    Varicose veins       Social History     Socioeconomic History    Marital status:      Spouse name: Not on file    Number of children: Not on file    Years of education: Not on file    Highest education level: Not on file   Occupational History    Not on file   Tobacco Use    Smoking status: Former Smoker     Years: 1.00     Types: Cigarettes     Quit date: 1982     Years since quittin.4    Smokeless tobacco: Never Used   Substance and Sexual Activity    Alcohol use: No     Comment: Quit alcohol in     Drug use: Yes     Types: Prescription, OTC     Comment: prescribed    Sexual activity: Never   Other Topics Concern    Not on file   Social History Narrative    Not on file     Social Determinants of Health     Financial Resource Strain:     Difficulty of Paying Living Expenses:    Food Insecurity:     Worried About Running Out of Food in the Last Year:     [address] in the Last Year:    Transportation Needs:     Lack of Transportation (Medical):      Lack of Transportation (Non-Medical):    Physical Activity:     Days of Exercise per Week:     Minutes of Exercise per Session:    Stress:     Feeling of Stress :    Social Connections:     Frequency of Communication with Friends and Family:     Frequency of Social Gatherings with Friends and Family:     Attends Islam Services:     Active Member of Clubs or Organizations:     Attends Club or Organization Meetings:     Marital Status:    Intimate Partner Violence:     Fear of Current or Ex-Partner:     Emotionally Abused:     Physically Abused:     Sexually Abused:       Past Surgical History:   Procedure Laterality Date    HX CHOLECYSTECTOMY      HX HERNIA REPAIR  2010    abdominal, had 2nd surgery for infection    HX HIP FRACTURE TX Left     HX HIP REPLACEMENT Right 2014    S/P Right total hip replacement (2014 - Dr. Sadie Abdul)    [address] HX OTHER SURGICAL  2018    Left foot toe surgery-DANA VARNER @ 1 foot 2 foot    ND TOTAL KNEE ARTHROPLASTY      left    ND TOTAL KNEE ARTHROPLASTY      right    VASCULAR SURGERY PROCEDURE UNLIST      Bilateral leg vein stripping      Family History   Problem Relation Age of Onset [facility] Arthritis-rheumatoid Mother     Heart Attack Mother     Arthritis-rheumatoid Father     Other Father         gangrene   [facility] Arthritis-osteo Sister     Other Brother         pna    Arthritis-osteo Brother     Cancer Daughter         in remission    Arthritis-osteo Brother     Diabetes Brother     Arthritis-osteo Sister       Current Outpatient Medications   Medication Sig    atorvastatin (LIPITOR) 40 mg tablet Take 1 Tab by mouth daily.  diclofenac (Voltaren) 1 % gel Apply 4 g to affected area four (4) times daily.  celecoxib (CELEBREX) 200 mg capsule TAKE 1 CAPSULE BY MOUTH TWICE DAILY AS NEEDED FOR PAIN    fluticasone propionate (FLONASE) 50 mcg/actuation nasal spray USE 2 SPRAYS IN BOTH NOSTRILS DAILY    furosemide (LASIX) 20 mg tablet TAKE ONE TABLET BY MOUTH DAILY as needed FOR EDEMA    miscellaneous medical supply misc DISPENSE (1) BEDSIDE COMMODE. DX:M15.9,Z74.09,Z96.641,M62.81,Z91.81    VITAMIN D3 1,000 unit tablet     gabapentin (NEURONTIN) 300 mg capsule Take 300 mg by mouth three (3) times daily.  cyanocobalamin (VITAMIN B-12) 500 mcg tablet Take 500 mcg by mouth daily. No current facility-administered medications for this visit. REVIEW OF SYSTEM   Patient denies: Weight loss, Fever/Chills, HA, Visual changes, Fatigue, Chest pain, SOB, Abdominal pain, N/V/D/C, Blood in stool or urine, Edema. Pertinent positive as above in HPI. All others were negative    PHYSICAL EXAM:   Visit Vitals  Pulse 98   Resp 15   Ht 5' 5\" (1.651 m)   LMP 12/31/1985   SpO2 96%   BMI 38.97 kg/m²     The patient is a well-developed, well-nourished female   in no acute distress. The patient is alert and oriented times three. The patient is alert and oriented times three. Mood and affect are normal.  LYMPHATIC: lymph nodes are not enlarged and are within normal limits  SKIN: normal in color and non tender to palpation. There are no bruises or abrasions noted.    NEUROLOGICAL: Motor sensory exam is within normal limits. Reflexes are equal bilaterally. There is normal sensation to pinprick and light touch  MUSCULOSKELETAL:  Examination Right shoulder   Skin Intact   AC joint tenderness -   Biceps tenderness -   Forward flexion/Elevation ROM 30   Active abduction ROM 30   Glenohumeral abduction 45   External rotation ROM 30   Internal rotation ROM 30   Apprehension -   [place]inass Relocation -   Jerk -   Load and Shift -   [place]briens -   Speeds -   Impingement sign -   Supraspinatus/Empty Can -, 5/5   External Rotation Strength -, 5/5   Lift Off/Belly Press -, 5/5   Neurovascular Intact     Diffuse swelling  PROCEDURE:  Right Shoulder Aspiration and Injection with Ultrasound Guidance    Indication:Right Shoulder pain/swelling    After sterile prep, right shoulder was aspirated. 40 cc of bloody-tinged fluid were obtained. The fluid was discarded. After sterile prep, 6 cc of Xylocaine and 1 cc of Kenalog were injected into the right Shoulder. Intra-bursal Ultrasound images captured using [address] [place] [address] Loop Ultrasound machine with a frequency of 10 MHz with a linear transducer and scanned into patient's chart.        [facility]  OFFICE PROCEDURE PROGRESS NOTE        Chart reviewed for the following:  Malcolm Knutson M.D, have reviewed the History, Physical and updated the Allergic reactions for [address] performed immediately prior to start of procedure:  Malcolm Knutson M.D, have performed the following reviews on [facility] prior to the start of the procedure:            * Patient was identified by name and date of birth   * Agreement on procedure being performed was verified  * Risks and Benefits explained to the patient  * Procedure site verified and marked as necessary  * Patient was positioned for comfort  * Needle placement confirmed by ultrasound  * Consent was signed and verified     Time: 4:51 PM     Date of procedure: 6/15/2021    Procedure performed by:  Annie Kessler M.D    Provider assisted by: (see medication administration)    How tolerated by patient: tolerated the procedure well with no complications    Comments: none      IMAGING: XR of the B/L shoulders dated 9/18/17 was reviewed and read: marked degenerative changes with proximal migration of the humeral head consistent with cuff tear arthropathy      IMPRESSION:      ICD-10-CM ICD-9-CM    1. Rotator cuff arthropathy, right  M12.811 716.81 triamcinolone acetonide (KENALOG-40) 40 mg/mL injection 40 mg      ARTHROCENTESIS ASPIR&/INJ MAJOR JT/BURSA W/US      diclofenac (Voltaren) 1 % gel        PLAN:  1. Pt presents today with right shoulder pain and symptoms c/w cuff tear arthropathy and I would like to try a cortisone injection and aspiration. Was also given prescription for Voltaren gel today. Risk factors include: BMI>35  2. No ultrasound exam indicated today  3. Yes cortisone injection and aspiration indicated today R SHOULDER US  4. No Physical/Occupational Therapy indicated today  5. No diagnostic test indicated today:   6. No durable medical equipment indicated today  7. No referral indicated today   8. Yes medications indicated today: VOLTAREN GEL  9. No Narcotic indicated today       RTC 3 weeks if pain continues      Scribed by Saba Singleton [address]) as dictated by Annie Kessler MD    I, Dr. Annie Kessler, confirm that all documentation is accurate.     Annie Kessler M.D.   [facility]

## 2021-06-29 ENCOUNTER — APPOINTMENT (OUTPATIENT)
Dept: GENERAL RADIOLOGY | Age: 86
End: 2021-06-29
Attending: EMERGENCY MEDICINE
Payer: MEDICAID

## 2021-06-29 ENCOUNTER — HOSPITAL ENCOUNTER (EMERGENCY)
Age: 86
Discharge: HOME | End: 2021-06-29
Attending: EMERGENCY MEDICINE
Payer: MEDICAID

## 2021-06-29 VITALS
BODY MASS INDEX: 39.78 KG/M2 | OXYGEN SATURATION: 98 % | SYSTOLIC BLOOD PRESSURE: 130 MMHG | WEIGHT: 233 LBS | TEMPERATURE: 98.3 F | HEIGHT: 64 IN | DIASTOLIC BLOOD PRESSURE: 81 MMHG | RESPIRATION RATE: 18 BRPM | HEART RATE: 79 BPM

## 2021-06-29 DIAGNOSIS — M25.562 ACUTE PAIN OF LEFT KNEE: Primary | ICD-10-CM
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
## 2021-06-29 PROCEDURE — 74011000250 HC RX REV CODE- 250: Performed by: PHYSICIAN ASSISTANT

## 2021-06-29 PROCEDURE — 99283 EMERGENCY DEPT VISIT LOW MDM: CPT

## 2021-06-29 PROCEDURE — 73562 X-RAY EXAM OF KNEE 3: CPT

## 2021-06-29 PROCEDURE — 74011250637 HC RX REV CODE- 250/637: Performed by: PHYSICIAN ASSISTANT

## 2021-06-29 RX ORDER — LIDOCAINE 4 G/100G
1 PATCH TOPICAL
Status: DISCONTINUED | OUTPATIENT
Start: 2021-06-29 | End: 2021-06-29 | Stop reason: HOSPADM

## 2021-06-29 RX ORDER — ACETAMINOPHEN 500 MG
1000 TABLET ORAL
Qty: 20 TABLET | Refills: 0 | Status: SHIPPED | OUTPATIENT
Start: 2021-06-29 | End: 2022-02-04 | Stop reason: ALTCHOICE

## 2021-06-29 RX ORDER — ACETAMINOPHEN AND CODEINE PHOSPHATE 300; 30 MG/1; MG/1
1 TABLET ORAL
Status: COMPLETED | OUTPATIENT
Start: 2021-06-29 | End: 2021-06-29

## 2021-06-29 RX ORDER — LIDOCAINE 50 MG/G
PATCH TOPICAL
Qty: 30 EACH | Refills: 0 | Status: SHIPPED | OUTPATIENT
Start: 2021-06-29 | End: 2022-02-04 | Stop reason: ALTCHOICE

## 2021-06-29 RX ADMIN — ACETAMINOPHEN AND CODEINE PHOSPHATE 1 TABLET: 300; 30 TABLET ORAL at 11:46

## 2021-06-29 NOTE — ED TRIAGE NOTES
Patient states tripping over her feet and falling to floor. She denies striking head or LOC during fall. She states pain to left knee.

## 2021-06-29 NOTE — DISCHARGE INSTRUCTIONS
Take medication as prescribed. Follow-up with the orthopedic physician within 1 week for reassessment. Bring the results from this visit with you for their review. Return to the ED immediately for any new, worsening, or persistent symptoms.

## 2021-06-29 NOTE — ED PROVIDER NOTES
EMERGENCY DEPARTMENT HISTORY AND PHYSICAL EXAM    11:05 AM      Date: 6/29/2021  Patient Name: Yannick Tatum    History of Presenting Illness     Chief Complaint   Patient presents with    Knee Pain    Fall         History Provided By: Patient, Son    Additional History (Context): Yannick Tatum is a 82 y.o. female with noted PMH who presents with complaint of left anterior knee pain after trip and fall that occurred this morning. Patient notes she tripped over her right foot and landed on her knee. Patient denies head injury, loss of consciousness, dizziness, headache, chest pain, shortness of breath, extremity weakness, numbness or tingling. Notes she has been able to ambulate since incident. Notes she did not take any medication for symptoms prior to arrival.  Notes she ambulates with a walker at baseline. PCP: Daiana Gilbert MD    Current Facility-Administered Medications   Medication Dose Route Frequency Provider Last Rate Last Admin    lidocaine 4 % patch 1 Patch  1 Patch TransDERmal NOW [place], [place]   1 Patch at 06/29/21 1146     Current Outpatient Medications   Medication Sig Dispense Refill    lidocaine (Lidoderm) 5 % Apply patch to the affected area for 12 hours a day and remove for 12 hours a day. 30 Each 0    acetaminophen (Tylenol Extra Strength) 500 mg tablet Take 2 Tablets by mouth every six (6) hours as needed for Pain. 20 Tablet 0    diclofenac (Voltaren) 1 % gel Apply 4 g to affected area four (4) times daily. 500 g 4    atorvastatin (LIPITOR) 40 mg tablet Take 1 Tab by mouth daily. 30 Tab 12    diclofenac (Voltaren) 1 % gel Apply 4 g to affected area four (4) times daily.  5 Each 5    celecoxib (CELEBREX) 200 mg capsule TAKE 1 CAPSULE BY MOUTH TWICE DAILY AS NEEDED FOR PAIN 60 Cap 5    fluticasone propionate (FLONASE) 50 mcg/actuation nasal spray USE 2 SPRAYS IN BOTH NOSTRILS DAILY 16 g 12    furosemide (LASIX) 20 mg tablet TAKE ONE TABLET BY MOUTH DAILY as needed FOR EDEMA 30 Tab 12    miscellaneous medical supply misc DISPENSE (1) BEDSIDE COMMODE. DX:M15.9,Z74.09,Z96.641,M62.81,Z91.81 1 Each 0    VITAMIN D3 1,000 unit tablet       gabapentin (NEURONTIN) 300 mg capsule Take 300 mg by mouth three (3) times daily.  cyanocobalamin (VITAMIN B-12) 500 mcg tablet Take 500 mcg by mouth daily. Past History     Past Medical History:  Past Medical History:   Diagnosis Date    Decreased calculated GFR 12/5/2014    High vitamin D level 12/6/2014    Vitamin D 25-Hydroxy (12/06/2014) = 138.9     History of echocardiogram 08/29/2014    EF 60%. No WMA. Mild conc LVH. Gr 1 DDfx. Mild RVE. Mild CATRACHO. Sm right & sm left pleural effusion.  Hypercholesterolemia     Lower extremity venous duplex 10/02/2014    No DVT bilaterally.     Numbness in both hands     Osteoarthritis     Osteoarthritis of right hip     Osteoporosis     Peripheral neuropathy     Peripheral vascular disease (HCC)     Postoperative anemia due to acute blood loss     Vaginal fibroids     resolved s/p hyst    Varicose veins        Past Surgical History:  Past Surgical History:   Procedure Laterality Date    HX CHOLECYSTECTOMY      HX HERNIA REPAIR  2010    abdominal, had 2nd surgery for infection    HX HIP FRACTURE TX Left     HX HIP REPLACEMENT Right 12/02/2014    S/P Right total hip replacement (12/02/2014 - Dr. Rhesa Babinski)   [address] HX OTHER SURGICAL  03/07/2018    Left foot toe surgery-DANA VARNER @ 1 foot 2 foot    [place] TOTAL KNEE ARTHROPLASTY  2006    left    [place] TOTAL KNEE ARTHROPLASTY  2001    right    VASCULAR SURGERY PROCEDURE UNLIST      Bilateral leg vein stripping       Family History:  Family History   Problem Relation Age of Onset   Sherri Dahlia Arthritis-rheumatoid Mother     Heart Attack Mother     Arthritis-rheumatoid Father     Other Father         gangrene   Sherri Dahlia Arthritis-osteo Sister     Other Brother         pna    Arthritis-osteo Brother     Cancer Daughter in remission    Arthritis-osteo Brother     Diabetes Brother    24 [facility]"" Arthritis-osteo Sister        Social History:  Social History     Tobacco Use    Smoking status: Former Smoker     Years: 1.00     Types: Cigarettes     Quit date: 1982     Years since quittin.5    Smokeless tobacco: Never Used   Substance Use Topics    Alcohol use: No     Comment: Quit alcohol in     Drug use: Yes     Types: Prescription, OTC     Comment: prescribed       Allergies:  No Known Allergies      Review of Systems       Review of Systems   Constitutional: Negative for chills and fever. Respiratory: Negative for shortness of breath. Cardiovascular: Negative for chest pain. Gastrointestinal: Negative for abdominal pain, nausea and vomiting. Musculoskeletal: Positive for arthralgias, joint swelling and myalgias. Skin: Negative for rash. Neurological: Negative for weakness. All other systems reviewed and are negative. Physical Exam     Visit Vitals  /81 (BP 1 Location: Left upper arm, BP Patient Position: At rest)   Pulse 79   Temp 98.3 °F (36.8 °C)   Resp 18   Ht 5' 4\" (1.626 m)   Wt 105.7 kg (233 lb)   LMP 1985   SpO2 98%   BMI 39.99 kg/m²         Physical Exam  Vitals and nursing note reviewed. Constitutional:       General: She is not in acute distress. Appearance: Normal appearance. She is well-developed. She is not ill-appearing, toxic-appearing or diaphoretic. HENT:      Head: Normocephalic and atraumatic. Cardiovascular:      Rate and Rhythm: Normal rate and regular rhythm. Heart sounds: Normal heart sounds. No murmur heard. No friction rub. No gallop. Pulmonary:      Effort: Pulmonary effort is normal. No respiratory distress. Breath sounds: Normal breath sounds. No wheezing or rales. Musculoskeletal:         General: Normal range of motion. Cervical back: Normal, normal range of motion and neck supple. No tenderness.       Left hip: Normal. Left upper leg: Normal.      Left knee: Bony tenderness (medial aspect of knee TTP, small suprapatellar effusion) present. No swelling, deformity, erythema, ecchymosis, lacerations or crepitus. Normal range of motion. Normal alignment. Left lower leg: Normal.      Comments: No erythema/ecchymosis to knee, midline scar, dorsalis pedis 2+    Skin:     General: Skin is warm. Findings: No rash. Neurological:      General: No focal deficit present. Mental Status: She is alert and oriented to person, place, and time. Cranial Nerves: No cranial nerve deficit. Sensory: No sensory deficit. Motor: No weakness. Diagnostic Study Results     Labs -  No results found for this or any previous visit (from the past 12 hour(s)). Radiologic Studies -   XR KNEE LT 3 V   Final Result   Findings/impression:      Status post total knee arthroplasty. No periprosthetic fracture or definite   evidence of loosening or hardware failure. Chronic calcifications in the   suprapatellar recess. Venous varicosities noted throughout. Bone mineral density is decreased which limits evaluation. Right knee arthroplasty partially imaged. Medical Decision Making   I am the first provider for this patient. I reviewed the vital signs, available nursing notes, past medical history, past surgical history, family history and social history. Vital Signs-Reviewed the patient's vital signs. Records Reviewed: Nursing Notes and Old Medical Records (Time of Review: 11:05 AM)    ED Course: Progress Notes, Reevaluation, and Consults:  11:50 PM Reviewed results with patient and son. Discussed need for close outpatient follow-up with orthopedic this week for reassessment. Discussed strict return precautions, including leg swelling, discoloration, or any other medical concerns.      Provider Notes (Medical Decision Making): 81 yo F with hx of TKR who presents to the ED due to L anterior knee pain after fall. No head injury or LOC. Ambulatory with walker since fall. Extremity neurovascularly intact. X-ray without acute process. Stable for d/c with pain control, ace wrap, close outpatient follow-up with orthopedic physician for further assessment. Strict return precautions provided. Diagnosis     Clinical Impression:   1. Acute pain of left knee    2. Fall, initial encounter        Disposition: home     Follow-up Information     Follow [facility] With Specialties Details [facility]  If symptoms worsen [address] [facility]  [phone]    Idalia Ashton MD Family Medicine Schedule an appointment as soon as possible for a visit   [address] [facility] [address]  Schedule an appointment as soon as possible for a visit   [address]  [phone]           Discharge Medication List as of 6/29/2021 11:37 AM      START taking these medications    Details   lidocaine (Lidoderm) 5 % Apply patch to the affected area for 12 hours a day and remove for 12 hours a day., Normal, Disp-30 Each, R-0      acetaminophen (Tylenol Extra Strength) 500 mg tablet Take 2 Tablets by mouth every six (6) hours as needed for Pain., Normal, Disp-20 Tablet, R-0         CONTINUE these medications which have NOT CHANGED    Details   !! diclofenac (Voltaren) 1 % gel Apply 4 g to affected area four (4) times daily. , Normal, Disp-500 g, R-4      atorvastatin (LIPITOR) 40 mg tablet Take 1 Tab by mouth daily. , Normal, Disp-30 Tab, R-12      !! diclofenac (Voltaren) 1 % gel Apply 4 g to affected area four (4) times daily. , Normal, Disp-5 Each, R-5      celecoxib (CELEBREX) 200 mg capsule TAKE 1 CAPSULE BY MOUTH TWICE DAILY AS NEEDED FOR PAIN, Normal, Disp-60 Cap, R-5      fluticasone propionate (FLONASE) 50 mcg/actuation nasal spray USE 2 SPRAYS IN BOTH NOSTRILS DAILY, Normal, Disp-16 g, R-12      furosemide (LASIX) 20 mg tablet TAKE ONE TABLET BY MOUTH DAILY as needed FOR EDEMA, Normal, Disp-30 Tab,R-12      miscellaneous medical supply misc DISPENSE (1) BEDSIDE COMMODE. DX:M15.9,Z74.09,Z96.641,M62.81,Z91.81, Print, Disp-1 Each, R-0      VITAMIN D3 1,000 unit tablet Historical Med, ENZO      gabapentin (NEURONTIN) 300 mg capsule Take 300 mg by mouth three (3) times daily. , Historical Med      cyanocobalamin (VITAMIN B-12) 500 mcg tablet Take 500 mcg by mouth daily. , Historical Med       !! - Potential duplicate medications found. Please discuss with provider. Dictation disclaimer:  Please note that this dictation was completed with [employer], the computer voice recognition software. Quite often unanticipated grammatical, syntax, homophones, and other interpretive errors are inadvertently transcribed by the computer software. Please disregard these errors. Please excuse any errors that have escaped final proofreading.

## 2021-06-29 NOTE — ED NOTES
Morro Stacy is a 82 y.o. female that was discharged in stable condition. The patients diagnosis, condition and treatment were explained to  patient and aftercare instructions were given. The patient verbalized understanding. Patient armband removed and shredded.

## 2021-07-06 ENCOUNTER — OFFICE VISIT (OUTPATIENT)
Dept: ORTHOPEDIC SURGERY | Age: 86
End: 2021-07-06
Payer: MEDICAID

## 2021-07-06 VITALS
WEIGHT: 233 LBS | HEIGHT: 64 IN | HEART RATE: 82 BPM | BODY MASS INDEX: 39.78 KG/M2 | OXYGEN SATURATION: 97 % | RESPIRATION RATE: 15 BRPM

## 2021-07-06 DIAGNOSIS — M12.811 ROTATOR CUFF ARTHROPATHY, RIGHT: Primary | ICD-10-CM

## 2021-07-06 PROCEDURE — 99212 OFFICE O/P EST SF 10 MIN: CPT | Performed by: ORTHOPAEDIC SURGERY

## 2021-07-06 NOTE — PROGRESS NOTES
Trisha Wright  1935   Chief Complaint   Patient presents with    Shoulder Pain     right shoulder        HISTORY OF PRESENT ILLNESS  Trisha Wright is a 82 y.o. female who presents today for reevaluation of right shoulder. Patient rates pain as 5/10 today. She had a fall over 4 months ago. Has seen Dr. Josh Toure previously for both shoulders who has aspirated and injected both shoulders. Was seen by myself for both shoulders back in 2018. Presents today ambulating with a walker. At last OV on 6/15/2021, patient had a right shoulder cortisone injection and aspiration which provided relief. She is doing better today. Swelling has improved. She did fall onto her left knee last week. Has hx of knee replacement by Dr. Joseph Bartlett. Patient denies any fever, chills, chest pain, shortness of breath or calf pain. The remainder of the review of systems is negative. There are no new illness or injuries to report since last seen in the office. There are no changes to medications, allergies, family or social history. Pain Assessment  7/6/2021   Location of Pain Shoulder   Location Modifiers Right   Severity of Pain 5   Quality of Pain Aching   Duration of Pain Persistent   Frequency of Pain Constant   Date Pain First Started -   Aggravating Factors Bending;Stretching;Walking   Aggravating Factors Comment -   Limiting Behavior Yes   Relieving Factors -   Relieving Factors Comment -   Result of Injury -   Work-Related Injury -   Type of Injury -       PHYSICAL EXAM:   Visit Vitals  Pulse 82   Resp 15   Ht 5' 4\" (1.626 m)   Wt 233 lb (105.7 kg)   LMP 12/31/1985   SpO2 97%   BMI 39.99 kg/m²     The patient is a well-developed, well-nourished female   in no acute distress. The patient is alert and oriented times three. The patient is alert and oriented times three. Mood and affect are normal.  LYMPHATIC: lymph nodes are not enlarged and are within normal limits  SKIN: normal in color and non tender to palpation.  There are no bruises or abrasions noted. NEUROLOGICAL: Motor sensory exam is within normal limits. Reflexes are equal bilaterally. There is normal sensation to pinprick and light touch  MUSCULOSKELETAL:  Examination Right shoulder   Skin Intact   AC joint tenderness -   Biceps tenderness -   Forward flexion/Elevation ROM 30   Active abduction ROM 30   Glenohumeral abduction 45   External rotation ROM 30   Internal rotation ROM 30   Apprehension -   Salinass Relocation -   Jerk -   Load and Shift -   Obriens -   Speeds -   Impingement sign -   Supraspinatus/Empty Can -, 5/5   External Rotation Strength -, 5/5   Lift Off/Belly Press -, 5/5   Neurovascular Intact      Diffuse swelling    IMAGING: XR of the B/L shoulders dated 9/18/17 was reviewed and read: marked degenerative changes with proximal migration of the humeral head consistent with cuff tear arthropathy      IMPRESSION:      ICD-10-CM ICD-9-CM    1. Rotator cuff arthropathy, right  M12.811 716.81         PLAN:   1. Pt presents today with right shoulder pain and symptoms c/w cuff tear arthropathy and she notes overall relief with the cortisone injection given at last OV. She is doing well today and can return as needed. Risk factors include: BMI>35  2. No ultrasound exam indicated today  3. No cortisone injection indicated today   4. No Physical/Occupational Therapy indicated today  5. No diagnostic test indicated today:   6. No durable medical equipment indicated today  7. No referral indicated today   8. No medications indicated today:   9. No Narcotic indicated today       RTC prn      Scribed by Dot Coxtinglaya Bonilla) as dictated by Hayley Alvarenga MD    I, Dr. Hayley Alvarenga, confirm that all documentation is accurate.     Hayley Alvarenga M.D.   [facility]

## 2021-08-05 ENCOUNTER — OFFICE VISIT (OUTPATIENT)
Dept: FAMILY MEDICINE CLINIC | Age: 86
End: 2021-08-05
Payer: MEDICAID

## 2021-08-05 VITALS
DIASTOLIC BLOOD PRESSURE: 72 MMHG | BODY MASS INDEX: 37.56 KG/M2 | HEART RATE: 80 BPM | RESPIRATION RATE: 20 BRPM | SYSTOLIC BLOOD PRESSURE: 132 MMHG | WEIGHT: 220 LBS | TEMPERATURE: 97.5 F | HEIGHT: 64 IN | OXYGEN SATURATION: 96 %

## 2021-08-05 DIAGNOSIS — H91.91 DECREASED HEARING OF RIGHT EAR: ICD-10-CM

## 2021-08-05 DIAGNOSIS — M15.9 PRIMARY OSTEOARTHRITIS INVOLVING MULTIPLE JOINTS: Chronic | ICD-10-CM

## 2021-08-05 DIAGNOSIS — E78.00 HYPERCHOLESTEROLEMIA: Primary | ICD-10-CM

## 2021-08-05 DIAGNOSIS — M19.011 PRIMARY OSTEOARTHRITIS OF RIGHT SHOULDER: ICD-10-CM

## 2021-08-05 PROCEDURE — 99214 OFFICE O/P EST MOD 30 MIN: CPT | Performed by: FAMILY MEDICINE

## 2021-08-05 RX ORDER — CELECOXIB 200 MG/1
200 CAPSULE ORAL DAILY
Qty: 60 CAPSULE | Refills: 12 | Status: SHIPPED | OUTPATIENT
Start: 2021-08-05 | End: 2022-02-04 | Stop reason: SDUPTHER

## 2021-08-05 NOTE — PROGRESS NOTES
Chief Complaint   Patient presents with    Cholesterol Problem    Medication Refill     pt requesting celebrex refill. HPI    Kamila Box is a 82 y.o. female presenting today for 3 months  follow up of hld. Pt has been seeing ortho for her R shoulder pain. Her shoulder is better since the last injection. No recent labs. Patient does need medication refills today. New concerns today: pt recently had an injury to her right lower leg. she accidentally hit it on her walker. There was some oozing. She was seen at an urgent care. There was no signs of cellulitis. The area has healed. Review of Systems   Constitutional: Negative. HENT: Negative. Respiratory: Negative. Cardiovascular: Negative. All other systems reviewed and are negative. Physical Exam  Vitals and nursing note reviewed. Constitutional:       Appearance: Normal appearance. She is not ill-appearing. HENT:      Head: Normocephalic and atraumatic. Right Ear: External ear normal.      Left Ear: External ear normal.      Nose: Nose normal.      Mouth/Throat:      Mouth: Mucous membranes are moist.   Eyes:      Extraocular Movements: Extraocular movements intact. Conjunctiva/sclera: Conjunctivae normal.   Cardiovascular:      Rate and Rhythm: Normal rate and regular rhythm. Heart sounds: No murmur heard. No friction rub. No gallop. Pulmonary:      Effort: Pulmonary effort is normal.      Breath sounds: Normal breath sounds. No wheezing, rhonchi or rales. Musculoskeletal:         General: Normal range of motion. Cervical back: Normal range of motion. Skin:     General: Skin is warm and dry. Neurological:      Mental Status: She is alert and oriented to person, place, and time. Coordination: Coordination normal.   Psychiatric:         Mood and Affect: Mood normal.         Behavior: Behavior normal.         Thought Content:  Thought content normal.         Judgment: Judgment normal.         Diagnoses and all orders for this visit:    1. Hypercholesterolemia  -     METABOLIC PANEL, COMPREHENSIVE; Future  -     LIPID PANEL; Future    2. Primary osteoarthritis involving multiple joints  -     celecoxib (CELEBREX) 200 mg capsule; Take 1 Capsule by mouth daily. 3. Primary osteoarthritis of right shoulder  Diclofenac gel as needed. 4. Decreased hearing of right ear  Stable. Care as per ENT. Follow-up and Dispositions    · Return in about 3 months (around 11/5/2021) for high cholesterol, lab review.

## 2021-08-05 NOTE — PROGRESS NOTES
Choi Lung presents today for   Chief Complaint   Patient presents with    Cholesterol Problem       Choi Lung preferred language for health care discussion is english/other. Is someone accompanying this pt? Son, Brooke Santos    Is the patient using any DME equipment during [address]? Rollator walker    Depression Screening:  3 most recent PHQ Screens 7/6/2021   Little interest or pleasure in doing things Not at all   Feeling down, depressed, irritable, or hopeless Not at all   Total Score PHQ 2 0       Learning Assessment:  Learning Assessment 1/27/2020   PRIMARY LEARNER Patient   HIGHEST LEVEL OF EDUCATION - PRIMARY LEARNER  DID NOT GRADUATE [facility] PRIMARY LEARNER NONE   CO-LEARNER CAREGIVER No   PRIMARY LANGUAGE ENGLISH    NEED -   LEARNER PREFERENCE PRIMARY LISTENING   ANSWERED BY self   RELATIONSHIP SELF       Fall Risk  Fall Risk Assessment, last 12 mths 7/6/2021   Able to walk? Yes   Fall in past 12 months? 0   Do you feel unsteady? 0   Are you worried about falling 0   Is the gait abnormal? -   Number of falls in past 12 months -   Fall with injury? -       Travel Screening:   Travel Screening      No screening recorded since 08/04/21 0000      Travel History   Travel since 07/05/21     No documented travel since 07/05/21          Health Maintenance reviewed and discussed and ordered per Provider. Health Maintenance Due   Topic Date Due    COVID-19 Vaccine (1) Never done    DTaP/Tdap/Td series (1 - Tdap) Never done    Shingrix Vaccine Age 50> (1 of 2) Never done   . Coordination of Care:  1. Have you been to the ER, urgent care clinic since your last visit? Hospitalized since your last visit? No    2. Have you seen or consulted any other health care providers outside of the [address] [place] [address] since your last visit? Include any pap smears or colon screening.  Yes Dr. Dahiana Mak (neuro), Dr. Arnaldo Wagoner for shoulder pain, Dr. Bree Castro for cerumen impaction and hearing eval

## 2021-10-30 ENCOUNTER — HOSPITAL ENCOUNTER (OUTPATIENT)
Dept: LAB | Age: 86
Discharge: HOME | End: 2021-10-30

## 2021-10-30 LAB — XX-LABCORP SPECIMEN COL,LCBCF: NORMAL

## 2021-10-30 PROCEDURE — 99001 SPECIMEN HANDLING PT-LAB: CPT

## 2021-10-31 LAB
ALBUMIN SERPL-MCNC: 4 G/DL (ref 3.6–4.6)
ALBUMIN/GLOB SERPL: 1.5 {RATIO} (ref 1.2–2.2)
ALP SERPL-CCNC: 120 IU/L (ref 44–121)
ALT SERPL-CCNC: 13 IU/L (ref 0–32)
AST SERPL-CCNC: 15 IU/L (ref 0–40)
BILIRUB SERPL-MCNC: 0.3 MG/DL (ref 0–1.2)
BUN SERPL-MCNC: 12 MG/DL (ref 8–27)
BUN/CREAT SERPL: 13 (ref 12–28)
CALCIUM SERPL-MCNC: 9.6 MG/DL (ref 8.7–10.3)
CHLORIDE SERPL-SCNC: 104 MMOL/L (ref 96–106)
CHOLEST SERPL-MCNC: 268 MG/DL (ref 100–199)
CO2 SERPL-SCNC: 24 MMOL/L (ref 20–29)
CREAT SERPL-MCNC: 0.93 MG/DL (ref 0.57–1)
GLOBULIN SER CALC-MCNC: 2.6 G/DL (ref 1.5–4.5)
GLUCOSE SERPL-MCNC: 95 MG/DL (ref 65–99)
HDLC SERPL-MCNC: 45 MG/DL
IMP & REVIEW OF LAB RESULTS: NORMAL
INTERPRETATION: NORMAL
LDLC SERPL CALC-MCNC: 192 MG/DL (ref 0–99)
POTASSIUM SERPL-SCNC: 4.7 MMOL/L (ref 3.5–5.2)
PROT SERPL-MCNC: 6.6 G/DL (ref 6–8.5)
SODIUM SERPL-SCNC: 142 MMOL/L (ref 134–144)
TRIGL SERPL-MCNC: 164 MG/DL (ref 0–149)
VLDLC SERPL CALC-MCNC: 31 MG/DL (ref 5–40)

## 2021-11-05 ENCOUNTER — OFFICE VISIT (OUTPATIENT)
Dept: FAMILY MEDICINE CLINIC | Age: 86
End: 2021-11-05
Payer: MEDICAID

## 2021-11-05 VITALS
TEMPERATURE: 98.2 F | BODY MASS INDEX: 38.28 KG/M2 | OXYGEN SATURATION: 98 % | SYSTOLIC BLOOD PRESSURE: 120 MMHG | WEIGHT: 224.2 LBS | DIASTOLIC BLOOD PRESSURE: 69 MMHG | HEIGHT: 64 IN | RESPIRATION RATE: 20 BRPM | HEART RATE: 65 BPM

## 2021-11-05 DIAGNOSIS — L98.9 SKIN LESION: ICD-10-CM

## 2021-11-05 DIAGNOSIS — E78.00 HYPERCHOLESTEROLEMIA: Primary | ICD-10-CM

## 2021-11-05 DIAGNOSIS — Z02.79 MEDICAL CERTIFICATE ISSUANCE: ICD-10-CM

## 2021-11-05 DIAGNOSIS — M19.011 PRIMARY OSTEOARTHRITIS OF RIGHT SHOULDER: ICD-10-CM

## 2021-11-05 PROCEDURE — 99214 OFFICE O/P EST MOD 30 MIN: CPT | Performed by: FAMILY MEDICINE

## 2021-11-05 NOTE — PROGRESS NOTES
Chief Complaint   Patient presents with    Cholesterol Problem     high chol    Results     discuss lab results         HPI    Johnathon Mohs is a 82 y.o. female presenting today for 3 months  follow up of hld. Patient had labs on 10/30/21. Labs reviewed in detail with patient     Pt is seeing Dr. Niki Castrejon for her shoulder pain. It is improving. Patient does not need medication refills today. New concerns today: pt's son needs fmla paperwork completed to be able to take her to appts. Pt c/o a skin lesion on her R shoulder. Pt declines mammogram.      Review of Systems   Constitutional: Negative. HENT: Negative. Respiratory: Negative. Cardiovascular: Negative. All other systems reviewed and are negative. Physical Exam  Vitals and nursing note reviewed. Constitutional:       Appearance: Normal appearance. She is not ill-appearing. HENT:      Head: Normocephalic and atraumatic. Right Ear: External ear normal.      Left Ear: External ear normal.      Nose: Nose normal.      Mouth/Throat:      Mouth: Mucous membranes are moist.   Eyes:      Extraocular Movements: Extraocular movements intact. Conjunctiva/sclera: Conjunctivae normal.   Cardiovascular:      Rate and Rhythm: Normal rate and regular rhythm. Heart sounds: No murmur heard. No friction rub. No gallop. Pulmonary:      Effort: Pulmonary effort is normal.      Breath sounds: Normal breath sounds. No wheezing, rhonchi or rales. Musculoskeletal:         General: Normal range of motion. Cervical back: Normal range of motion. Skin:     General: Skin is warm and dry. Neurological:      Mental Status: She is alert and oriented to person, place, and time. Coordination: Coordination normal.   Psychiatric:         Mood and Affect: Mood normal.         Behavior: Behavior normal.         Thought Content:  Thought content normal.         Judgment: Judgment normal.         Diagnoses and all orders for this visit:    1. Hypercholesterolemia  -     METABOLIC PANEL, COMPREHENSIVE; Future  -     LIPID PANEL; Future  Pt will work on med compliance with her statin    2. Skin lesion  Contact info given for Dr. Alice Oropeza. Pt's son will sched appt. 3. Medical certificate issuance  fmla forms completed and returned to pt's son today    4. Primary osteoarthritis of right shoulder  Improved; care as per ortho    Follow-up and Dispositions    · Return in about 3 months (around 2/5/2022) for high cholesterol, lab review.

## 2021-11-05 NOTE — PROGRESS NOTES
Any Wan presents today for   Chief Complaint   Patient presents with    Cholesterol Problem     high chol    Results     discuss lab results       Is someone accompanying this pt? Yes, son, Erskine Sandifer    Is the patient using any DME equipment during OV? Yes, walker    Depression Screening:  3 most recent PHQ Screens 11/5/2021   Little interest or pleasure in doing things Not at all   Feeling down, depressed, irritable, or hopeless Not at all   Total Score PHQ 2 0       Learning Assessment:  Learning Assessment 1/27/2020   PRIMARY LEARNER Patient   HIGHEST LEVEL OF EDUCATION - PRIMARY LEARNER  DID NOT GRADUATE [facility] PRIMARY LEARNER NONE   CO-LEARNER CAREGIVER No   PRIMARY LANGUAGE ENGLISH    NEED -   LEARNER PREFERENCE PRIMARY LISTENING   ANSWERED BY self   RELATIONSHIP SELF       Abuse Screening:  Abuse Screening Questionnaire 8/5/2021   Do you ever feel afraid of your partner? N   Are you in a relationship with someone who physically or mentally threatens you? N   Is it safe for you to go home? Y       Fall Screening  Fall Risk Assessment, last 12 mths 7/6/2021   Able to walk? Yes   Fall in past 12 months? 0   Do you feel unsteady? 0   Are you worried about falling 0   Is the gait abnormal? -   Number of falls in past 12 months -   Fall with injury? -       Generalized Anxiety  No flowsheet data found. Health Maintenance Due   Topic Date Due    DTaP/Tdap/Td series (1 - Tdap) Never done    Shingrix Vaccine Age 50> (1 of 2) Never done    Flu Vaccine (1) Never done   . Health Maintenance reviewed and discussed and ordered per Provider. Coordination of Care  1. Have you been to the ER, urgent care clinic since your last visit? Hospitalized since your last visit? Yes, UC    2. Have you seen or consulted any other health care providers outside of the [address] [place] [address] since your last visit? Include any pap smears or colon screening.  no

## 2022-02-04 ENCOUNTER — OFFICE VISIT (OUTPATIENT)
Dept: FAMILY MEDICINE CLINIC | Age: 87
End: 2022-02-04
Payer: MEDICAID

## 2022-02-04 VITALS
TEMPERATURE: 97.6 F | SYSTOLIC BLOOD PRESSURE: 113 MMHG | BODY MASS INDEX: 38.9 KG/M2 | HEART RATE: 66 BPM | RESPIRATION RATE: 16 BRPM | WEIGHT: 226.6 LBS | DIASTOLIC BLOOD PRESSURE: 70 MMHG | OXYGEN SATURATION: 98 %

## 2022-02-04 DIAGNOSIS — E78.00 HYPERCHOLESTEROLEMIA: Primary | ICD-10-CM

## 2022-02-04 DIAGNOSIS — M19.011 PRIMARY OSTEOARTHRITIS OF RIGHT SHOULDER: ICD-10-CM

## 2022-02-04 DIAGNOSIS — M15.9 PRIMARY OSTEOARTHRITIS INVOLVING MULTIPLE JOINTS: Chronic | ICD-10-CM

## 2022-02-04 DIAGNOSIS — G62.9 NEUROPATHY: ICD-10-CM

## 2022-02-04 DIAGNOSIS — E66.01 SEVERE OBESITY (BMI 35.0-39.9) WITH COMORBIDITY (HCC): ICD-10-CM

## 2022-02-04 DIAGNOSIS — M19.011 PRIMARY OSTEOARTHRITIS, RIGHT SHOULDER: ICD-10-CM

## 2022-02-04 DIAGNOSIS — R60.0 EDEMA OF BOTH LEGS: ICD-10-CM

## 2022-02-04 PROCEDURE — 99214 OFFICE O/P EST MOD 30 MIN: CPT | Performed by: FAMILY MEDICINE

## 2022-02-04 RX ORDER — CELECOXIB 200 MG/1
200 CAPSULE ORAL DAILY
Qty: 60 CAPSULE | Refills: 12 | Status: SHIPPED | OUTPATIENT
Start: 2022-02-04 | End: 2022-03-16 | Stop reason: SDUPTHER

## 2022-02-04 RX ORDER — GABAPENTIN 300 MG/1
300 CAPSULE ORAL
COMMUNITY
Start: 2022-02-04 | End: 2022-03-10 | Stop reason: SDUPTHER

## 2022-02-04 RX ORDER — FUROSEMIDE 20 MG/1
TABLET ORAL
Qty: 30 TABLET | Refills: 12 | Status: SHIPPED | OUTPATIENT
Start: 2022-02-04 | End: 2022-07-07 | Stop reason: SDUPTHER

## 2022-02-04 RX ORDER — ATORVASTATIN CALCIUM 40 MG/1
40 TABLET, FILM COATED ORAL DAILY
Qty: 30 TABLET | Refills: 12 | Status: SHIPPED | OUTPATIENT
Start: 2022-02-04

## 2022-02-04 RX ORDER — DICLOFENAC SODIUM 10 MG/G
4 GEL TOPICAL 4 TIMES DAILY
Qty: 5 EACH | Refills: 5 | Status: SHIPPED | OUTPATIENT
Start: 2022-02-04

## 2022-02-04 NOTE — PROGRESS NOTES
Jordyn Ballard presents today for   Chief Complaint   Patient presents with    Cholesterol Problem    Labs      patient unable to do labs .  Other      patients neurologist is retiring and no one is taking over practice . patient and son are wondering if you willing to take over gabapentin rx . Is someone accompanying this pt? Patients son , Shanika Curran      Is the patient using any DME equipment during OV? Yes rollater     Depression Screening:  3 most recent PHQ Screens 11/5/2021   Little interest or pleasure in doing things Not at all   Feeling down, depressed, irritable, or hopeless Not at all   Total Score PHQ 2 0       Learning Assessment:  Learning Assessment 1/27/2020   PRIMARY LEARNER Patient   HIGHEST LEVEL OF EDUCATION - PRIMARY LEARNER  DID NOT GRADUATE [facility] PRIMARY LEARNER NONE   CO-LEARNER CAREGIVER No   PRIMARY LANGUAGE ENGLISH    NEED -   LEARNER PREFERENCE PRIMARY LISTENING   ANSWERED BY self   RELATIONSHIP SELF       Abuse Screening:  Abuse Screening Questionnaire 8/5/2021   Do you ever feel afraid of your partner? N   Are you in a relationship with someone who physically or mentally threatens you? N   Is it safe for you to go home? Y       Fall Screening  Fall Risk Assessment, last 12 mths 7/6/2021   Able to walk? Yes   Fall in past 12 months? 0   Do you feel unsteady? 0   Are you worried about falling 0   Is the gait abnormal? -   Number of falls in past 12 months -   Fall with injury? -       Generalized Anxiety  No flowsheet data found. Health Maintenance Due   Topic Date Due    DTaP/Tdap/Td series (1 - Tdap) Never done    Shingrix Vaccine Age 50> (1 of 2) Never done    Flu Vaccine (1) Never done   . Health Maintenance reviewed and discussed and ordered per Provider. Vaccines Due   Screenings Due       Jordyn Ballard is updated on all     1. \"Have you been to the ER, urgent care clinic since your last visit? Hospitalized since your last visit? \" No    2. \"Have you seen or consulted any other health care providers outside of the [address] [place] [address] since your last visit? \" No     3. For patients aged 39-70: Has the patient had a colonoscopy / FIT/ Cologuard? NA - based on age     If the patient is female:    4. For patients aged 41-77: Has the patient had a mammogram within the past 2 years? NA - based on age    11. For patients aged 21-65: Has the patient had a pap smear?  NA - based on age

## 2022-03-10 DIAGNOSIS — G62.9 NEUROPATHY: ICD-10-CM

## 2022-03-10 NOTE — TELEPHONE ENCOUNTER
Patient need amedication refill. Please advise     Requested Prescriptions     Pending Prescriptions Disp Refills    gabapentin (NEURONTIN) 300 mg capsule       Sig: Take 1 Capsule by mouth two (2) times daily as needed for Pain. Max Daily Amount: 600 mg.

## 2022-03-13 RX ORDER — GABAPENTIN 300 MG/1
300 CAPSULE ORAL
Qty: 60 CAPSULE | Refills: 3 | Status: SHIPPED | OUTPATIENT
Start: 2022-03-13 | End: 2022-07-07 | Stop reason: SDUPTHER

## 2022-03-16 ENCOUNTER — TELEPHONE (OUTPATIENT)
Dept: FAMILY MEDICINE CLINIC | Age: 87
End: 2022-03-16

## 2022-03-16 DIAGNOSIS — M15.9 PRIMARY OSTEOARTHRITIS INVOLVING MULTIPLE JOINTS: Chronic | ICD-10-CM

## 2022-03-16 DIAGNOSIS — M19.011 PRIMARY OSTEOARTHRITIS OF RIGHT SHOULDER: ICD-10-CM

## 2022-03-16 RX ORDER — CELECOXIB 200 MG/1
200 CAPSULE ORAL DAILY
Qty: 60 CAPSULE | Refills: 12 | Status: SHIPPED | OUTPATIENT
Start: 2022-03-16

## 2022-03-16 NOTE — TELEPHONE ENCOUNTER
Patient needed PA for celebrex PA sent to [facility] and was approved through 3/16/23 .  Patient aware will obtain RX from pharmacy

## 2022-03-16 NOTE — TELEPHONE ENCOUNTER
Patient requesting refill on this medication, please advise. Requested Prescriptions     Pending Prescriptions Disp Refills    celecoxib (CELEBREX) 200 mg capsule 60 Capsule 12     Sig: Take 1 Capsule by mouth daily.

## 2022-03-18 PROBLEM — Z71.89 ACP (ADVANCE CARE PLANNING): Status: ACTIVE | Noted: 2017-04-04

## 2022-03-19 PROBLEM — H25.9 AGE-RELATED CATARACT OF BOTH EYES: Status: ACTIVE | Noted: 2018-12-10

## 2022-04-23 ENCOUNTER — HOSPITAL ENCOUNTER (OUTPATIENT)
Dept: LAB | Age: 87
Discharge: HOME | End: 2022-04-23

## 2022-04-23 DIAGNOSIS — E78.00 HYPERCHOLESTEROLEMIA: ICD-10-CM

## 2022-04-23 LAB — XX-LABCORP SPECIMEN COL,LCBCF: NORMAL

## 2022-04-23 PROCEDURE — 99001 SPECIMEN HANDLING PT-LAB: CPT

## 2022-04-24 LAB
ALBUMIN SERPL-MCNC: 3.9 G/DL (ref 3.6–4.6)
ALBUMIN/GLOB SERPL: 1.4 {RATIO} (ref 1.2–2.2)
ALP SERPL-CCNC: 139 IU/L (ref 44–121)
ALT SERPL-CCNC: 12 IU/L (ref 0–32)
AST SERPL-CCNC: 18 IU/L (ref 0–40)
BILIRUB SERPL-MCNC: 0.6 MG/DL (ref 0–1.2)
BUN SERPL-MCNC: 18 MG/DL (ref 8–27)
BUN/CREAT SERPL: 20 (ref 12–28)
CALCIUM SERPL-MCNC: 9.8 MG/DL (ref 8.7–10.3)
CHLORIDE SERPL-SCNC: 103 MMOL/L (ref 96–106)
CHOLEST SERPL-MCNC: 226 MG/DL (ref 100–199)
CO2 SERPL-SCNC: 21 MMOL/L (ref 20–29)
CREAT SERPL-MCNC: 0.92 MG/DL (ref 0.57–1)
EGFR: 61 ML/MIN/1.73
GLOBULIN SER CALC-MCNC: 2.7 G/DL (ref 1.5–4.5)
GLUCOSE SERPL-MCNC: 90 MG/DL (ref 65–99)
HDLC SERPL-MCNC: 61 MG/DL
IMP & REVIEW OF LAB RESULTS: NORMAL
LDLC SERPL CALC-MCNC: 152 MG/DL (ref 0–99)
POTASSIUM SERPL-SCNC: 4.5 MMOL/L (ref 3.5–5.2)
PROT SERPL-MCNC: 6.6 G/DL (ref 6–8.5)
SODIUM SERPL-SCNC: 140 MMOL/L (ref 134–144)
TRIGL SERPL-MCNC: 72 MG/DL (ref 0–149)
VLDLC SERPL CALC-MCNC: 13 MG/DL (ref 5–40)

## 2022-05-05 ENCOUNTER — OFFICE VISIT (OUTPATIENT)
Dept: FAMILY MEDICINE CLINIC | Age: 87
End: 2022-05-05
Payer: MEDICARE

## 2022-05-05 VITALS
WEIGHT: 216.8 LBS | OXYGEN SATURATION: 97 % | DIASTOLIC BLOOD PRESSURE: 75 MMHG | HEART RATE: 73 BPM | SYSTOLIC BLOOD PRESSURE: 139 MMHG | BODY MASS INDEX: 37.21 KG/M2 | RESPIRATION RATE: 16 BRPM

## 2022-05-05 DIAGNOSIS — G62.9 NEUROPATHY: ICD-10-CM

## 2022-05-05 DIAGNOSIS — M15.9 PRIMARY OSTEOARTHRITIS INVOLVING MULTIPLE JOINTS: ICD-10-CM

## 2022-05-05 DIAGNOSIS — E78.00 HYPERCHOLESTEROLEMIA: Primary | ICD-10-CM

## 2022-05-05 PROCEDURE — 99214 OFFICE O/P EST MOD 30 MIN: CPT | Performed by: FAMILY MEDICINE

## 2022-05-05 PROCEDURE — 1101F PT FALLS ASSESS-DOCD LE1/YR: CPT | Performed by: FAMILY MEDICINE

## 2022-05-05 PROCEDURE — G8427 DOCREV CUR MEDS BY ELIG CLIN: HCPCS | Performed by: FAMILY MEDICINE

## 2022-05-05 PROCEDURE — G8536 NO DOC ELDER MAL SCRN: HCPCS | Performed by: FAMILY MEDICINE

## 2022-05-05 PROCEDURE — 1090F PRES/ABSN URINE INCON ASSESS: CPT | Performed by: FAMILY MEDICINE

## 2022-05-05 PROCEDURE — G8417 CALC BMI ABV UP PARAM F/U: HCPCS | Performed by: FAMILY MEDICINE

## 2022-05-05 PROCEDURE — G8432 DEP SCR NOT DOC, RNG: HCPCS | Performed by: FAMILY MEDICINE

## 2022-05-05 NOTE — PROGRESS NOTES
Katheryn Hercules presents today for   Chief Complaint   Patient presents with    Cholesterol Problem    Arthritis       Is someone accompanying this pt? No   Is the patient using any DME equipment during OV? No         Depression Screening:  3 most recent PHQ Screens 11/5/2021   Little interest or pleasure in doing things Not at all   Feeling down, depressed, irritable, or hopeless Not at all   Total Score PHQ 2 0       Learning Assessment:  Learning Assessment 1/27/2020   PRIMARY LEARNER Patient   HIGHEST LEVEL OF EDUCATION - PRIMARY LEARNER  DID NOT GRADUATE [facility] PRIMARY LEARNER NONE   CO-LEARNER CAREGIVER No   PRIMARY LANGUAGE ENGLISH    NEED -   LEARNER PREFERENCE PRIMARY LISTENING   ANSWERED BY self   RELATIONSHIP SELF       Abuse Screening:  Abuse Screening Questionnaire 8/5/2021   Do you ever feel afraid of your partner? N   Are you in a relationship with someone who physically or mentally threatens you? N   Is it safe for you to go home? Y       Fall Screening  Fall Risk Assessment, last 12 mths 7/6/2021   Able to walk? Yes   Fall in past 12 months? 0   Do you feel unsteady? 0   Are you worried about falling 0   Is the gait abnormal? -   Number of falls in past 12 months -   Fall with injury? -       Generalized Anxiety  No flowsheet data found. Health Maintenance Due   Topic Date Due    DTaP/Tdap/Td series (1 - Tdap) Never done    Shingrix Vaccine Age 50> (1 of 2) Never done    Medicare Yearly Exam  03/10/2022   . Health Maintenance reviewed and discussed and ordered per Provider. Vaccines Due   Screenings Due       Katheryn Hercules is updated on all HM    1. \"Have you been to the ER, urgent care clinic since your last visit? Hospitalized since your last visit? \" No    2. \"Have you seen or consulted any other health care providers outside of the [address] [place] [address] since your last visit? \" No     3.  For patients aged 39-70: Has the patient had a colonoscopy / FIT/ Cologuard? Yes - no Care Gap present     If the patient is female:    4. For patients aged 41-77: Has the patient had a mammogram within the past 2 years? NA - based on age    11. For patients aged 21-65: Has the patient had a pap smear?  NA - based on age

## 2022-05-05 NOTE — PROGRESS NOTES
Chief Complaint   Patient presents with    Cholesterol Problem    Arthritis         HPI    Elizabeth Brittle is a 82 y.o. female presenting today for 3 months  follow up of hld, oa. Patient had labs on 4/23/22. Labs reviewed in detail with patient     Patient does not need medication refills today. New concerns today: none      Review of Systems   Constitutional: Negative. HENT: Negative. Respiratory: Negative. Cardiovascular: Negative. All other systems reviewed and are negative. Physical Exam  Vitals and nursing note reviewed. Constitutional:       Appearance: Normal appearance. She is not ill-appearing. HENT:      Head: Normocephalic and atraumatic. Right Ear: External ear normal.      Left Ear: External ear normal.      Nose: Nose normal.      Mouth/Throat:      Mouth: Mucous membranes are moist.   Eyes:      Extraocular Movements: Extraocular movements intact. Conjunctiva/sclera: Conjunctivae normal.   Cardiovascular:      Rate and Rhythm: Normal rate and regular rhythm. Heart sounds: No murmur heard. No friction rub. No gallop. Pulmonary:      Effort: Pulmonary effort is normal.      Breath sounds: Normal breath sounds. No wheezing, rhonchi or rales. Musculoskeletal:         General: Normal range of motion. Cervical back: Normal range of motion. Skin:     General: Skin is warm and dry. Neurological:      Mental Status: She is alert and oriented to person, place, and time. Coordination: Coordination normal.   Psychiatric:         Mood and Affect: Mood normal.         Behavior: Behavior normal.         Thought Content: Thought content normal.         Judgment: Judgment normal.         Diagnoses and all orders for this visit:    1. Hypercholesterolemia  Stable, cont pres tx plan. 2. Neuropathy  Stable, cont pres tx plan. 3. Primary osteoarthritis involving multiple joints  Stable, cont pres tx plan.    Pt encouraged to increase exercise as tolerated. Follow-up and Dispositions    · Return in about 3 months (around 8/5/2022) for high cholesterol, peripheral neuropathy.

## 2022-07-07 ENCOUNTER — OFFICE VISIT (OUTPATIENT)
Dept: FAMILY MEDICINE CLINIC | Age: 87
End: 2022-07-07
Payer: MEDICARE

## 2022-07-07 VITALS
RESPIRATION RATE: 18 BRPM | SYSTOLIC BLOOD PRESSURE: 123 MMHG | HEART RATE: 68 BPM | BODY MASS INDEX: 38.28 KG/M2 | DIASTOLIC BLOOD PRESSURE: 71 MMHG | OXYGEN SATURATION: 98 % | WEIGHT: 223 LBS

## 2022-07-07 DIAGNOSIS — N18.30 STAGE 3 CHRONIC KIDNEY DISEASE, UNSPECIFIED WHETHER STAGE 3A OR 3B CKD (HCC): ICD-10-CM

## 2022-07-07 DIAGNOSIS — Z12.31 ENCOUNTER FOR SCREENING MAMMOGRAM FOR MALIGNANT NEOPLASM OF BREAST: ICD-10-CM

## 2022-07-07 DIAGNOSIS — Z00.00 MEDICARE ANNUAL WELLNESS VISIT, SUBSEQUENT: Primary | ICD-10-CM

## 2022-07-07 DIAGNOSIS — Z71.89 ACP (ADVANCE CARE PLANNING): ICD-10-CM

## 2022-07-07 DIAGNOSIS — G62.9 NEUROPATHY: ICD-10-CM

## 2022-07-07 DIAGNOSIS — R60.0 EDEMA OF BOTH LEGS: ICD-10-CM

## 2022-07-07 PROCEDURE — G0439 PPPS, SUBSEQ VISIT: HCPCS | Performed by: FAMILY MEDICINE

## 2022-07-07 PROCEDURE — 1123F ACP DISCUSS/DSCN MKR DOCD: CPT | Performed by: FAMILY MEDICINE

## 2022-07-07 PROCEDURE — G8417 CALC BMI ABV UP PARAM F/U: HCPCS | Performed by: FAMILY MEDICINE

## 2022-07-07 PROCEDURE — 99497 ADVNCD CARE PLAN 30 MIN: CPT | Performed by: FAMILY MEDICINE

## 2022-07-07 PROCEDURE — 1101F PT FALLS ASSESS-DOCD LE1/YR: CPT | Performed by: FAMILY MEDICINE

## 2022-07-07 PROCEDURE — G8536 NO DOC ELDER MAL SCRN: HCPCS | Performed by: FAMILY MEDICINE

## 2022-07-07 PROCEDURE — G8432 DEP SCR NOT DOC, RNG: HCPCS | Performed by: FAMILY MEDICINE

## 2022-07-07 PROCEDURE — G8427 DOCREV CUR MEDS BY ELIG CLIN: HCPCS | Performed by: FAMILY MEDICINE

## 2022-07-07 RX ORDER — LORATADINE 10 MG/1
10 TABLET ORAL DAILY
Qty: 30 TABLET | Refills: 5 | Status: SHIPPED | OUTPATIENT
Start: 2022-07-07

## 2022-07-07 RX ORDER — FUROSEMIDE 20 MG/1
TABLET ORAL
Qty: 30 TABLET | Refills: 12 | Status: SHIPPED | OUTPATIENT
Start: 2022-07-07

## 2022-07-07 RX ORDER — GABAPENTIN 300 MG/1
300 CAPSULE ORAL
Qty: 60 CAPSULE | Refills: 3 | Status: SHIPPED | OUTPATIENT
Start: 2022-07-07

## 2022-07-07 NOTE — PROGRESS NOTES
This is the Subsequent Medicare Annual Wellness Exam, performed 12 months or more after the Initial AWV or the last Subsequent AWV    I have reviewed the patient's medical history in detail and updated the computerized patient record. Assessment/Plan   Education and counseling provided:  End-of-Life planning (with patient's consent)  Screening Mammography  recommend shingles vaccination; recommend td booster. 1. Medicare annual wellness visit, subsequent  2. ACP (advance care planning)  3. Encounter for screening mammogram for malignant neoplasm of breast  -     [facility] MAMMO BI SCREENING INCL CAD; Future  4. Edema of both legs  -     furosemide (LASIX) 20 mg tablet; TAKE ONE TABLET BY MOUTH DAILY as needed FOR EDEMA, Normal, Disp-30 Tablet, R-12  5. Neuropathy  -     gabapentin (NEURONTIN) 300 mg capsule; Take 1 Capsule by mouth two (2) times daily as needed for Pain. Max Daily Amount: 600 mg., Normal, Disp-60 Capsule, R-3  6. Stage 3 chronic kidney disease, unspecified whether stage 3a or 3b CKD ([facility] [address]       Depression Risk Factor Screening     3 most recent PHQ Screens 11/5/2021   Little interest or pleasure in doing things Not at all   Feeling down, depressed, irritable, or hopeless Not at all   Total Score PHQ 2 0       Alcohol & Drug Abuse Risk Screen    Do you average more than 1 drink per night or more than 7 drinks a week:   No     On any one occasion in the past three months have you have had more than 3 drinks containing alcohol: no           Functional Ability and Level of Safety    Hearing: Hearing is good. patient is to go to ENT . offered hearing aids but declined at this time       Activities of Daily Living: The home contains: no safety equipment.  does how shower chair and bedside commode     Patient needs help with:  phone, transportation, shopping, preparing meals, laundry, housework, managing medications, managing money, eating, dressing, bathing, hygiene, bathroom needs and walking Ambulation: with difficulty, uses a rollater but has wheelchair for long distances      Fall Risk:  Fall Risk Assessment, last 12 mths 7/7/2022   Able to walk? Yes   Fall in past 12 months? 1   Do you feel unsteady? 1   Are you worried about falling 1   Is the gait abnormal? 1   Number of falls in past 12 months 2   Fall with injury?  1      Abuse Screen:  Patient is not abused       Cognitive Screening    Has your family/caregiver stated any concerns about your memory: yes -  son has some concerns mom just doesnt remember as well as she used too  mostly short term      Cognitive Screening: declines eval at this time    Health Maintenance Due     Health Maintenance Due   Topic Date Due    DTaP/Tdap/Td series (1 - Tdap) Never done    Shingrix Vaccine Age 50> (1 of 2) Never done       Patient Care Team   Patient Care Team:  Vitaly Victor MD as PCP - General (Family Medicine)  Vitaly Victor MD as PCP - [facility] Empaneled Provider  Hema Chua MD (Neurology)  Ed Hadley DO (Cardiovascular Disease Physician)  Francisco Barragan MD (Orthopedic Surgery)  Jeffery Peña MD (General Surgery)  Nicole Smith MD (Cardiovascular Disease Physician)  Tristin Toscano MD (Orthopedic Surgery)    History     Patient Active Problem List   Diagnosis Code    High vitamin D level E67.3    Dyslipidemia E78.5    Varicose veins I83.90    Osteoarthritis M19.90    Osteoporosis M81.0    Status post right hip replacement Z96.641    Impaired mobility and ADLs Z74.09, Z78.9    Peripheral neuropathy G62.9    Postoperative anemia due to acute blood loss D62    Osteoarthritis of right hip M16.11    Decreased calculated GFR R94.4    History of kidney stones Z87.442    Numbness in both hands R20.0    Small bowel obstruction ([address] K56.609    Hypercholesterolemia E78.00    Fx intertrochanteric hip ([address] S72.143A    Muscle weakness (generalized) M62.81    Difficulty in walking, not elsewhere classified R26.2  ACP (advance care planning) Z71.89    Severe obesity (BMI 35.0-39. 9) E66.01    Age-related cataract of both eyes H25.9    Chronic renal disease, stage III N18.30     Past Medical History:   Diagnosis Date    Decreased calculated GFR 12/5/2014    High vitamin D level 12/6/2014    Vitamin D 25-Hydroxy (12/06/2014) = 138.9     History of echocardiogram 08/29/2014    EF 60%. No WMA. Mild conc LVH. Gr 1 DDfx. Mild RVE. Mild CATRACHO. Sm right & sm left pleural effusion.  Hypercholesterolemia     Lower extremity venous duplex 10/02/2014    No DVT bilaterally.  Numbness in both hands     Osteoarthritis     Osteoarthritis of right hip     Osteoporosis     Peripheral neuropathy     Peripheral vascular disease (HCC)     Postoperative anemia due to acute blood loss     Vaginal fibroids     resolved s/p hyst    Varicose veins       Past Surgical History:   Procedure Laterality Date    HX CHOLECYSTECTOMY      HX HERNIA REPAIR  2010    abdominal, had 2nd surgery for infection    HX HIP FRACTURE TX Left     HX HIP REPLACEMENT Right 12/02/2014    S/P Right total hip replacement (12/02/2014 - Dr. Jenna Moncada)   [facility]    HX OTHER SURGICAL  03/07/2018    Left foot toe surgery-DANA VARNER @ 1 foot 2 foot    ME TOTAL KNEE ARTHROPLASTY  2006    left    ME TOTAL KNEE ARTHROPLASTY  2001    right    VASCULAR SURGERY PROCEDURE UNLIST      Bilateral leg vein stripping     Current Outpatient Medications   Medication Sig Dispense Refill    furosemide (LASIX) 20 mg tablet TAKE ONE TABLET BY MOUTH DAILY as needed FOR EDEMA 30 Tablet 12    gabapentin (NEURONTIN) 300 mg capsule Take 1 Capsule by mouth two (2) times daily as needed for Pain. Max Daily Amount: 600 mg. 60 Capsule 3    loratadine (CLARITIN) 10 mg tablet Take 1 Tablet by mouth daily. 30 Tablet 5    celecoxib (CELEBREX) 200 mg capsule Take 1 Capsule by mouth daily.  60 Capsule 12    diclofenac (Voltaren) 1 % gel Apply 4 g to affected area four (4) times daily. 5 Each 5    atorvastatin (LIPITOR) 40 mg tablet Take 1 Tablet by mouth daily. 30 Tablet 12    fluticasone propionate (FLONASE) 50 mcg/actuation nasal spray Use 2 spray(s) in each nostril once daily 16 g 12    miscellaneous medical supply misc DISPENSE (1) BEDSIDE COMMODE. DX:M15.9,Z74.09,Z96.641,M62.81,Z91.81 1 Each 0    VITAMIN D3 1,000 unit tablet       cyanocobalamin (VITAMIN B-12) 500 mcg tablet Take 500 mcg by mouth daily.          No Known Allergies    Family History   Problem Relation Age of Onset   Beatrice Hartmangomery Arthritis-rheumatoid Mother     Heart Attack Mother    Beatrice Hartmangomery Arthritis-rheumatoid Father     Other Father         gangrene    OSTEOARTHRITIS Sister     Other Brother         pna    OSTEOARTHRITIS Brother     Cancer Daughter         in remission    OSTEOARTHRITIS Brother     Diabetes Brother     OSTEOARTHRITIS Sister      Social History     Tobacco Use    Smoking status: Former Smoker     Years: 1.00     Types: Cigarettes     Quit date: 1982     Years since quittin.5    Smokeless tobacco: Never Used   Substance Use Topics    Alcohol use: No     Comment: Quit alcohol in Yvette Mcguire LPN     Signed By: Alexandra Good MD     2022

## 2022-07-07 NOTE — PROGRESS NOTES
Advance Care Planning     Advance Care Planning (ACP) Physician/NP/PA Conversation      Date of Conversation: 7/7/2022  Conducted with: Patient with Decision Making Capacity    Healthcare Decision Maker:   No healthcare decision makers have been documented. Click here to complete [employer] including selection of the Healthcare Decision Maker Relationship (ie \"Primary\")    Today we documented Decision Maker(s) consistent with Legal Next of Kin hierarchy. Care Preferences:    Hospitalization: \"If your health worsens and it becomes clear that your chance of recovery is unlikely, what would be your preference regarding hospitalization? \"  The patient is unsure. Ventilation: \"If you were unable to breathe on your own and your chance of recovery was unlikely, what would be your preference about the use of a ventilator (breathing machine) if it was available to you? \"   The patient is unsure. Resuscitation: \"In the event your heart stopped as a result of an underlying serious health condition, would you want attempts to be made to restart your heart, or would you prefer a natural death? \"   The patient is unsure.         Conversation Outcomes / Follow-Up Plan:   ACP in process - information provided, considering goals and options  Reviewed DNR/DNI and patient elects Full Code (Attempt Resuscitation)     Length of Voluntary ACP Conversation in minutes:  16 minutes    Samir Lemus MD

## 2022-07-07 NOTE — PATIENT INSTRUCTIONS

## 2022-08-06 ENCOUNTER — APPOINTMENT (OUTPATIENT)
Dept: CT IMAGING | Age: 87
End: 2022-08-06
Attending: STUDENT IN AN ORGANIZED HEALTH CARE EDUCATION/TRAINING PROGRAM
Payer: MEDICAID

## 2022-08-06 ENCOUNTER — HOSPITAL ENCOUNTER (EMERGENCY)
Age: 87
Discharge: HOME | End: 2022-08-06
Attending: STUDENT IN AN ORGANIZED HEALTH CARE EDUCATION/TRAINING PROGRAM
Payer: MEDICAID

## 2022-08-06 VITALS
HEART RATE: 82 BPM | SYSTOLIC BLOOD PRESSURE: 137 MMHG | HEIGHT: 64 IN | DIASTOLIC BLOOD PRESSURE: 83 MMHG | BODY MASS INDEX: 38.41 KG/M2 | TEMPERATURE: 98.1 F | RESPIRATION RATE: 16 BRPM | OXYGEN SATURATION: 100 % | WEIGHT: 225 LBS

## 2022-08-06 DIAGNOSIS — S01.01XA LACERATION OF SCALP, INITIAL ENCOUNTER: ICD-10-CM
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
## 2022-08-06 PROCEDURE — 74011250636 HC RX REV CODE- 250/636: Performed by: STUDENT IN AN ORGANIZED HEALTH CARE EDUCATION/TRAINING PROGRAM

## 2022-08-06 PROCEDURE — 90715 TDAP VACCINE 7 YRS/> IM: CPT | Performed by: STUDENT IN AN ORGANIZED HEALTH CARE EDUCATION/TRAINING PROGRAM

## 2022-08-06 PROCEDURE — 99284 EMERGENCY DEPT VISIT MOD MDM: CPT

## 2022-08-06 PROCEDURE — 90471 IMMUNIZATION ADMIN: CPT

## 2022-08-06 PROCEDURE — 70450 CT HEAD/BRAIN W/O DYE: CPT

## 2022-08-06 PROCEDURE — 75810000293 HC SIMP/SUPERF WND  RPR

## 2022-08-06 PROCEDURE — 72125 CT NECK SPINE W/O DYE: CPT

## 2022-08-06 RX ADMIN — TETANUS TOXOID, REDUCED DIPHTHERIA TOXOID AND ACELLULAR PERTUSSIS VACCINE, ADSORBED 0.5 ML: 5; 2.5; 8; 8; 2.5 SUSPENSION INTRAMUSCULAR at 07:42

## 2022-08-06 NOTE — ED TRIAGE NOTES
Pt arrived to ED from home for a fall this am. Pt lost her balance when trying to go to restroom, hitting her head, denies LOC, denies blood thinners

## 2022-08-06 NOTE — Clinical Note
Carline Nguyen was seen and treated in our emergency department on 8/6/2022. She is seen with her son, Nick Hercules who accompanied her today with a day off of work.     Mook Szymanski MD

## 2022-08-06 NOTE — DISCHARGE INSTRUCTIONS
Return to emergency department in 7 days for removal of the staples, or see your primary care doctor on Friday.

## 2022-08-06 NOTE — ED PROVIDER NOTES
EMERGENCY DEPARTMENT HISTORY AND PHYSICAL EXAM    7:21 AM    Date: 8/6/2022  Patient Name: Omkar Vilchis    History of Presenting Illness     Chief Complaint   Patient presents with    Fall    Head Laceration       History Provided By: Patient  Location/Duration/Severity/Modifying factors   HPI  Omkar Vilchis is a 82 y.o. female with noted past medical problems presenting after a fall. She was up going to the bathroom this morning, walking backwards when she tripped over her shoes, falling backwards and hitting the back of her head on the ground or a neck table. She did not lose consciousness, and is not on any blood thinners. She has a laceration to the posterior scalp, her son applied pressure, cleaned her up and brought her in for evaluation. She does not have any headache, changes in vision, neck pain, back pain, extremity pain, chest pain or abdominal pain. No other injuries from the fall. She has not taken anything for this. No other alleviating or aggravating factors. Unsure when her last tetanus was updated. PCP: Gely Gutierrez MD    Current Outpatient Medications   Medication Sig Dispense Refill    furosemide (LASIX) 20 mg tablet TAKE ONE TABLET BY MOUTH DAILY as needed FOR EDEMA 30 Tablet 12    gabapentin (NEURONTIN) 300 mg capsule Take 1 Capsule by mouth two (2) times daily as needed for Pain. Max Daily Amount: 600 mg. 60 Capsule 3    loratadine (CLARITIN) 10 mg tablet Take 1 Tablet by mouth daily. 30 Tablet 5    celecoxib (CELEBREX) 200 mg capsule Take 1 Capsule by mouth daily. 60 Capsule 12    diclofenac (Voltaren) 1 % gel Apply 4 g to affected area four (4) times daily. 5 Each 5    atorvastatin (LIPITOR) 40 mg tablet Take 1 Tablet by mouth daily. 30 Tablet 12    fluticasone propionate (FLONASE) 50 mcg/actuation nasal spray Use 2 spray(s) in each nostril once daily 16 g 12    miscellaneous medical supply misc DISPENSE (1) BEDSIDE COMMODE. DX:M15.9,Z74.09,Z96.641,M62.81,Z91.81 1 Each 0 VITAMIN D3 1,000 unit tablet       cyanocobalamin (VITAMIN B-12) 500 mcg tablet Take 500 mcg by mouth daily. Past History     Past Medical History:  Past Medical History:   Diagnosis Date    Decreased calculated GFR 12/5/2014    High vitamin D level 12/6/2014    Vitamin D 25-Hydroxy (12/06/2014) = 138.9     History of echocardiogram 08/29/2014    EF 60%. No WMA. Mild conc LVH. Gr 1 DDfx. Mild RVE. Mild CATRACHO. Sm right & sm left pleural effusion. Hypercholesterolemia     Lower extremity venous duplex 10/02/2014    No DVT bilaterally.     Numbness in both hands     Osteoarthritis     Osteoarthritis of right hip     Osteoporosis     Peripheral neuropathy     Peripheral vascular disease (HCC)     Postoperative anemia due to acute blood loss     Vaginal fibroids     resolved s/p hyst    Varicose veins        Past Surgical History:  Past Surgical History:   Procedure Laterality Date    HX CHOLECYSTECTOMY      HX HERNIA REPAIR  2010    abdominal, had 2nd surgery for infection    HX HIP FRACTURE TX Left     HX HIP REPLACEMENT Right 12/02/2014    S/P Right total hip replacement (12/02/2014 - Dr. Gasper Huerta)    [address] [place] [address],[address]    HX OTHER SURGICAL  03/07/2018    Left foot toe surgery-DANA VARNER @ 1 foot 2 foot    CT TOTAL KNEE ARTHROPLASTY  2006    left    CT TOTAL KNEE ARTHROPLASTY  2001    right    VASCULAR SURGERY PROCEDURE UNLIST      Bilateral leg vein stripping       Family History:  Family History   Problem Relation Age of Onset    Arthritis-rheumatoid Mother     Heart Attack Mother     Arthritis-rheumatoid Father     Other Father         gangrene    OSTEOARTHRITIS Sister     Other Brother         pna    OSTEOARTHRITIS Brother     Cancer Daughter         in remission    OSTEOARTHRITIS Brother     Diabetes Brother     OSTEOARTHRITIS Sister        Social History:  Social History     Tobacco Use    Smoking status: Former     Years: 1.00     Types: Cigarettes     Quit date: 1/1/1982     Years since quittin.6    Smokeless tobacco: Never   Substance Use Topics    Alcohol use: No     Comment: Quit alcohol in     Drug use: Yes     Types: Prescription, OTC     Comment: prescribed       Allergies:  No Known Allergies    I reviewed and confirmed the above information with patient and updated as necessary. Review of Systems     Review of Systems   Constitutional:  Negative for diaphoresis and fever. HENT:  Negative for ear pain and sore throat. Eyes:         No acute change in vision   Respiratory:  Negative for cough and shortness of breath. Cardiovascular:  Negative for chest pain and leg swelling. Gastrointestinal:  Negative for abdominal pain and vomiting. Genitourinary:  Negative for dysuria. Musculoskeletal:  Negative for neck pain. Skin:  Positive for wound. Neurological:  Negative for weakness and headaches. Physical Exam   Visit Vitals  /83 (BP 1 Location: Left arm, BP Patient Position: At rest;Sitting)   Pulse 82   Temp 98.1 °F (36.7 °C)   Resp 16   Ht 5' 4\" (1.626 m)   Wt 102.1 kg (225 lb)   LMP 1985   SpO2 100%   BMI 38.62 kg/m²       Physical Exam  Vitals and nursing note reviewed. Constitutional:       Comments: Elderly adult female resting comfortably, pleasant   HENT:      Head:      Comments: 2 cm laceration to the occiput     Mouth/Throat:      Mouth: Mucous membranes are moist.   Eyes:      Pupils: Pupils are equal, round, and reactive to light. Cardiovascular:      Rate and Rhythm: Normal rate and regular rhythm. Pulses: Normal pulses. Pulmonary:      Effort: Pulmonary effort is normal.      Breath sounds: Normal breath sounds. Abdominal:      Palpations: Abdomen is soft. Tenderness: There is no abdominal tenderness. Musculoskeletal:         General: No signs of injury. Normal range of motion. Cervical back: Normal and normal range of motion. No tenderness.       Thoracic back: Normal.      Lumbar back: Normal.   Skin: General: Skin is warm. Neurological:      General: No focal deficit present. Mental Status: She is alert and oriented to person, place, and time. Mental status is at baseline. Cranial Nerves: No cranial nerve deficit. Sensory: No sensory deficit. Motor: No weakness. Coordination: Coordination normal.       Diagnostic Study Results     Labs -  No results found for this or any previous visit (from the past 24 hour(s)). Radiologic Studies -   CT SPINE CERV WO CONT   Final Result      Negative for acute sequelae of trauma. All CT scans at this facility are performed using dose optimization technique as   appropriate to the performed exam, to include automated exposure control,   adjustment of the mA and/or kV according to patient's size (Including   appropriate matching for site-specific examinations), or use of iterative   reconstruction technique. CT HEAD WO CONT   Final Result      Well preserved intracranial appearance for age. No acute intracranial process. All CT scans at this facility are performed using dose optimization technique as   appropriate to the performed exam, to include automated exposure control,   adjustment of the mA and/or kV according to patient's size (Including   appropriate matching for site-specific examinations), or use of iterative   reconstruction technique. Medical Decision Making   I am the first provider for this patient. I reviewed the vital signs, available nursing notes, past medical history, past surgical history, family history and social history. Vital Signs-Reviewed the patient's vital signs.     Records Reviewed: Nursing Notes and Old Medical Records (Time of Review: 7:21 AM)    Provider Notes (Medical Decision Making):   MDM  74-year-old female here for mechanical fall, consider laceration, abrasion, closed head injury, subdural hematoma, others    ED Course: Progress Notes, Reevaluation, and Consults:  Patient afebrile and hemodynamically normal  Resting comfortably, neurologic exam is reassuring    She has a 2 cm laceration on the occiput, hemostatic, cleaned and staples, please see separate procedure note, tolerated well    Will update tetanus, obtain CT head and C-spine. CT imaging without acute process  Patient was discharged home in stable condition, return precautions were given, will follow up with her PCP this week or return in 7 days here for staple removal.             Wound Repair    Date/Time: 8/6/2022 8:28 AM  Performed by: attendingPreparation: skin prepped with Shur-Clens  Location details: scalp  Wound length:2.5 cm or less  Foreign bodies: no foreign bodies  Irrigation solution: saline  Debridement: none  Skin closure: staples  Number of sutures: 2  Approximation: close  Patient tolerance: patient tolerated the procedure well with no immediate complications  My total time at bedside, performing this procedure was 1-15 minutes. Diagnosis     Clinical Impression:   1. Fall from standing, initial encounter    2. Laceration of scalp, initial encounter      Disposition: Home    Follow-up Information       Follow up With Specialties Details Why [place] [address] EMERGENCY DEPT Emergency Medicine  In 7 days for removal of your stitches. [facility]  [phone]             Patient's Medications   Start Taking    No medications on file   Continue Taking    ATORVASTATIN (LIPITOR) 40 MG TABLET    Take 1 Tablet by mouth daily. CELECOXIB (CELEBREX) 200 MG CAPSULE    Take 1 Capsule by mouth daily. CYANOCOBALAMIN (VITAMIN B-12) 500 MCG TABLET    Take 500 mcg by mouth daily. DICLOFENAC (VOLTAREN) 1 % GEL    Apply 4 g to affected area four (4) times daily.     FLUTICASONE PROPIONATE (FLONASE) 50 MCG/ACTUATION NASAL SPRAY    Use 2 spray(s) in each nostril once daily    FUROSEMIDE (LASIX) 20 MG TABLET    TAKE ONE TABLET BY MOUTH DAILY as needed FOR EDEMA    GABAPENTIN (NEURONTIN) 300 MG CAPSULE    Take 1 Capsule by mouth two (2) times daily as needed for Pain. Max Daily Amount: 600 mg. LORATADINE (CLARITIN) 10 MG TABLET    Take 1 Tablet by mouth daily. MISCELLANEOUS MEDICAL SUPPLY MISC    DISPENSE (1) BEDSIDE COMMODE. DX:M15.9,Z74.09,Z96.641,M62.81,Z91.81    VITAMIN D3 1,000 UNIT TABLET       These Medications have changed    No medications on file   Stop Taking    No medications on file       Rosaura Hogue MD   Emergency Medicine   August 6, 2022, 7:21 AM     This note is dictated utilizing Dragon voice recognition software. Unfortunately this leads to occasional typographical errors using the voice recognition. I apologize in advance if the situation occurs. If questions occur please do not hesitate to contact me directly.     Av Will MD

## 2022-08-13 ENCOUNTER — HOSPITAL ENCOUNTER (EMERGENCY)
Age: 87
Discharge: HOME | End: 2022-08-13
Attending: EMERGENCY MEDICINE
Payer: MEDICAID

## 2022-08-13 VITALS
OXYGEN SATURATION: 99 % | DIASTOLIC BLOOD PRESSURE: 72 MMHG | HEART RATE: 64 BPM | BODY MASS INDEX: 20.49 KG/M2 | RESPIRATION RATE: 18 BRPM | HEIGHT: 64 IN | SYSTOLIC BLOOD PRESSURE: 116 MMHG | TEMPERATURE: 98.1 F | WEIGHT: 120 LBS

## 2022-08-13 DIAGNOSIS — Z48.02 ENCOUNTER FOR STAPLE REMOVAL: Primary | ICD-10-CM

## 2022-08-13 PROCEDURE — 75810000275 HC EMERGENCY DEPT VISIT NO LEVEL OF CARE

## 2022-08-13 NOTE — ED PROVIDER NOTES
EMERGENCY DEPARTMENT HISTORY AND PHYSICAL EXAM      Date: 2022  Patient Name: Molly Rivers      History of Presenting Illness     Chief Complaint   Patient presents with    Staple Removal     In her head       History Provided By: Patient  Location/Duration/Severity/Modifying factors   81-year-old female presenting for removal of staples. She had them placed 7 days ago after a fall and sustained a scalp laceration. She had 2 placed at the time. She has been doing well with no complaints. There are no other complaints, changes, or physical findings at this time. PCP: Angelica Naqvi MD    Current Outpatient Medications   Medication Sig Dispense Refill    furosemide (LASIX) 20 mg tablet TAKE ONE TABLET BY MOUTH DAILY as needed FOR EDEMA 30 Tablet 12    gabapentin (NEURONTIN) 300 mg capsule Take 1 Capsule by mouth two (2) times daily as needed for Pain. Max Daily Amount: 600 mg. 60 Capsule 3    loratadine (CLARITIN) 10 mg tablet Take 1 Tablet by mouth daily. 30 Tablet 5    celecoxib (CELEBREX) 200 mg capsule Take 1 Capsule by mouth daily. 60 Capsule 12    diclofenac (Voltaren) 1 % gel Apply 4 g to affected area four (4) times daily. 5 Each 5    atorvastatin (LIPITOR) 40 mg tablet Take 1 Tablet by mouth daily. 30 Tablet 12    fluticasone propionate (FLONASE) 50 mcg/actuation nasal spray Use 2 spray(s) in each nostril once daily 16 g 12    miscellaneous medical supply misc DISPENSE (1) BEDSIDE COMMODE. DX:M15.9,Z74.09,Z96.641,M62.81,Z91.81 1 Each 0    VITAMIN D3 1,000 unit tablet       cyanocobalamin (VITAMIN B-12) 500 mcg tablet Take 500 mcg by mouth daily. Past History     Past Medical History:  Past Medical History:   Diagnosis Date    Decreased calculated GFR 2014    High vitamin D level 2014    Vitamin D 25-Hydroxy (2014) = 138.9     History of echocardiogram 2014    EF 60%. No WMA. Mild conc LVH. Gr 1 DDfx. Mild RVE. Mild CATRACHO. Sm right & sm left pleural effusion. Hypercholesterolemia     Lower extremity venous duplex 10/02/2014    No DVT bilaterally. Numbness in both hands     Osteoarthritis     Osteoarthritis of right hip     Osteoporosis     Peripheral neuropathy     Peripheral vascular disease (HCC)     Postoperative anemia due to acute blood loss     Vaginal fibroids     resolved s/p hyst    Varicose veins        Past Surgical History:  Past Surgical History:   Procedure Laterality Date    HX CHOLECYSTECTOMY      HX HERNIA REPAIR  2010    abdominal, had 2nd surgery for infection    HX HIP FRACTURE TX Left     HX HIP REPLACEMENT Right 2014    S/P Right total hip replacement (2014 - Dr. Kimberlyn Barragan)    [address]    HX OTHER SURGICAL  2018    Left foot toe surgery-DANA VARNER @ 1 foot 2 foot    AK TOTAL KNEE ARTHROPLASTY  2006    left    AK TOTAL KNEE ARTHROPLASTY  2001    right    VASCULAR SURGERY PROCEDURE UNLIST      Bilateral leg vein stripping       Family History:  Family History   Problem Relation Age of Onset    Arthritis-rheumatoid Mother     Heart Attack Mother     Arthritis-rheumatoid Father     Other Father         gangrene    OSTEOARTHRITIS Sister     Other Brother         pna    OSTEOARTHRITIS Brother     Cancer Daughter         in remission    OSTEOARTHRITIS Brother     Diabetes Brother     OSTEOARTHRITIS Sister        Social History:  Social History     Tobacco Use    Smoking status: Former     Years: 1.00     Types: Cigarettes     Quit date: 1982     Years since quittin.6    Smokeless tobacco: Never   Substance Use Topics    Alcohol use: No     Comment: Quit alcohol in     Drug use: Yes     Types: Prescription, OTC     Comment: prescribed       Allergies:  No Known Allergies      Review of Systems     Review of Systems   Constitutional:  Negative for fever. HENT:  Negative for congestion. Respiratory:  Negative for shortness of breath. Cardiovascular:  Negative for chest pain.    Genitourinary: Negative for difficulty urinating. Musculoskeletal:  Negative for back pain and neck pain. Neurological:  Negative for dizziness and headaches. Physical Exam     Physical Exam  Constitutional:       Appearance: Normal appearance. Comments: . Alert and well-appearing adult elderly female not in any distress   HENT:      Head: Normocephalic and atraumatic. Comments: To the crown/occipital scalp but there are 2 staples in place approximating approximately a 1 cm wound. It is scabbed over and healing. There is no bleeding. There is no dehiscence     Nose: Nose normal.   Eyes:      Conjunctiva/sclera: Conjunctivae normal.   Pulmonary:      Effort: Pulmonary effort is normal.   Abdominal:      General: Abdomen is flat. Musculoskeletal:         General: Normal range of motion. Cervical back: Neck supple. Skin:     General: Skin is warm and dry. Neurological:      General: No focal deficit present. Mental Status: She is alert. Lab and Diagnostic Study Results     Labs -  No results found for this or any previous visit (from the past 24 hour(s)). Radiologic Studies -   No orders to display         Medical Decision Making and ED Course   - I am the first and primary provider for this patient AND AM THE PRIMARY PROVIDER OF RECORD. - I reviewed the vital signs, available nursing notes, past medical history, past surgical history, family history and social history. - Initial assessment performed. The patients presenting problems have been discussed, and the staff are in agreement with the care plan formulated and outlined with them. I have encouraged them to ask questions as they arise throughout their visit. Vital Signs-Reviewed the patient's vital signs.     Patient Vitals for the past 24 hrs:   Temp Pulse Resp BP SpO2   08/13/22 0858 98.1 °F (36.7 °C) 64 18 116/72 99 %         Nursing notes and pertinent past medical history including imaging and labs have been reviewed (if available)    ED Course:          Provider Notes (Medical Decision Making):     MDM  Number of Diagnoses or Management Options  Diagnosis management comments: 2 staples were removed intact. Gave standard wound precautions. Patient advised to follow-up with primary care doctor for routine needs, return if she develops any signs of infection. Wound looks good today no sign of infection or dehiscence. This was updated at her last visit.         _________________________________________________________________    At this time, patient is stable and appropriate for discharge home. Patient demonstrates understanding of current diagnoses and is in agreement with the treatment plan. They are advised that while the likelihood of serious underlying condition is low at this point given the evaluation performed today, we cannot fully rule it out. They are advised to immediately return with any new symptoms or worsening of current condition. Any Incidental findings were noted on the patient's discharge paperwork as well as verbally directly to the patient, and the appropriate follow-up was given to the patient as far as instructions on testing needed as well as the timeframe. All questions have been answered. Patient is given educational material regarding their diagnoses, including danger symptoms and when to return to the ED. This note was dictated utilizing Dragon voice recognition software. Unfortunately this leads to occasional typographical errors. I apologize in advance if the situation occurs. If questions occur please do not hesitate to contact me directly. Kamla Isaacs DO          Procedures and Critical Care   Performed by: Kamla Isaacs DO  Procedures         Kamla Isaacs DO      Diagnosis:   1.  Encounter for staple removal          Disposition: Discharge    Follow-up Information       Follow up With Specialties Details Why Contact Info    Catherine Johnson MD Family Medicine  As needed [address] [facility] EMERGENCY DEPT Emergency Medicine  As needed, If symptoms worsen; or [facility] Emergency Department [address] [facility]  [phone]            Patient's Medications   Start Taking    No medications on file   Continue Taking    ATORVASTATIN (LIPITOR) 40 MG TABLET    Take 1 Tablet by mouth daily. CELECOXIB (CELEBREX) 200 MG CAPSULE    Take 1 Capsule by mouth daily. CYANOCOBALAMIN (VITAMIN B-12) 500 MCG TABLET    Take 500 mcg by mouth daily. DICLOFENAC (VOLTAREN) 1 % GEL    Apply 4 g to affected area four (4) times daily. FLUTICASONE PROPIONATE (FLONASE) 50 MCG/ACTUATION NASAL SPRAY    Use 2 spray(s) in each nostril once daily    FUROSEMIDE (LASIX) 20 MG TABLET    TAKE ONE TABLET BY MOUTH DAILY as needed FOR EDEMA    GABAPENTIN (NEURONTIN) 300 MG CAPSULE    Take 1 Capsule by mouth two (2) times daily as needed for Pain. Max Daily Amount: 600 mg. LORATADINE (CLARITIN) 10 MG TABLET    Take 1 Tablet by mouth daily. MISCELLANEOUS MEDICAL SUPPLY MISC    DISPENSE (1) BEDSIDE COMMODE. DX:M15.9,Z74.09,Z96.641,M62.81,Z91.81    VITAMIN D3 1,000 UNIT TABLET       These Medications have changed    No medications on file   Stop Taking    No medications on file       Patient seen in the context of the Novel Coronavirus (COVID19) pandemic, utilizing contemporary protocols and evidence based on the most up to date available evidence, understanding that the current evidence has the potential to change as additional information becomes available. This note is dictated utilizing Dragon voice recognition software. Unfortunately this leads to occasional typographical errors using the voice recognition. I apologize in advance if the situation occurs. If questions occur please do not hesitate to contact me directly.     Junior Rivera, DO

## 2022-08-13 NOTE — ED NOTES
Assumed care of patient, A&Ox4, Resp even and unlabored, NAD noted or indicated. Pt presents with complaints of staples removed from head after a fall last week, denies nausea, vomiting, SOB, and chest pain. Pt appears well. At baseline and self sufficient. Pt is able to ambulate with a wheelchair. Pt MD SHAWN at bedside. This RN to continue to monitor and maintain safety. Past Medical History:   Diagnosis Date    Decreased calculated GFR 12/5/2014    High vitamin D level 12/6/2014    Vitamin D 25-Hydroxy (12/06/2014) = 138.9     History of echocardiogram 08/29/2014    EF 60%. No WMA. Mild conc LVH. Gr 1 DDfx. Mild RVE. Mild CATRACHO. Sm right & sm left pleural effusion. Hypercholesterolemia     Lower extremity venous duplex 10/02/2014    No DVT bilaterally.     Numbness in both hands     Osteoarthritis     Osteoarthritis of right hip     Osteoporosis     Peripheral neuropathy     Peripheral vascular disease (HCC)     Postoperative anemia due to acute blood loss     Vaginal fibroids     resolved s/p hyst    Varicose veins        Past Surgical History:   Procedure Laterality Date    HX CHOLECYSTECTOMY      HX HERNIA REPAIR  2010    abdominal, had 2nd surgery for infection    HX HIP FRACTURE TX Left     HX HIP REPLACEMENT Right 12/02/2014    S/P Right total hip replacement (12/02/2014 - Dr. Malu Corrales)    [address]    HX OTHER SURGICAL  03/07/2018    Left foot toe surgery-DANA VARNER @ 1 foot 2 foot    ME TOTAL KNEE ARTHROPLASTY  2006    left    ME TOTAL KNEE ARTHROPLASTY  2001    right    VASCULAR SURGERY PROCEDURE UNLIST      Bilateral leg vein stripping

## 2022-08-22 ENCOUNTER — VIRTUAL VISIT (OUTPATIENT)
Dept: FAMILY MEDICINE CLINIC | Age: 87
End: 2022-08-22
Payer: MEDICARE

## 2022-08-22 DIAGNOSIS — R05.9 COUGH: ICD-10-CM

## 2022-08-22 DIAGNOSIS — G62.9 NEUROPATHY: ICD-10-CM

## 2022-08-22 DIAGNOSIS — R09.89 CHEST CONGESTION: ICD-10-CM

## 2022-08-22 DIAGNOSIS — E78.00 HYPERCHOLESTEROLEMIA: Primary | ICD-10-CM
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
## 2022-08-22 PROBLEM — N18.30 CHRONIC RENAL DISEASE, STAGE III (HCC): Status: RESOLVED | Noted: 2022-07-07 | Resolved: 2022-08-22

## 2022-08-22 PROCEDURE — 99214 OFFICE O/P EST MOD 30 MIN: CPT | Performed by: FAMILY MEDICINE

## 2022-08-22 PROCEDURE — G8536 NO DOC ELDER MAL SCRN: HCPCS | Performed by: FAMILY MEDICINE

## 2022-08-22 PROCEDURE — 1090F PRES/ABSN URINE INCON ASSESS: CPT | Performed by: FAMILY MEDICINE

## 2022-08-22 PROCEDURE — 1101F PT FALLS ASSESS-DOCD LE1/YR: CPT | Performed by: FAMILY MEDICINE

## 2022-08-22 PROCEDURE — G8427 DOCREV CUR MEDS BY ELIG CLIN: HCPCS | Performed by: FAMILY MEDICINE

## 2022-08-22 PROCEDURE — G8432 DEP SCR NOT DOC, RNG: HCPCS | Performed by: FAMILY MEDICINE

## 2022-08-22 PROCEDURE — G8420 CALC BMI NORM PARAMETERS: HCPCS | Performed by: FAMILY MEDICINE

## 2022-08-22 PROCEDURE — 1123F ACP DISCUSS/DSCN MKR DOCD: CPT | Performed by: FAMILY MEDICINE

## 2022-08-22 NOTE — PROGRESS NOTES
Shelton Ferrer presents today for   Chief Complaint   Patient presents with    Cholesterol Problem    Peripheral Neuropathy       Shelton Ferrer preferred language for health care discussion is english/other. Is someone accompanying this pt? Yes son     Is the patient using any DME equipment during OV? Rollater walker     Depression Screening:  3 most recent PHQ Screens 8/13/2022   Little interest or pleasure in doing things Not at all   Feeling down, depressed, irritable, or hopeless Not at all   Total Score PHQ 2 0       Learning Assessment:  Learning Assessment 1/27/2020   PRIMARY LEARNER Patient   HIGHEST LEVEL OF EDUCATION - PRIMARY LEARNER  DID NOT GRADUATE [facility] PRIMARY LEARNER NONE   CO-LEARNER CAREGIVER No   PRIMARY LANGUAGE ENGLISH    NEED -   LEARNER PREFERENCE PRIMARY LISTENING   ANSWERED BY self   RELATIONSHIP SELF       Abuse Screening:  Abuse Screening Questionnaire 8/5/2021   Do you ever feel afraid of your partner? N   Are you in a relationship with someone who physically or mentally threatens you? N   Is it safe for you to go home? Y       Generalized Anxiety  No flowsheet data found. Health Maintenance Due   Topic Date Due    Shingrix Vaccine Age 49> (1 of 2) Never done    COVID-19 Vaccine (4 - Booster for Moderna series) 04/09/2022   . Health Maintenance reviewed and discussed and ordered per Provider. VACCINES DUE   SCREENINGS DUE       Shelton Ferrer is updated on all HM    1. \"Have you been to the ER, urgent care clinic since your last visit? Hospitalized since your last visit? \" Yes When: 08/06/22 and 08/13/22  Where: [facility] ED  Reason for visit: .    2. \"Have you seen or consu lted any other health care providers outside of the [address] [place] [address] since your last visit? \" No     3. For patients aged 39-70: Has the patient had a colonoscopy / FIT/ Cologuard? Yes - no Care Gap present     If the patient is female:    4.  For patients aged 41-77: Has the patient had a mammogram within the past 2 years? Yes - no Care Gap present    5. For patients aged 21-65: Has the patient had a pap smear?  Yes - no Care Gap present

## 2022-08-22 NOTE — PROGRESS NOTES
Tyree Schultz is a 82 y.o. female who was seen by synchronous (real-time) audio-video technology on 8/22/2022 for Cholesterol Problem and Peripheral Neuropathy        Assessment & Plan:   Diagnoses and all orders for this visit:    1. Hypercholesterolemia  -     METABOLIC PANEL, COMPREHENSIVE; Future  -     LIPID PANEL; Future    2. Neuropathy  Stable, cont pres tx plan. 3. Fall, initial encounter  Staples removed already, recovering well    4. Cough  5. Chest congestion  Pt's son will take her to an urgent care for covid testing. The complexity of medical decision making for this visit is moderate   Follow-up and Dispositions    Return in about 3 months (around 11/22/2022) for diabetes, peripheral neuropathy, lab review. 712  Subjective:     Patient contacted via [employer] virtual visit. Tyree Schultz is a 82 y.o. female presenting today for 3 months follow up of hld, peripheral neuropathy. Pt seen at the ER earlier this month for laceration related to a fall. Pt c/o cough and chest congestion that started at least 3 days ago. Pt's son bought otc cold meds; these are helping. Pt was approved for a pca; her son is taking a class to get trained to be able to do this for her. Since he works from home, he can do his primary job and care for her at the same time. Patient does not need medication refills today. Prior to Admission medications    Medication Sig Start Date End Date Taking? Authorizing Provider   furosemide (LASIX) 20 mg tablet TAKE ONE TABLET BY MOUTH DAILY as needed FOR EDEMA 7/7/22  Yes Charly Freedman MD   gabapentin (NEURONTIN) 300 mg capsule Take 1 Capsule by mouth two (2) times daily as needed for Pain. Max Daily Amount: 600 mg. 7/7/22  Yes Charly Freedman MD   loratadine (CLARITIN) 10 mg tablet Take 1 Tablet by mouth daily. 7/7/22  Yes Charly Freedman MD   celecoxib (CELEBREX) 200 mg capsule Take 1 Capsule by mouth daily.  3/16/22  Yes Charly Freedman MD diclofenac (Voltaren) 1 % gel Apply 4 g to affected area four (4) times daily. 2/4/22  Yes Mey Way MD   atorvastatin (LIPITOR) 40 mg tablet Take 1 Tablet by mouth daily. 2/4/22  Yes Mey Way MD   fluticasone propionate (FLONASE) 50 mcg/actuation nasal spray Use 2 spray(s) in each nostril once daily 12/27/21  Yes Mey Way MD   miscellaneous medical supply misc DISPENSE (1) BEDSIDE COMMODE. DX:M15.9,Z74.09,Z96.641,M62.81,Z91.81 4/5/17  Yes Mey Way MD   VITAMIN D3 1,000 unit tablet  8/2/16  Yes Provider, Historical   cyanocobalamin (VITAMIN B12) 500 mcg tablet Take 500 mcg by mouth daily. Yes Provider, Historical     Patient Active Problem List   Diagnosis Code    High vitamin D level E67.3    Dyslipidemia E78.5    Varicose veins I83.90    Osteoarthritis M19.90    Osteoporosis M81.0    Status post right hip replacement Z96.641    Impaired mobility and ADLs Z74.09, Z78.9    Peripheral neuropathy G62.9    Postoperative anemia due to acute blood loss D62    Osteoarthritis of right hip M16.11    Decreased calculated GFR R94.4    History of kidney stones Z87.442    Numbness in both hands R20.0    Small bowel obstruction (HCC) K56.609    Hypercholesterolemia E78.00    Fx intertrochanteric hip (HCC) S72.143A    Muscle weakness (generalized) M62.81    Difficulty in walking, not elsewhere classified R26.2    ACP (advance care planning) Z71.89    Severe obesity (BMI 35.0-39. 9) E66.01    Age-related cataract of both eyes H25.9     Current Outpatient Medications   Medication Sig Dispense Refill    furosemide (LASIX) 20 mg tablet TAKE ONE TABLET BY MOUTH DAILY as needed FOR EDEMA 30 Tablet 12    gabapentin (NEURONTIN) 300 mg capsule Take 1 Capsule by mouth two (2) times daily as needed for Pain. Max Daily Amount: 600 mg. 60 Capsule 3    loratadine (CLARITIN) 10 mg tablet Take 1 Tablet by mouth daily. 30 Tablet 5    celecoxib (CELEBREX) 200 mg capsule Take 1 Capsule by mouth daily.  60 Capsule 12    diclofenac (Voltaren) 1 % gel Apply 4 g to affected area four (4) times daily. 5 Each 5    atorvastatin (LIPITOR) 40 mg tablet Take 1 Tablet by mouth daily. 30 Tablet 12    fluticasone propionate (FLONASE) 50 mcg/actuation nasal spray Use 2 spray(s) in each nostril once daily 16 g 12    miscellaneous medical supply misc DISPENSE (1) BEDSIDE COMMODE. DX:M15.9,Z74.09,Z96.641,M62.81,Z91.81 1 Each 0    VITAMIN D3 1,000 unit tablet       cyanocobalamin (VITAMIN B12) 500 mcg tablet Take 500 mcg by mouth daily. No Known Allergies  Past Medical History:   Diagnosis Date    Decreased calculated GFR 12/5/2014    High vitamin D level 12/6/2014    Vitamin D 25-Hydroxy (12/06/2014) = 138.9     History of echocardiogram 08/29/2014    EF 60%. No WMA. Mild conc LVH. Gr 1 DDfx. Mild RVE. Mild CATRACHO. Sm right & sm left pleural effusion. Hypercholesterolemia     Lower extremity venous duplex 10/02/2014    No DVT bilaterally.     Numbness in both hands     Osteoarthritis     Osteoarthritis of right hip     Osteoporosis     Peripheral neuropathy     Peripheral vascular disease (HCC)     Postoperative anemia due to acute blood loss     Vaginal fibroids     resolved s/p hyst    Varicose veins      Past Surgical History:   Procedure Laterality Date    HX CHOLECYSTECTOMY      HX HERNIA REPAIR  2010    abdominal, had 2nd surgery for infection    HX HIP FRACTURE TX Left     HX HIP REPLACEMENT Right 12/02/2014    S/P Right total hip replacement (12/02/2014 - Dr. Bimal Somers)    [address] [place] [address],[address]    HX OTHER SURGICAL  03/07/2018    Left foot toe surgery-DANA VARNER @ 1 foot 2 foot    MD TOTAL KNEE ARTHROPLASTY  2006    left    MD TOTAL KNEE ARTHROPLASTY  2001    right    VASCULAR SURGERY PROCEDURE UNLIST      Bilateral leg vein stripping     Family History   Problem Relation Age of Onset    Arthritis-rheumatoid Mother     Heart Attack Mother     Arthritis-rheumatoid Father     Other Father         gangrene OSTEOARTHRITIS Sister     Other Brother         pna    OSTEOARTHRITIS Brother     Cancer Daughter         in remission    OSTEOARTHRITIS Brother     Diabetes Brother     OSTEOARTHRITIS Sister      Social History     Tobacco Use    Smoking status: Former     Years: 1.00     Types: Cigarettes     Quit date: 1982     Years since quittin.6    Smokeless tobacco: Never   Substance Use Topics    Alcohol use: No     Comment: Quit alcohol in        Review of Systems   Constitutional: Negative. HENT:  Positive for congestion. Respiratory:  Positive for cough. Cardiovascular: Negative. All other systems reviewed and are negative. Objective:   No flowsheet data found. General: alert, cooperative, no distress   Mental  status: normal mood, behavior, speech, dress, motor activity, and thought processes, able to follow commands   HENT: NCAT   Neck: no visualized mass   Resp: no respiratory distress   Neuro: no gross deficits   Skin: no discoloration or lesions of concern on visible areas   Psychiatric: normal affect, consistent with stated mood, no evidence of hallucinations     Additional exam findings: none      We discussed the expected course, resolution and complications of the diagnosis(es) in detail. Medication risks, benefits, costs, interactions, and alternatives were discussed as indicated. I advised her to contact the office if her condition worsens, changes or fails to improve as anticipated. She expressed understanding with the diagnosis(es) and plan. Jorgito Messing, was evaluated through a synchronous (real-time) audio-video encounter. The patient (or guardian if applicable) is aware that this is a billable service, which includes applicable co-pays. This Virtual Visit was conducted with patient's (and/or legal guardian's) consent.  The visit was conducted pursuant to the emergency declaration under the [facility], [facility] waiver authority and the Coronavirus Preparedness and Response Supplemental Appropriations Act. Patient identification was verified, and a caregiver was present when appropriate. The patient was located at: Home: [address]  The provider was located at:  Facility (Appt Department): [address] [place] [address]        Rad Elaine MD

## 2022-09-23 ENCOUNTER — TELEPHONE (OUTPATIENT)
Dept: FAMILY MEDICINE CLINIC | Age: 87
End: 2022-09-23

## 2022-09-23 NOTE — LETTER
9/28/2022 3:40 PM    Ms. René Delcid  [address]      To Whom It May Concern: René Delcid is a patient in my care at [facility]. She has impaired mobility, impaired endurance, and restricted activity along with functional urinary incontinence. She would benefit from adult disposable undergarments to maintain hygiene. She needs 7 undergarments per day. Please dispense 210 disposable undergarments.       Sincerely,      Pham Benson MD

## 2022-09-23 NOTE — LETTER
9/27/2022 3:39 PM    Re: Ms. Koki Ramires  [address] [address]    To Whom It May Concern: Koki Ramires is a patient in my care at [facility]. She has impaired mobility, impaired endurance, and restricted activity along with functional urinary incontinence. She would benefit from adult disposable undergarments to maintain hygiene. She has previously been dispensed 6 per 30 days.         Sincerely,      Lionel Colmenares MD

## 2022-09-23 NOTE — TELEPHONE ENCOUNTER
Patient needs letter of medical necessity for adult pull ups that states the reason why she needs this ,  and that she needs 196 pull ups every 30 days with your name and signature .

## 2022-09-27 ENCOUNTER — HOSPITAL ENCOUNTER (OUTPATIENT)
Dept: LAB | Age: 87
Discharge: HOME | End: 2022-09-27

## 2022-09-27 LAB — XX-LABCORP SPECIMEN COL,LCBCF: NORMAL

## 2022-09-27 PROCEDURE — 99001 SPECIMEN HANDLING PT-LAB: CPT

## 2022-09-28 LAB
ALBUMIN SERPL-MCNC: 4.2 G/DL (ref 3.6–4.6)
ALBUMIN/GLOB SERPL: 1.6 {RATIO} (ref 1.2–2.2)
ALP SERPL-CCNC: 119 IU/L (ref 44–121)
ALT SERPL-CCNC: 14 IU/L (ref 0–32)
AST SERPL-CCNC: 17 IU/L (ref 0–40)
BILIRUB SERPL-MCNC: 0.4 MG/DL (ref 0–1.2)
BUN SERPL-MCNC: 10 MG/DL (ref 8–27)
BUN/CREAT SERPL: 12 (ref 12–28)
CALCIUM SERPL-MCNC: 9.4 MG/DL (ref 8.7–10.3)
CHLORIDE SERPL-SCNC: 106 MMOL/L (ref 96–106)
CHOLEST SERPL-MCNC: 226 MG/DL (ref 100–199)
CO2 SERPL-SCNC: 25 MMOL/L (ref 20–29)
CREAT SERPL-MCNC: 0.81 MG/DL (ref 0.57–1)
EGFR: 71 ML/MIN/1.73
GLOBULIN SER CALC-MCNC: 2.7 G/DL (ref 1.5–4.5)
GLUCOSE SERPL-MCNC: 86 MG/DL (ref 70–99)
HDLC SERPL-MCNC: 62 MG/DL
IMP & REVIEW OF LAB RESULTS: NORMAL
LDLC SERPL CALC-MCNC: 149 MG/DL (ref 0–99)
POTASSIUM SERPL-SCNC: 4.8 MMOL/L (ref 3.5–5.2)
PROT SERPL-MCNC: 6.9 G/DL (ref 6–8.5)
SODIUM SERPL-SCNC: 144 MMOL/L (ref 134–144)
TRIGL SERPL-MCNC: 84 MG/DL (ref 0–149)
VLDLC SERPL CALC-MCNC: 15 MG/DL (ref 5–40)

## 2022-10-10 ENCOUNTER — TELEPHONE (OUTPATIENT)
Dept: FAMILY MEDICINE CLINIC | Age: 87
End: 2022-10-10

## 2022-10-10 NOTE — TELEPHONE ENCOUNTER
----- Message from Senait Burns sent at 10/10/2022 10:44 AM EDT -----  Subject: Message to Provider    QUESTIONS  Information for Provider? Pt wants to know when Dr Akil Bailey would like   to see her again. If Pt cant be reached call her son, Arthur Rudolph at   [phone] to let him know when she can get an thanh. Pt states last time   she was in she wasn't seen because she has had a runny nose.  ---------------------------------------------------------------------------  --------------  Therese Ramos INFO  [phone]; Do not leave any message, patient will call back for answer  ---------------------------------------------------------------------------  --------------  SCRIPT ANSWERS  Relationship to Patient?  Self

## 2022-11-22 ENCOUNTER — OFFICE VISIT (OUTPATIENT)
Dept: FAMILY MEDICINE CLINIC | Age: 87
End: 2022-11-22
Payer: MEDICARE

## 2022-11-22 VITALS
WEIGHT: 224.6 LBS | RESPIRATION RATE: 16 BRPM | HEART RATE: 74 BPM | DIASTOLIC BLOOD PRESSURE: 77 MMHG | SYSTOLIC BLOOD PRESSURE: 125 MMHG | BODY MASS INDEX: 38.55 KG/M2 | OXYGEN SATURATION: 96 %

## 2022-11-22 DIAGNOSIS — E78.00 HYPERCHOLESTEROLEMIA: ICD-10-CM
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
## 2022-11-22 DIAGNOSIS — M25.551 BILATERAL HIP PAIN: ICD-10-CM

## 2022-11-22 DIAGNOSIS — M25.552 BILATERAL HIP PAIN: ICD-10-CM

## 2022-11-22 DIAGNOSIS — M25.50 ARTHRALGIA, UNSPECIFIED JOINT: Primary | ICD-10-CM

## 2022-11-22 DIAGNOSIS — G62.9 NEUROPATHY: ICD-10-CM

## 2022-11-22 DIAGNOSIS — R60.0 BILATERAL LEG EDEMA: ICD-10-CM

## 2022-11-22 PROCEDURE — 1123F ACP DISCUSS/DSCN MKR DOCD: CPT | Performed by: FAMILY MEDICINE

## 2022-11-22 PROCEDURE — G8427 DOCREV CUR MEDS BY ELIG CLIN: HCPCS | Performed by: FAMILY MEDICINE

## 2022-11-22 PROCEDURE — G8536 NO DOC ELDER MAL SCRN: HCPCS | Performed by: FAMILY MEDICINE

## 2022-11-22 PROCEDURE — G8432 DEP SCR NOT DOC, RNG: HCPCS | Performed by: FAMILY MEDICINE

## 2022-11-22 PROCEDURE — 1090F PRES/ABSN URINE INCON ASSESS: CPT | Performed by: FAMILY MEDICINE

## 2022-11-22 PROCEDURE — 99214 OFFICE O/P EST MOD 30 MIN: CPT | Performed by: FAMILY MEDICINE

## 2022-11-22 PROCEDURE — 1101F PT FALLS ASSESS-DOCD LE1/YR: CPT | Performed by: FAMILY MEDICINE

## 2022-11-22 PROCEDURE — G8417 CALC BMI ABV UP PARAM F/U: HCPCS | Performed by: FAMILY MEDICINE

## 2022-11-22 RX ORDER — LIDOCAINE 50 MG/G
PATCH TOPICAL
Qty: 30 EACH | Refills: 12 | Status: SHIPPED | OUTPATIENT
Start: 2022-11-22

## 2022-11-22 NOTE — PROGRESS NOTES
Chief Complaint   Patient presents with    Peripheral Neuropathy      Patient is having worsening neuropathy in feet . Patient has a lower right leg would that is taking longer to heal than she would like  . Labs     9/27          HPI    Norma Ramsay is a 82 y.o. female presenting today for 3 months  follow up of hld, peripheral neuropathy. Patient had labs on 9/27/22. Labs reviewed in detail with patient       Patient does not need medication refills today. New concerns today: pt c/o increased pain in her feet. She had swelling in both legs, R>L. Pt has   Been taking her lasix daily recently and that has been very helpful. The pain has improved. Pt has been having some falls. They are concerned about her hips that have been replaced in the past.   Review of Systems   Constitutional: Negative. HENT: Negative. Respiratory: Negative. Cardiovascular: Negative. Musculoskeletal:  Positive for falls. All other systems reviewed and are negative. Physical Exam  Vitals and nursing note reviewed. Constitutional:       Appearance: Normal appearance. She is not ill-appearing. HENT:      Head: Normocephalic and atraumatic. Right Ear: External ear normal.      Left Ear: External ear normal.      Nose: Nose normal.      Mouth/Throat:      Mouth: Mucous membranes are moist.   Eyes:      Extraocular Movements: Extraocular movements intact. Conjunctiva/sclera: Conjunctivae normal.   Cardiovascular:      Rate and Rhythm: Normal rate and regular rhythm. Heart sounds: No murmur heard. No friction rub. No gallop. Pulmonary:      Effort: Pulmonary effort is normal.      Breath sounds: Normal breath sounds. No wheezing, rhonchi or rales. Musculoskeletal:         General: Normal range of motion. Cervical back: Normal range of motion. Skin:     General: Skin is warm and dry. Neurological:      Mental Status: She is alert and oriented to person, place, and time. Coordination: Coordination normal.   Psychiatric:         Mood and Affect: Mood normal.         Behavior: Behavior normal.         Thought Content: Thought content normal.         Judgment: Judgment normal.       Diagnoses and all orders for this visit:    1. Arthralgia, unspecified joint  -     lidocaine (LIDODERM) 5 %; Apply patch to the affected area for 12 hours a day and remove for 12 hours a day. 2. Hypercholesterolemia  Stable, cont pres tx plan. 3. Neuropathy  Stable, cont pres tx plan. 4. Bilateral leg edema  Improved. Cont lasix every day prn    5. Fall, initial encounter  6. Bilateral hip pain  Recommend f/up appt with ortho. Suspect pt would benefit from PT to work on gait and strength.      Follow-up and Dispositions    Return in about 3 months (around 2/22/2023) for high cholesterol, specialist demarcus.

## 2022-11-22 NOTE — PROGRESS NOTES
Esa Mancuso presents today for   Chief Complaint   Patient presents with    Diabetes    Peripheral Neuropathy    Labs     9/27        There were no vitals filed for this visit. Is someone accompanying this pt? No     Is the patient using any DME equipment during OV? No     Depression Screening:  3 most recent PHQ Screens 8/13/2022   Little interest or pleasure in doing things Not at all   Feeling down, depressed, irritable, or hopeless Not at all   Total Score PHQ 2 0       Learning Assessment:  Learning Assessment 1/27/2020   PRIMARY LEARNER Patient   HIGHEST LEVEL OF EDUCATION - PRIMARY LEARNER  DID NOT GRADUATE [facility] PRIMARY LEARNER NONE   CO-LEARNER CAREGIVER No   PRIMARY LANGUAGE ENGLISH    NEED -   LEARNER PREFERENCE PRIMARY LISTENING   ANSWERED BY self   RELATIONSHIP SELF       Abuse Screening:  Abuse Screening Questionnaire 8/5/2021   Do you ever feel afraid of your partner? N   Are you in a relationship with someone who physically or mentally threatens you? N   Is it safe for you to go home? Y       Fall Screening  Fall Risk Assessment, last 12 mths 7/7/2022   Able to walk? Yes   Fall in past 12 months? 1   Do you feel unsteady? 1   Are you worried about falling 1   Is the gait abnormal? 1   Number of falls in past 12 months 2   Fall with injury? 1       Generalized Anxiety  No flowsheet data found. Health Maintenance Due   Topic Date Due    Shingrix Vaccine Age 49> (1 of 2) Never done    COVID-19 Vaccine (4 - Booster for Moderna series) 02/03/2022    Flu Vaccine (1) Never done   . Health Maintenance reviewed and discussed and ordered per Provider. Vaccines Due   Screenings Due       Esa Mancuso is updated on all     1. \"Have you been to the ER, urgent care clinic since your last visit? Hospitalized since your last visit? \" No    2. \"Have you seen or consulted any other health care providers outside of the [address] [place] [address] since your last visit? \" No 3. For patients aged 39-70: Has the patient had a colonoscopy / FIT/ Cologuard? NA - based on age     If the patient is female:    4. For patients aged 41-77: Has the patient had a mammogram within the past 2 years? NA - based on age    11. For patients aged 21-65: Has the patient had a pap smear?  NA - based on age

## 2022-11-30 ENCOUNTER — OFFICE VISIT (OUTPATIENT)
Dept: ORTHOPEDIC SURGERY | Age: 87
End: 2022-11-30
Payer: MEDICARE

## 2022-11-30 VITALS — BODY MASS INDEX: 38.55 KG/M2 | TEMPERATURE: 97.8 F | RESPIRATION RATE: 15 BRPM | HEIGHT: 64 IN

## 2022-11-30 DIAGNOSIS — S32.592A CLOSED FRACTURE OF RAMUS OF LEFT PUBIS, INITIAL ENCOUNTER (HCC): Primary | ICD-10-CM

## 2022-11-30 DIAGNOSIS — Z96.641 HISTORY OF TOTAL HIP REPLACEMENT, RIGHT: ICD-10-CM

## 2022-11-30 DIAGNOSIS — M25.552 BILATERAL HIP PAIN: ICD-10-CM

## 2022-11-30 DIAGNOSIS — M25.551 BILATERAL HIP PAIN: ICD-10-CM

## 2022-11-30 NOTE — PROGRESS NOTES
Erich Benavides  1935   Chief Complaint   Patient presents with    Hip Pain     rakesh        HISTORY OF PRESENT ILLNESS  Erich Benavides is a 82 y.o. female who presents today for evaluation of b/l hip. L>R. Patient rates pain as 10/10 today. Pain has been present since a fall suffered in August. Felt a pop in the left hip. Of note, she is s/p trochanteric nail left hip on 7/2/16. She has hx of right total hip replacement by Dr. Chet Tamayo. She presents today in a wheelchair. Her left hip pain is worse today. She has pain with standing, no pain while at rest. She has had increasing left hip pain in the last 3 weeks. Patient denies any fever, chills, chest pain, shortness of breath or calf pain. The remainder of the review of systems is negative. There are no new illness or injuries to report since last seen in the office. There are no changes to medications, allergies, family or social history. PHYSICAL EXAM:   Visit Vitals  Temp 97.8 °F (36.6 °C) (Temporal)   Resp 15   Ht 5' 4\" (1.626 m)   LMP 12/31/1985   BMI 38.55 kg/m²     The patient is a well-developed, well-nourished female   in no acute distress. The patient is alert and oriented times three. The patient is alert and oriented times three. Mood and affect are normal.  LYMPHATIC: lymph nodes are not enlarged and are within normal limits  SKIN: normal in color and non tender to palpation. There are no bruises or abrasions noted. NEUROLOGICAL: Motor sensory exam is within normal limits. Reflexes are equal bilaterally. There is normal sensation to pinprick and light touch  MUSCULOSKELETAL:  Limited range of motion nontender diffusely. No point tenderness laterally on the left hip    IMAGING: XR of the bilateral hip with 2 views obtained in the office dated 11/30/2022 was reviewed and read by Dr. Sunita Montoya: Left-sided Intertrochanteric nail in good position. No acute changes. Questionable fracture line at the pubis ramus.       IMPRESSION:      ICD-10-CM ICD-9-CM    1. Closed fracture of ramus of left pubis, initial encounter ([facility])  S32.592A 808.2 CT PELV WO CONT      REFERRAL TO ORTHOPEDICS      2. Bilateral hip pain  M25.551 719.45 AMB POC X-RAY RADEX HIP UNI WITH PELVIS 2-3 VIEWS    M25.552        3. History of total hip replacement, right  Z96.641 V43.64 REFERRAL TO ORTHOPEDICS           PLAN:   1. Pt presents today with b/l hip pain, L>R, with questionable fracture line at the pubis ramus seen on XR. Will be ordering STAT CT of the pelvis and referring to Dr. Nathan Llamas for further evaluation. Risk factors include: BMI>35  2. No ultrasound exam indicated today  3. No cortisone injection indicated today   4. No Physical/Occupational Therapy indicated today  5. Yes diagnostic test indicated today: CT PELVIS  6. No durable medical equipment indicated today  7. Yes referral indicated today DR MAZARIEGOS  8. No medications indicated today:   9. No Narcotic indicated today       RTC prn      Scribed by [address]) as dictated by Maverick Monroe MD    I, Dr. Maverick Monroe, confirm that all documentation is accurate.     Maverick Monroe M.D.   [phone]

## 2022-12-01 ENCOUNTER — HOSPITAL ENCOUNTER (OUTPATIENT)
Dept: CT IMAGING | Age: 87
End: 2022-12-01
Attending: ORTHOPAEDIC SURGERY
Payer: MEDICARE

## 2022-12-01 DIAGNOSIS — S32.592A CLOSED FRACTURE OF RAMUS OF LEFT PUBIS, INITIAL ENCOUNTER (HCC): ICD-10-CM

## 2022-12-01 PROCEDURE — 72192 CT PELVIS W/O DYE: CPT

## 2023-01-05 DIAGNOSIS — G62.9 NEUROPATHY: ICD-10-CM

## 2023-01-05 RX ORDER — GABAPENTIN 300 MG/1
300 CAPSULE ORAL
Qty: 60 CAPSULE | Refills: 3 | Status: SHIPPED | OUTPATIENT
Start: 2023-01-05

## 2023-01-05 NOTE — TELEPHONE ENCOUNTER
Patient need a medication refill. Please advise     Requested Prescriptions     Pending Prescriptions Disp Refills    gabapentin (NEURONTIN) 300 mg capsule 60 Capsule 3     Sig: Take 1 Capsule by mouth two (2) times daily as needed for Pain. Max Daily Amount: 600 mg.

## 2023-01-06 ENCOUNTER — OFFICE VISIT (OUTPATIENT)
Dept: ORTHOPEDIC SURGERY | Age: 88
End: 2023-01-06
Payer: MEDICAID

## 2023-01-06 VITALS — TEMPERATURE: 97.8 F | HEIGHT: 64 IN | BODY MASS INDEX: 38.55 KG/M2 | RESPIRATION RATE: 16 BRPM

## 2023-01-06 DIAGNOSIS — Z96.641 HISTORY OF TOTAL HIP REPLACEMENT, RIGHT: Primary | ICD-10-CM

## 2023-01-06 DIAGNOSIS — M25.551 CHRONIC RIGHT HIP PAIN: ICD-10-CM

## 2023-01-06 DIAGNOSIS — G89.29 CHRONIC RIGHT HIP PAIN: ICD-10-CM

## 2023-01-06 DIAGNOSIS — S76.011S STRAIN OF FLEXOR MUSCLE OF RIGHT HIP, SEQUELA: ICD-10-CM

## 2023-01-06 NOTE — PROGRESS NOTES
Patient: Ami Tyson                MRN: [id number]       SSN: [id number]  YOB: 1935        AGE: 82 y.o. SEX: female  Body mass index is 38.55 kg/m². PCP: Moriah Cabello MD  01/06/23      Ami Tyson presents today for follow up of her right hip pain. She has a previous total hip replacement. I personally reviewed the AP pelvis, obtained today, 01/06/23. Right trochanteric fx, torn hip flexor and sartorious. Hardware well-fixed, well-aligned. Left fx. Well healed. Hip replacement well-fixed, well-aligned. I personally reviewed the previous 1-view x-rays of the pelvis, obtained 11/30/22. Right THR, good position and alignment, significant osteoarthritis; left side TFN, healed; lumbar spine severe arthritis     I personally reviewed the previous CT pelvis, performed 12/1/22. No fx. identified, osteoporosis. Today she appears slightly frail she is in no acute distress she is got some weakness on the right side including hip flexor and abductor's although they do fire takes her secondary to after she tries to do it for the muscles to go but she can maintain a straight leg raise the hips rotate well the pelvis is stable calf is nontender there is evidence of venous stasis and she has significant neuropathy distally although both feet warm and well-perfused I cannot feel the dorsalis pedis pulse.     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 and takes her secondary to recruit those muscles which would help explain for the the the multiple falls I like to arrange some home health physical therapy for her and and I think she did have a small crack in the greater trochanter on the right side which appears to be healing uneventfully at this point in time there is some calcification at the ASIS as well and including the tensor hip flexors also mildly calcified I suspect she is had partial tears of these areas as well the decision was to we will treat her nonoperatively and the family is in agreement that was her decision together plans for follow-up 3 months time AP pelvis when she returns thank you    Today Ms. Shayne Ye is accompanied by her son. We discussed the x-rays we obtained today and that I do not recommend therapy at this time. I am prescribing home health therapy to help strengthen the hip muscles. She has had multiple falls and I encouraged her to be extremely cautious as she has had previous falls and due to previous tears in the hip her leg is now weaker. She is using her rollator walker at today's visit. PLAN:   - Discussion regarding surgery, decided that it is not recommended at this time. - Home health therapy ordered. - Return in 3 months     REVIEW OF SYSTEMS:      CON: negative  EYE: negative   ENT: negative  RESP: negative  GI:    negative   :  negative  MSK: Positive  A twelve point review of systems was completed, positives noted and all other systems were reviewed and are negative          Past Medical History:   Diagnosis Date    Decreased calculated GFR 12/5/2014    High vitamin D level 12/6/2014    Vitamin D 25-Hydroxy (12/06/2014) = 138.9     History of echocardiogram 08/29/2014    EF 60%. No WMA. Mild conc LVH. Gr 1 DDfx. Mild RVE. Mild CATRACHO. Sm right & sm left pleural effusion. Hypercholesterolemia     Lower extremity venous duplex 10/02/2014    No DVT bilaterally.     Numbness in both hands     Osteoarthritis     Osteoarthritis of right hip     Osteoporosis     Peripheral neuropathy     Peripheral vascular disease (HCC)     Postoperative anemia due to acute blood loss     Vaginal fibroids     resolved s/p hyst    Varicose veins        Family History   Problem Relation Age of Onset    Arthritis-rheumatoid Mother     Heart Attack Mother     Arthritis-rheumatoid Father     Other Father         gangrene    OSTEOARTHRITIS Sister     Other Brother         pna OSTEOARTHRITIS Brother     Cancer Daughter         in remission    OSTEOARTHRITIS Brother     Diabetes Brother     OSTEOARTHRITIS Sister        Current Outpatient Medications   Medication Sig Dispense Refill    gabapentin (NEURONTIN) 300 mg capsule Take 1 Capsule by mouth two (2) times daily as needed for Pain. Max Daily Amount: 600 mg. 60 Capsule 3    lidocaine (LIDODERM) 5 % Apply patch to the affected area for 12 hours a day and remove for 12 hours a day. 30 Each 12    furosemide (LASIX) 20 mg tablet TAKE ONE TABLET BY MOUTH DAILY as needed FOR EDEMA 30 Tablet 12    loratadine (CLARITIN) 10 mg tablet Take 1 Tablet by mouth daily. 30 Tablet 5    celecoxib (CELEBREX) 200 mg capsule Take 1 Capsule by mouth daily. 60 Capsule 12    diclofenac (Voltaren) 1 % gel Apply 4 g to affected area four (4) times daily. 5 Each 5    atorvastatin (LIPITOR) 40 mg tablet Take 1 Tablet by mouth daily. 30 Tablet 12    fluticasone propionate (FLONASE) 50 mcg/actuation nasal spray Use 2 spray(s) in each nostril once daily 16 g 12    miscellaneous medical supply misc DISPENSE (1) BEDSIDE COMMODE. DX:M15.9,Z74.09,Z96.641,M62.81,Z91.81 1 Each 0    VITAMIN D3 1,000 unit tablet       cyanocobalamin (VITAMIN B12) 500 mcg tablet Take 500 mcg by mouth daily.            No Known Allergies    Past Surgical History:   Procedure Laterality Date    HX CHOLECYSTECTOMY      HX HERNIA REPAIR  2010    abdominal, had 2nd surgery for infection    HX HIP FRACTURE TX Left     HX HIP REPLACEMENT Right 12/02/2014    S/P Right total hip replacement (12/02/2014 - Dr. Radha Dillard)    [address] [place] [address],[address]    HX OTHER SURGICAL  03/07/2018    Left foot toe surgery-DANA VARNER @ 1 foot 2 foot    [place] ARTHRP KNE CONDYLE&PLATU MEDIAL&LAT COMPARTMENTS  2006    left    [place] ARTHRP KNE CONDYLE&PLATU MEDIAL&LAT COMPARTMENTS  2001    right    [place] UNLISTED PROCEDURE VASCULAR SURGERY      Bilateral leg vein stripping       Social History     Socioeconomic History    Marital status:      Spouse name: Not on file    Number of children: Not on file    Years of education: Not on file    Highest education level: Not on file   Occupational History    Not on file   Tobacco Use    Smoking status: Former     Years: 1.00     Types: Cigarettes     Quit date: 1982     Years since quittin.0    Smokeless tobacco: Never   Substance and Sexual Activity    Alcohol use: No     Comment: Quit alcohol in     Drug use: Yes     Types: Prescription, OTC     Comment: prescribed    Sexual activity: Never   Other Topics Concern    Not on file   Social History Narrative    Not on file     Social Determinants of Health     Financial Resource Strain: Not on file   Food Insecurity: Not on file   Transportation Needs: Not on file   Physical Activity: Not on file   Stress: Not on file   Social Connections: Not on file   Intimate Partner Violence: Not on file   Housing Stability: Not on file       Visit Vitals  Temp 97.8 °F (36.6 °C) (Temporal)   Resp 16   Ht 5' 4\" (1.626 m)   LMP 1985   BMI 38.55 kg/m²         PHYSICAL EXAMINATION:  GENERAL: Alert and oriented x3, in no acute distress, well-developed, well-nourished, afebrile. HEART: No JVD. EYES: No scleral icterus   NECK: No significant lymphadenopathy   LUNGS: No respiratory compromise or indrawing  ABDOMEN: Soft, non-tender, non-distended. Note: This note was completed using voice recognition software.  Any typographical/name errors or mistakes are unintentional.    Written by: Joe Man, as dictated by Agustin Duane, MD.    Electronically signed by: Saurabh Julio

## 2023-01-14 ENCOUNTER — HOME HEALTH ADMISSION (OUTPATIENT)
Dept: HOME HEALTH SERVICES | Facility: HOME HEALTH | Age: 88
End: 2023-01-14
Payer: MEDICAID

## 2023-01-17 ENCOUNTER — HOME CARE VISIT (OUTPATIENT)
Dept: HOME HEALTH SERVICES | Facility: HOME HEALTH | Age: 88
End: 2023-01-17

## 2023-01-17 ENCOUNTER — HOME CARE VISIT (OUTPATIENT)
Dept: SCHEDULING | Facility: HOME HEALTH | Age: 88
End: 2023-01-17
Payer: MEDICAID

## 2023-01-17 VITALS
SYSTOLIC BLOOD PRESSURE: 132 MMHG | OXYGEN SATURATION: 100 % | TEMPERATURE: 98.2 F | HEART RATE: 60 BPM | DIASTOLIC BLOOD PRESSURE: 72 MMHG | RESPIRATION RATE: 16 BRPM

## 2023-01-17 PROCEDURE — G0151 HHCP-SERV OF PT,EA 15 MIN: HCPCS

## 2023-01-17 NOTE — Clinical Note
Dear Brian Rivas  ,     This message is to inform you that your patient, CELINE Person, 39,  has been admitted to  [facility] services under Home PT today. It was determined that pt will benefit from Home PT for 2w4 then reassess,   to improve strength and endurance, gait, balance, safety on transfers, and maximize overall function. Any questions please contact me at [phone].     Sincerely,     Amie Muse, PT

## 2023-01-17 NOTE — Clinical Note
Mame Zamorano will be seeing this pt for treatment. Admitted with chronic R hip pain      Please focus on:   1.supine,  seated HEP  2. Bed mob to make easier on her  3. safety with transfers off commode, recliner chair, rollator and bed  4. gait training in home with rollator (wants to walk better or with greater ease)   5.   maximize overall function    Any questions give me a call  Mindy Hannah

## 2023-01-18 NOTE — HOME HEALTH
Admission Summary: Ms. Flo Crabtree  is a 82year old female being referred to home health for PT services for chronic R hip pain by Dr. Emily Maki. The patient's caregiver/representative  not present, but  able and willing to provide care daily (son). Patient participated in goal setting and care planning and are agreeable to the care plan. Discharge planning/training was initiated to maximize potential and make walking easier for her. Admission booklet reviewed with patient; including review of rights and responsibilities, discharge/transfer policies, and contact information for , , Medicare, [facility], and outside resources for independence. PMHx:f total hip replacement, right   Chronic ri ght hip pain   Strain of flexor muscle of right hip, sequela   [place] TKR     SUBJECTIVE:  \"I just want to walk better. \"    Pt reports no R hip pain today but reports that she often has arthritic pain in her joints. No recent reported falls in last weeks. REQUIRES CAREGIVER ASSISTANCE FOR: son or PCA assists with meals, ADLs,  transportation, medications, ambulation toileting   PLOF: Pt lives with son in 1 level home with ramp to open. Pt has ramp to enter home. Son works during the days. Pt also has PCA M-F for 3-4 hours a day. Pt uses rollator to ambulate to ambulate around the home and states when ambulating to MD appt. Pt PCA and son assist with meals, ADLs, and medications. Son oversees medications  DME: commode, w/c, rollator, reacher,bath bench  MEDICATIONS REVIEWED AND UPDATED: Medications reconciled and all medications are available in the home this visit. The following education was provided regarding high risk medications, medication interactions, and look a like medications: Continue medication as prescribed by MD. Medications are effective at this time. NEXT MD APPT: TBD   ROM: WNL - R LE externally rotated  STRENGTH:  see strength section for details.    WOUNDS: none noted or reported  BED MOBILITY: sup <> sit with SBA and increased effort to get LEs into bed and using UE to assist self. TRANSFERS: sit to stand from bed, chair and commode with arm rests, with SBA but cues for proper hand placement for safety and proper technique and multiple attempts from recliner chair . Pt did need mod A to get off rollator that she was sitting on. GAIT: pt ambulated 25' x3 with rollator,  and close SBA with cues for upright posture and to be mindful of the clutter. Pt whips her R LE during swing phase and has some external rotation noted on RLE.   + SOB and ORTIZ with ambulation. STAIRS: NA - has ramp and reports son places her on rollator and rolls her down the ramp and up the ramp to get in and out of home  BALANCE:  [address] with rollator. Reports falls in last year. PATIENT EDUCATION PROVIDED THIS VISIT: safety for transfers and proper hand placement, walking with upright posture,  deep breathing/PLB, clearing objects and clear pathways for fall prevention as well as well lit areas at nighttime, signs and symptoms of infection. Patient Level of understanding of education provided: pt able to verbalize understanding of POT and upcoming PT. Patient response to treatment: Pt reported no increase in pain throughout treatment but fatigue to get through short but tight and cluttered spaces in home. CONTINUED NEED FOR THE FOLLOWING SKILLS: HH PT is medically necessary to  address R hip pain,  decreased strength and endurance, decreased independence with bed mob and  functional transfers, impaired gait,  and impaired balance in order to improve functional independence, quality of life, return to PLOF, and reduce the risk for falls.   PLAN: 2w4, then will reassess the need further further therapy or DC  DISCHARGE PLANNING DISCUSSED: Discharge to self and family under MD supervision once all goals have been met or patient has reached max potential.

## 2023-01-19 ENCOUNTER — HOME CARE VISIT (OUTPATIENT)
Dept: SCHEDULING | Facility: HOME HEALTH | Age: 88
End: 2023-01-19
Payer: MEDICAID

## 2023-01-19 ENCOUNTER — HOME CARE VISIT (OUTPATIENT)
Dept: HOME HEALTH SERVICES | Facility: HOME HEALTH | Age: 88
End: 2023-01-19
Payer: MEDICAID

## 2023-01-19 VITALS
OXYGEN SATURATION: 98 % | DIASTOLIC BLOOD PRESSURE: 74 MMHG | SYSTOLIC BLOOD PRESSURE: 131 MMHG | TEMPERATURE: 97.8 F | HEART RATE: 80 BPM

## 2023-01-19 PROCEDURE — G0157 HHC PT ASSISTANT EA 15: HCPCS

## 2023-01-20 DIAGNOSIS — M19.011 PRIMARY OSTEOARTHRITIS, RIGHT SHOULDER: ICD-10-CM

## 2023-01-20 RX ORDER — DICLOFENAC SODIUM 10 MG/G
4 GEL TOPICAL 4 TIMES DAILY
Qty: 5 EACH | Refills: 5 | Status: CANCELLED | OUTPATIENT
Start: 2023-01-20

## 2023-01-20 NOTE — TELEPHONE ENCOUNTER
Patient called and needs a medication refill. Please advise. Requested Prescriptions     Pending Prescriptions Disp Refills    diclofenac (Voltaren) 1 % gel 5 Each 5     Sig: Apply 4 g to affected area four (4) times daily.

## 2023-01-23 ENCOUNTER — HOME CARE VISIT (OUTPATIENT)
Dept: HOME HEALTH SERVICES | Facility: HOME HEALTH | Age: 88
End: 2023-01-23
Payer: MEDICAID

## 2023-01-23 DIAGNOSIS — M19.011 PRIMARY OSTEOARTHRITIS, RIGHT SHOULDER: ICD-10-CM

## 2023-01-23 NOTE — TELEPHONE ENCOUNTER
Pharmacy requesting a refill  Pt last seen 11/27/22  Next appt 03/22/23  Med last filled 02/04/22 quantity #5 with 5 refills  Please advise  Requested Prescriptions     Pending Prescriptions Disp Refills    diclofenac (Voltaren) 1 % gel 5 Each 5     Sig: Apply 4 g to affected area four (4) times daily.

## 2023-01-25 ENCOUNTER — HOME CARE VISIT (OUTPATIENT)
Dept: SCHEDULING | Facility: HOME HEALTH | Age: 88
End: 2023-01-25
Payer: MEDICAID

## 2023-01-25 VITALS
TEMPERATURE: 97.7 F | SYSTOLIC BLOOD PRESSURE: 135 MMHG | OXYGEN SATURATION: 96 % | DIASTOLIC BLOOD PRESSURE: 75 MMHG | HEART RATE: 80 BPM

## 2023-01-25 PROCEDURE — G0157 HHC PT ASSISTANT EA 15: HCPCS

## 2023-01-25 RX ORDER — DICLOFENAC SODIUM 10 MG/G
4 GEL TOPICAL 4 TIMES DAILY
Qty: 5 EACH | Refills: 5 | OUTPATIENT
Start: 2023-01-25

## 2023-01-25 NOTE — HOME HEALTH
SUBJECTIVE: Pt c/o numbness in bilateal LEs, no changes in medications, denies falls    CAREGIVER INVOLVEMENT/ASSISTANCE NEEDED FOR: Son assist as needed     OBJECTIVE: See interventions  PATIENT EDUCATION PROVIDED THIS VISIT: Pt educated fall prevention instructed to use FWW at ALL times for safety also discussed having family assist with clearning path and removing clutter to improve safety  PATIENT RESPONSE TO EDUCATION: Pt verbalizes understanding of education    PATIENT RESPONSE TO TREATMENT/ASSESSMENT OF PROGRESS TOWARD GOALS: Pt presents with decreaed strength, endurance and safety with functional mobility. LPTA Etablished HEP pt instructed to perform 3x day pt able to return demonstrate, Pt requires asssistive device for amb VCs to improve posture and foot clearance fair return demostration. . Pt instructed in sit to stand transfer VCs to imrpove body mechanics. Pt c/o fatigue 8/10 following tx session no pain reported. PLAN FOR NEXT VISIT: Next visit will address strength, balance and endurance  DISCHARGE PLANS: POC and Discharge plans discussed pt understands and agrees eventual DC once goals have been met or max HC benefits have been achieved.  Frequency 2wk4, 5 visists remaining anticipated DC 2/9/2023

## 2023-01-27 ENCOUNTER — HOME CARE VISIT (OUTPATIENT)
Dept: SCHEDULING | Facility: HOME HEALTH | Age: 88
End: 2023-01-27
Payer: MEDICAID

## 2023-01-27 DIAGNOSIS — J30.2 CHRONIC SEASONAL ALLERGIC RHINITIS: ICD-10-CM

## 2023-01-27 PROCEDURE — G0157 HHC PT ASSISTANT EA 15: HCPCS

## 2023-01-27 RX ORDER — FLUTICASONE PROPIONATE 50 MCG
SPRAY, SUSPENSION (ML) NASAL
Qty: 16 G | Refills: 12 | Status: SHIPPED | OUTPATIENT
Start: 2023-01-27

## 2023-01-27 RX ORDER — FLUTICASONE PROPIONATE 50 MCG
SPRAY, SUSPENSION (ML) NASAL
Qty: 16 G | Refills: 12 | OUTPATIENT
Start: 2023-01-27

## 2023-01-27 NOTE — TELEPHONE ENCOUNTER
Pt  is requesting a refill  Please refill  Requested Prescriptions     Pending Prescriptions Disp Refills    fluticasone propionate (FLONASE) 50 mcg/actuation nasal spray 16 g 12     Sig: Use 2 spray(s) in each nostril once daily

## 2023-01-29 VITALS
DIASTOLIC BLOOD PRESSURE: 88 MMHG | SYSTOLIC BLOOD PRESSURE: 137 MMHG | HEART RATE: 85 BPM | TEMPERATURE: 97.8 F | OXYGEN SATURATION: 97 %

## 2023-01-29 NOTE — HOME HEALTH
SUBJECTIVE: Pt c/o, denies falls or changes in medicaions  CAREGIVER INVOLVEMENT/ASSISTANCE NEEDED FOR: Son assist as needed     OBJECTIVE: See interventions  PATIENT EDUCATION PROVIDED THIS VISIT: Reviewed fall prevention   PATIENT RESPONSE TO EDUCATION:  Pt able to teach back fall prevention  PATIENT RESPONSE TO TREATMENT/ASSESSMENT OF PROGRESS TOWARD GOALS: Pt presents with decreaed strength, endurance and safety with functional mobility. Instructed in HEP to bilateal LEs pt able to tolerate increased reps without reports of pain, Performs bed mobility Ind. Sit to stand transfer tng w/ VCs for hand placement, instruction to improve immediate standing balance and posture. Gait requires instruction for upright posture, pt whips RLE to advance with minimal to no foot clearance bilaterally. Pt instructed in pursed lip breathing rest periods needed due to [address] [place]. No increase inpain reported. PLAN FOR NEXT VISIT: Next visit will address strength, transfer with proper hand placment, good immediate standing balance and upright posture   DISCHARGE PLANS: POC and Discharge plans discussed pt understands and agrees eventual DC once goals have been met or max HC benefits have been achieved.  Frequency 2wk4, 4 visists remaining anticipated DC 2/9/2023

## 2023-01-30 ENCOUNTER — HOME CARE VISIT (OUTPATIENT)
Dept: SCHEDULING | Facility: HOME HEALTH | Age: 88
End: 2023-01-30
Payer: MEDICAID

## 2023-01-30 PROCEDURE — G0157 HHC PT ASSISTANT EA 15: HCPCS

## 2023-01-31 VITALS
DIASTOLIC BLOOD PRESSURE: 87 MMHG | TEMPERATURE: 98 F | OXYGEN SATURATION: 97 % | HEART RATE: 78 BPM | SYSTOLIC BLOOD PRESSURE: 137 MMHG

## 2023-02-01 ENCOUNTER — HOME CARE VISIT (OUTPATIENT)
Dept: SCHEDULING | Facility: HOME HEALTH | Age: 88
End: 2023-02-01
Payer: MEDICAID

## 2023-02-01 PROCEDURE — G0157 HHC PT ASSISTANT EA 15: HCPCS

## 2023-02-01 NOTE — HOME HEALTH
SUBJECTIVE: Pt c/o, denies falls or changes in medicaions  CAREGIVER INVOLVEMENT/ASSISTANCE NEEDED FOR: Son assist as needed     OBJECTIVE: See interventions  PATIENT EDUCATION PROVIDED THIS VISIT: Reviewed fall prevention   PATIENT RESPONSE TO EDUCATION:  Pt able to teach back fall prevention  PATIENT RESPONSE TO TREATMENT/ASSESSMENT OF PROGRESS TOWARD GOALS: Pt encouraged to peform HEP 3x day, pt does not feel safe walking when home alone. Instructed to walk when son is home. Instructed in gait tng on even surfaces VCs to improve posture and wt shifting pt unable to return demo. decreased VCs for sit to stand transfers . static standing x 30sec x 2 VCs to improve posture. No c/o pain c/o 6/10 fatigue   PLAN FOR NEXT VISIT: Next visit will address strength and gait   DISCHARGE PLANS: POC and Discharge plans discussed pt understands and agrees eventual DC once goals have been met or max HC benefits have been achieved.  Frequency 2wk4, 3 visists remaining anticipated DC 2/9/2023

## 2023-02-02 VITALS
SYSTOLIC BLOOD PRESSURE: 138 MMHG | HEART RATE: 81 BPM | TEMPERATURE: 97.7 F | DIASTOLIC BLOOD PRESSURE: 88 MMHG | OXYGEN SATURATION: 97 %

## 2023-02-02 NOTE — HOME HEALTH
SUBJECTIVE: Pt c/o, denies falls or changes in medicaions, bilateral LEs pain  CAREGIVER INVOLVEMENT/ASSISTANCE NEEDED FOR: Pt is alone most of the day, son is available after he gets off from work  OBJECTIVE: See interventions  PATIENT EDUCATION PROVIDED THIS VISIT: Reviewed fall prevention   PATIENT RESPONSE TO EDUCATION:  Pt able to teach back fall prevention  PATIENT RESPONSE TO TREATMENT/ASSESSMENT OF PROGRESS TOWARD GOALS: Pt has not been compliant with HEP states she is afraid she will fall, although she has been instructed in supine strengthening. Recommended pt perform HEP and Ambulation when son is home. Gait tng on even surfaces w/ Rollator walker pt continue to amb with flexed posture minimal to no foot clearance, decreased stride length. Pt has made minimal progress toward establised goals. PLAN FOR NEXT VISIT: Next visit will address strength and gait   DISCHARGE PLANS: POC and Discharge plans discussed pt understands and agrees eventual DC once goals have been met or max HC benefits have been achieved.  Frequency 2wk4, 3 visists remaining anticipated DC 2/9/2023

## 2023-02-07 ENCOUNTER — HOME CARE VISIT (OUTPATIENT)
Dept: SCHEDULING | Facility: HOME HEALTH | Age: 88
End: 2023-02-07
Payer: MEDICAID

## 2023-02-07 VITALS
SYSTOLIC BLOOD PRESSURE: 130 MMHG | HEART RATE: 68 BPM | OXYGEN SATURATION: 98 % | DIASTOLIC BLOOD PRESSURE: 73 MMHG | TEMPERATURE: 97.7 F

## 2023-02-07 PROCEDURE — G0157 HHC PT ASSISTANT EA 15: HCPCS

## 2023-02-07 NOTE — HOME HEALTH
SUBJECTIVE: Pt c/o pain in bilateral hands denies falls or changes in medications  ASISTANCE NEEDED FOR: PCA assist pateint daily, son is available to assist in the evening  OBJECTIVE: See interventions  Assessment and Summary of Care: Ms Paige Camilo demonstrates sligt increase in LE strength improving ability to peform bed mobility, occasional VCs to improve body mechanics with sit to stand trasnfers, improved right hip pain. Pt has made minimal progress toward ambulation goals, questionable compliance with HEP. Pt continues to be at risk for falls due to deficits in strength and cluttered surroundings, this has been discussed with patient on several occasions with no change. Pt has met max rehab potential at this time and has been prepared for DC next visit.   STRENGTH: RLE 3-, LLE 3+  BED MOBILITY: Ind   TRANSFERS: Ind   GAIT: 60ft on even surfaces with rollator walker VCs to improve posutre  STAIRS/RAMP: Not tested Son assist pt in [facility]  SPECIAL TEST:  None  RECOMMENDATIONS: Perform HEP 3x day, walk when caregivers are present

## 2023-02-08 ENCOUNTER — HOME CARE VISIT (OUTPATIENT)
Dept: SCHEDULING | Facility: HOME HEALTH | Age: 88
End: 2023-02-08
Payer: MEDICAID

## 2023-02-08 VITALS
TEMPERATURE: 98.1 F | HEART RATE: 78 BPM | RESPIRATION RATE: 16 BRPM | OXYGEN SATURATION: 97 % | DIASTOLIC BLOOD PRESSURE: 66 MMHG | SYSTOLIC BLOOD PRESSURE: 110 MMHG

## 2023-02-08 PROCEDURE — G0151 HHCP-SERV OF PT,EA 15 MIN: HCPCS

## 2023-02-14 DIAGNOSIS — Z96.641 PRESENCE OF RIGHT ARTIFICIAL HIP JOINT: Primary | ICD-10-CM

## 2023-03-22 ENCOUNTER — OFFICE VISIT (OUTPATIENT)
Facility: CLINIC | Age: 88
End: 2023-03-22
Payer: MEDICAID

## 2023-03-22 VITALS
HEART RATE: 60 BPM | BODY MASS INDEX: 38.28 KG/M2 | OXYGEN SATURATION: 98 % | SYSTOLIC BLOOD PRESSURE: 102 MMHG | WEIGHT: 223 LBS | TEMPERATURE: 97 F | DIASTOLIC BLOOD PRESSURE: 59 MMHG

## 2023-03-22 DIAGNOSIS — E78.00 PURE HYPERCHOLESTEROLEMIA, UNSPECIFIED: ICD-10-CM

## 2023-03-22 DIAGNOSIS — M79.641 BILATERAL HAND PAIN: Primary | ICD-10-CM

## 2023-03-22 DIAGNOSIS — R60.0 LOCALIZED EDEMA: ICD-10-CM

## 2023-03-22 DIAGNOSIS — M79.642 BILATERAL HAND PAIN: Primary | ICD-10-CM

## 2023-03-22 PROCEDURE — 1123F ACP DISCUSS/DSCN MKR DOCD: CPT | Performed by: FAMILY MEDICINE

## 2023-03-22 PROCEDURE — 99214 OFFICE O/P EST MOD 30 MIN: CPT | Performed by: FAMILY MEDICINE

## 2023-03-22 RX ORDER — FUROSEMIDE 20 MG/1
TABLET ORAL
Qty: 30 TABLET | Refills: 12 | Status: SHIPPED | OUTPATIENT
Start: 2023-03-22

## 2023-03-22 RX ORDER — ATORVASTATIN CALCIUM 40 MG/1
40 TABLET, FILM COATED ORAL DAILY
Qty: 30 TABLET | Refills: 12 | Status: SHIPPED | OUTPATIENT
Start: 2023-03-22

## 2023-03-22 SDOH — ECONOMIC STABILITY: INCOME INSECURITY: HOW HARD IS IT FOR YOU TO PAY FOR THE VERY BASICS LIKE FOOD, HOUSING, MEDICAL CARE, AND HEATING?: SOMEWHAT HARD

## 2023-03-22 SDOH — ECONOMIC STABILITY: HOUSING INSECURITY
IN THE LAST 12 MONTHS, WAS THERE A TIME WHEN YOU DID NOT HAVE A STEADY PLACE TO SLEEP OR SLEPT IN A SHELTER (INCLUDING NOW)?: NO

## 2023-03-22 SDOH — ECONOMIC STABILITY: FOOD INSECURITY: WITHIN THE PAST 12 MONTHS, YOU WORRIED THAT YOUR FOOD WOULD RUN OUT BEFORE YOU GOT MONEY TO BUY MORE.: OFTEN TRUE

## 2023-03-22 SDOH — ECONOMIC STABILITY: FOOD INSECURITY: WITHIN THE PAST 12 MONTHS, THE FOOD YOU BOUGHT JUST DIDN'T LAST AND YOU DIDN'T HAVE MONEY TO GET MORE.: OFTEN TRUE

## 2023-03-22 ASSESSMENT — PATIENT HEALTH QUESTIONNAIRE - PHQ9
SUM OF ALL RESPONSES TO PHQ QUESTIONS 1-9: 0
SUM OF ALL RESPONSES TO PHQ QUESTIONS 1-9: 0
1. LITTLE INTEREST OR PLEASURE IN DOING THINGS: 0
2. FEELING DOWN, DEPRESSED OR HOPELESS: 0
SUM OF ALL RESPONSES TO PHQ9 QUESTIONS 1 & 2: 0
SUM OF ALL RESPONSES TO PHQ QUESTIONS 1-9: 0
SUM OF ALL RESPONSES TO PHQ QUESTIONS 1-9: 0

## 2023-03-22 NOTE — PROGRESS NOTES
ASSESSMENT/PLAN:  1. Bilateral hand pain  -     [facility] - Jennifer Esqueda DO, Hand Surgery, [facility] ([address])  Patient has history of severe OA. Unclear if her pain may be related to arthritis in her hands versus some degree of radiculopathy causing decreased ability to use the hands. We will start with evaluation by hand surgery. 2. Pure hypercholesterolemia, unspecified  -     Lipid Panel; Future  -     Comprehensive Metabolic Panel; Future  -     atorvastatin (LIPITOR) 40 MG tablet; Take 1 tablet by mouth daily, Disp-30 tablet, R-12Normal  3. Localized edema  -     furosemide (LASIX) 20 MG tablet; TAKE ONE TABLET BY MOUTH DAILY as needed FOR EDEMA, Disp-30 tablet, R-12Normal      Return in about 3 months (around 6/22/2023) for HLD, lab review. SUBJECTIVE/OBJECTIVE:    Chief Complaint   Patient presents with    Cholesterol Problem    Hand Pain     Bilateral but worse on rt hand         HPI    Samara Hernandez is a 82 y.o. female presenting today for    4 months  follow up of hld. Patient does need medication refills today. New concerns today: pt c/o b hand pain, R>L. She has trouble holding her cup or utensils at times to eat. Pt denies neck pain but         Review of Systems   Constitutional: Negative. HENT: Negative. Respiratory: Negative. Cardiovascular: Negative. Musculoskeletal:         Bilateral hand pain   All other systems reviewed and are negative. Physical Exam  Vitals and nursing note reviewed. Constitutional:       General: She is not in acute distress. Appearance: Normal appearance. HENT:      Head: Normocephalic and atraumatic. Right Ear: External ear normal.      Left Ear: External ear normal.      Nose: Nose normal.      Mouth/Throat:      Mouth: Mucous membranes are moist.   Eyes:      Extraocular Movements: Extraocular movements intact.       Conjunctiva/sclera: Conjunctivae normal.   Cardiovascular:      Rate and Rhythm: Normal rate and regular
Housing Stability: Unknown    Unable to Pay for Housing in the Last Year: Not on file    Number of [place] in the Last Year: Not on file    Unstable Housing in the Last Year: No        Health Maintenance Due   Topic Date Due    Shingles vaccine (1 of 2) Never done    DEXA (modify frequency per FRAX score)  Never done    Pneumococcal 65+ years Vaccine (1 - PCV) Never done    COVID-19 Vaccine (4 - Booster for Moderna series) 02/03/2022    Flu vaccine (1) Never done   . Coordination of Care:  1. Have you been to the ER, urgent care clinic since your last visit? Hospitalized since your last visit? no    2. Have you seen or consulted any other health care providers outside of the [address] [place] [address] since your last visit? Include any pap smears or colon screening.  no

## 2023-03-26 ASSESSMENT — ENCOUNTER SYMPTOMS: RESPIRATORY NEGATIVE: 1

## 2023-03-30 RX ORDER — CELECOXIB 200 MG/1
CAPSULE ORAL
Qty: 90 CAPSULE | Refills: 4 | Status: SHIPPED | OUTPATIENT
Start: 2023-03-30

## 2023-04-05 ENCOUNTER — TELEPHONE (OUTPATIENT)
Facility: CLINIC | Age: 88
End: 2023-04-05

## 2023-04-05 RX ORDER — DICLOFENAC SODIUM 75 MG/1
75 TABLET, DELAYED RELEASE ORAL 2 TIMES DAILY
Qty: 60 TABLET | Refills: 3 | Status: SHIPPED | OUTPATIENT
Start: 2023-04-05

## 2023-04-05 NOTE — TELEPHONE ENCOUNTER
Ms. Angela Noriega had a friend  to call on her behalf she was in the back ground and gave permission for us to speak. Pt says she has been unable to receive her celebrex script from pharmacy because it needs a prior authorization.   The pt states that she has now been out of this med for 2wks  Please advise

## 2023-04-05 NOTE — TELEPHONE ENCOUNTER
Will have staff work on [place]. For now, discussed trial of diclofenac with pt. Pt in agreement as she has been without med for over 2 wks. E-rx sent. Pt advised it will be a bid med. Pt understands that it is NOT to be taken with celebrex. When her PA is approved, she is to [facility] back to celebrex. Called and left message on pt's son's vm  to ensure that he has the same info as he is her caregiver.

## 2023-04-06 NOTE — TELEPHONE ENCOUNTER
Ammy Grace and myself both worked on submitting a prior auth request.  I anticipate it being denied and being advised of what NSAID is preferred on the formulary since she has been on Celebrex since 2018.

## 2023-04-27 ENCOUNTER — OFFICE VISIT (OUTPATIENT)
Age: 88
End: 2023-04-27

## 2023-04-27 VITALS — HEIGHT: 62 IN | WEIGHT: 226 LBS | BODY MASS INDEX: 41.59 KG/M2

## 2023-04-27 DIAGNOSIS — G56.23 CUBITAL TUNNEL SYNDROME, BILATERAL: ICD-10-CM

## 2023-04-27 DIAGNOSIS — G56.03 BILATERAL CARPAL TUNNEL SYNDROME: ICD-10-CM

## 2023-04-27 DIAGNOSIS — M19.041 ARTHRITIS OF BOTH HANDS: Primary | ICD-10-CM

## 2023-04-27 DIAGNOSIS — M19.042 ARTHRITIS OF BOTH HANDS: Primary | ICD-10-CM

## 2023-04-27 NOTE — PROGRESS NOTES
Dorcas Hines is a 82 y.o. female right handed retiree. Worker's Compensation and legal considerations: none    Chief Complaint   Patient presents with    Hand Pain     bilaterl     Pain Score:  10 - Worst pain ever    HPI: Patient presents today with a longstanding history of bilateral hand numbness, tingling, pain and weakness. She reports a previous EMG years ago that was positive for carpal tunnel syndrome on both sides. Date of onset: Chronic  Injury: No  Prior Treatment:  No    ROS: Review of Systems - General ROS: negative except HPI    Past Medical History:   Diagnosis Date    Decreased calculated GFR 12/5/2014    DVT (deep venous thrombosis) ([facility] [address] 10/02/2014    No DVT bilaterally. High vitamin D level 12/6/2014    Vitamin D 25-Hydroxy (12/06/2014) = 138.9     History of echocardiogram 08/29/2014    EF 60%. No WMA. Mild conc LVH. Gr 1 DDfx. Mild RVE. Mild PATRICK. Sm right & sm left pleural effusion.       Hypercholesterolemia     Numbness in both hands     Osteoarthritis     Osteoarthritis of right hip     Osteoporosis     Peripheral neuropathy     Peripheral vascular disease (HCC)     Postoperative anemia due to acute blood loss     Vaginal fibroids     resolved s/p hyst    Varicose veins        Past Surgical History:   Procedure Laterality Date    CHOLECYSTECTOMY      HERNIA REPAIR  2010    abdominal, had 2nd surgery for infection    HIP FRACTURE SURGERY Left     HYSTERECTOMY (CERVIX STATUS UNKNOWN)  1985    OTHER SURGICAL HISTORY  03/07/2018    Left foot toe surgery-ZULEIKA LINDA @ 1 foot 2 foot    TOTAL HIP ARTHROPLASTY Right 12/02/2014    S/P Right total hip replacement (12/02/2014 - Dr. Jose R Ryder)    TOTAL KNEE ARTHROPLASTY  2006    left    TOTAL KNEE ARTHROPLASTY  2001    right    VASCULAR SURGERY      Bilateral leg vein stripping        Current Outpatient Medications   Medication Sig Dispense Refill    diclofenac (VOLTAREN) 75 MG EC tablet Take 1 tablet by mouth 2 times daily 60 tablet 3

## 2023-05-11 ENCOUNTER — OFFICE VISIT (OUTPATIENT)
Age: 88
End: 2023-05-11

## 2023-05-11 DIAGNOSIS — M25.552 ACUTE HIP PAIN, LEFT: ICD-10-CM

## 2023-05-11 DIAGNOSIS — M06.9 RHEUMATOID ARTHRITIS, INVOLVING UNSPECIFIED SITE, UNSPECIFIED WHETHER RHEUMATOID FACTOR PRESENT (HCC): ICD-10-CM

## 2023-05-11 DIAGNOSIS — Z96.641 PRESENCE OF RIGHT ARTIFICIAL HIP JOINT: Primary | ICD-10-CM

## 2023-05-11 DIAGNOSIS — Z87.81 H/O FRACTURE OF LEFT HIP: ICD-10-CM

## 2023-05-11 DIAGNOSIS — M16.12 PRIMARY OSTEOARTHRITIS OF LEFT HIP: ICD-10-CM

## 2023-05-11 DIAGNOSIS — M70.61 TROCHANTERIC BURSITIS, RIGHT HIP: ICD-10-CM

## 2023-05-11 DIAGNOSIS — M70.62 TROCHANTERIC BURSITIS, LEFT HIP: ICD-10-CM

## 2023-05-11 DIAGNOSIS — M25.551 ACUTE RIGHT HIP PAIN: ICD-10-CM

## 2023-05-11 RX ORDER — DICLOFENAC EPOLAMINE 0.01 G/1
1 SYSTEM TOPICAL 2 TIMES DAILY
Qty: 60 PATCH | Refills: 1 | Status: SHIPPED | OUTPATIENT
Start: 2023-05-11

## 2023-05-11 NOTE — PROGRESS NOTES
Patient: Ashley Worrell                MRN: [id number]       SSN:   YOB: 1935        AGE: 82 y.o. SEX: female  There is no height or weight on file to calculate BMI. PCP: Henny Joya MD  05/11/23    Is here today with her son to reevaluate bilateral hip pain is to be remembered she had a right total hip replacement by me in 2014 and then she broke her hip on the left side and Dr. Lucas Rosario did a 2016 TFN. The pain is actually gotten better they had some pain patches which they ran out of and no groin discomfort denies fevers chills and otherwise has been feeling well she we organized a home physical therapy for her which is helping and the exam today she is in a wheelchair she has a little bit of ulnar contracture of her fourth and fifth digits on the right side little numbness in the ulnar distribution the hips rotate adequately little bit of peripheral edema distally no calf pain no evidence of for DVT and. Hips rotate adequately on both sides well-healed incisions    At this point I think that she has had quite symptomatic bursitis which is now quiescent and prescribed her Flector patches and they are welcome to return for cortisone injection as needed its been a pleasure to share care we did discuss the role of surgery I recommend nonoperative measures for her and we will see them back in the not-too-distant future for follow-up assessment thank you                I have personally reviewed the previous AP pelvis x-rays, obtained 01/06/23. Shows Bishop right hip replacement which appears to be in good position and alignment in 2014. TFN of left hip in 2016.      REVIEW OF SYSTEMS:      CON: negative  EYE: negative   ENT: negative  RESP: negative  GI:    negative   :  negative  MSK: Positive  A twelve point review of systems was completed, positives noted and all other systems were reviewed and are negative          Past Medical History:   Diagnosis Date    Decreased

## 2023-05-17 RX ORDER — LORATADINE 10 MG/1
TABLET ORAL
Qty: 30 TABLET | Refills: 0 | Status: SHIPPED | OUTPATIENT
Start: 2023-05-17

## 2023-05-18 DIAGNOSIS — G62.9 PERIPHERAL POLYNEUROPATHY: Primary | ICD-10-CM

## 2023-05-18 DIAGNOSIS — Z51.81 ENCOUNTER FOR THERAPEUTIC DRUG MONITORING: ICD-10-CM

## 2023-05-18 RX ORDER — GABAPENTIN 300 MG/1
CAPSULE ORAL
Qty: 60 CAPSULE | Refills: 0 | OUTPATIENT
Start: 2023-05-18 | End: 2023-06-17

## 2023-05-18 NOTE — TELEPHONE ENCOUNTER
Refill denied, needs UDS and CSA. UDS ordered, we will need results prior to refill. May sign CSA at next scheduled appointment with PCP. Thank you.

## 2023-05-19 NOTE — TELEPHONE ENCOUNTER
Lm for pt informing her Gabapentin was denied and that she needed to have a UDS prior to any refill. I also mentioned she would need to sign agreement at next office visit. Instructed pt to call back with any questions.

## 2023-05-20 DIAGNOSIS — G62.9 PERIPHERAL POLYNEUROPATHY: Primary | ICD-10-CM

## 2023-05-22 ENCOUNTER — TELEPHONE (OUTPATIENT)
Facility: CLINIC | Age: 88
End: 2023-05-22

## 2023-05-22 ENCOUNTER — HOSPITAL ENCOUNTER (OUTPATIENT)
Facility: HOSPITAL | Age: 88
Setting detail: RECURRING SERIES
Discharge: HOME | End: 2023-05-25
Payer: MEDICAID

## 2023-05-22 PROCEDURE — 97166 OT EVAL MOD COMPLEX 45 MIN: CPT

## 2023-05-22 RX ORDER — GABAPENTIN 300 MG/1
300 CAPSULE ORAL 2 TIMES DAILY
Qty: 180 CAPSULE | Refills: 0 | Status: SHIPPED | OUTPATIENT
Start: 2023-05-22 | End: 2023-08-20

## 2023-05-22 NOTE — TELEPHONE ENCOUNTER
PLEASE FORWARD TO PCP WITH MEDICATIONS ATTACHED TO MESSAGE. This patient contacted the office for the following prescriptions to be refilled:    Medication requested :     DrugName: gabapentin (NEURONTIN) 300 MG capsule     Pharmacy: [address] [facility], [facility] [address]     PCP: Che Araujo MD  LOV: 5/11/23 (look in previous encounters if not listed)  NOV DMA: 6/23/2023  FUTURE APPT:   Future Appointments   Date Time Provider Atif Leyla   5/22/2023  2:40 PM Yovana Tavera [facility]   6/23/2023  1:00 PM Che Araujo MD [facility]B         Thank you.

## 2023-05-22 NOTE — PROGRESS NOTES
OCCUPATIONAL THERAPY - DAILY TREATMENT NOTE    Patient Name: Evan Raya    Date: 2023    : 1935  Insurance: Payor: [facility] MEDICAID / Plan: ANEESH [facility] HEALTHKEEPERS PLUS / Product Type: *No Product type* /      Patient  verified Yes     Visit #   Current / Total 1 1   Time   In / Out 240 320   Total Treatment Time 40   Pain   In / Out 10 10   Subjective Functional Status/Changes: \"We just wanted to see what you had. \"   Changes to:  Meds, Allergies, Med Hx, Sx Hx? If yes, update Summary List no       TREATMENT AREA =  Left hand pain [M79.642]  Right hand pain [M79.641]    OBJECTIVE  Eval - 35 mins  Therapeutic Procedures: Tx Min Billable or 1:1 Min (if diff from Tx Min) Procedure, Rationale, Specifics   5  90953 Therapeutic Activity (timed):  use of dynamic activities replicating functional movements to decrease pain (see flow sheet as applicable)    Edu on sleeping positions   Edu on HEP median and ulnar nerve glides  Edu on anatomy  Edu and trial of built up . 5  [facility] Totals Reminder: bill using total billable min of TIMED therapeutic procedures (example: do not include dry needle or estim unattended, both untimed codes, in totals to left)  8-22 min = 1 unit; 23-37 min = 2 units; 38-52 min = 3 units; 53-67 min = 4 units; 68-82 min = 5 units   Total Total         Billed concurrently with other treatments Patient Education:  Reviewed HEP     Objective Information/Functional Measures/Assessment    See POC           []  See Progress Note/Re certification        Progress toward goals / Updated goals:    Short Term Goals: To be accomplished in 1 visit  Patient to be evaluated to determine if skilled OPOT services are warranted. Status at Eval: goal met.          PLAN  [x]  Continue Plan of Care  []  Upgrade activities as tolerated    []  Discharge due to :    []  Other:      Therapist: Juliano Zurita OT    Date: 2023 Time: 4:35 PM     Future
Diagnosis, prognosis, self care and exercises [x]  Plan of care has been reviewed with MARROQUIN    Certification Period: 5/22/23- EZEQUIEL Tavera OT       5/22/2023       12:20 PM  ===================================================================  I certify that the above Therapy Services are being furnished while the patient is under my care. I agree with the treatment plan and certify that this therapy is necessary. [de-identified] Signature:_________________________   DATE:_________   TIME:________                           Julisa Virginia, DO    ** Signature, Date and Time must be completed for valid certification **  [facility] or you may fax the signed copy to [phone]. Thank you.

## 2023-05-31 ENCOUNTER — TELEPHONE (OUTPATIENT)
Age: 88
End: 2023-05-31

## 2023-05-31 DIAGNOSIS — G56.03 BILATERAL CARPAL TUNNEL SYNDROME: ICD-10-CM

## 2023-05-31 DIAGNOSIS — G56.23 CUBITAL TUNNEL SYNDROME, BILATERAL: Primary | ICD-10-CM

## 2023-05-31 NOTE — TELEPHONE ENCOUNTER
Patient son Rob Gomez is requesting a call back states that therapy is not working for his mother and would like to proceed with a test that was discussed at the visit.         [phone]

## 2023-06-02 ENCOUNTER — HOSPITAL ENCOUNTER (EMERGENCY)
Facility: HOSPITAL | Age: 88
Discharge: HOME | End: 2023-06-02
Attending: EMERGENCY MEDICINE
Payer: MEDICAID

## 2023-06-02 VITALS
BODY MASS INDEX: 41.96 KG/M2 | HEIGHT: 62 IN | DIASTOLIC BLOOD PRESSURE: 70 MMHG | SYSTOLIC BLOOD PRESSURE: 134 MMHG | TEMPERATURE: 97.8 F | OXYGEN SATURATION: 98 % | RESPIRATION RATE: 16 BRPM | HEART RATE: 67 BPM | WEIGHT: 228 LBS

## 2023-06-02 DIAGNOSIS — R49.0 HOARSENESS OF VOICE: Primary | ICD-10-CM

## 2023-06-02 LAB
DEPRECATED S PYO AG THROAT QL EIA: NEGATIVE
SARS-COV-2 RDRP RESP QL NAA+PROBE: NOT DETECTED
SOURCE: NORMAL

## 2023-06-02 PROCEDURE — 87635 SARS-COV-2 COVID-19 AMP PRB: CPT

## 2023-06-02 PROCEDURE — 99283 EMERGENCY DEPT VISIT LOW MDM: CPT

## 2023-06-02 PROCEDURE — 6360000002 HC RX W HCPCS: Performed by: EMERGENCY MEDICINE

## 2023-06-02 PROCEDURE — 87070 CULTURE OTHR SPECIMN AEROBIC: CPT

## 2023-06-02 PROCEDURE — 87880 STREP A ASSAY W/OPTIC: CPT

## 2023-06-02 RX ORDER — DEXAMETHASONE SODIUM PHOSPHATE 4 MG/ML
10 INJECTION, SOLUTION INTRA-ARTICULAR; INTRALESIONAL; INTRAMUSCULAR; INTRAVENOUS; SOFT TISSUE
Status: COMPLETED | OUTPATIENT
Start: 2023-06-02 | End: 2023-06-02

## 2023-06-02 RX ADMIN — DEXAMETHASONE SODIUM PHOSPHATE 10 MG: 4 INJECTION, SOLUTION INTRAMUSCULAR; INTRAVENOUS at 18:26

## 2023-06-02 ASSESSMENT — PAIN - FUNCTIONAL ASSESSMENT: PAIN_FUNCTIONAL_ASSESSMENT: NONE - DENIES PAIN

## 2023-06-02 ASSESSMENT — LIFESTYLE VARIABLES: HOW OFTEN DO YOU HAVE A DRINK CONTAINING ALCOHOL: NEVER

## 2023-06-02 NOTE — ED PROVIDER NOTES
[facility] EMERGENCY DEPT  EMERGENCY DEPARTMENT ENCOUNTER    Patient Name: Hari Olivarez  MRN: [id number]  YOB: 1935  Provider: Marlyn Lambert DO  PCP: Addison Singh MD   Time/Date of evaluation: 6:45 PM EDT on 6/2/23    History of Presenting Illness     History Provided by: Patient  History is limited by: Nothing     HISTORY:   Hari Olivarez is a 82 y.o. female brought in by her son with a chief complaint of hoarse voice for the past 2 weeks. He states that he has been treating the patient with over-the-counter medications without improvement of her symptoms. Son states that the patient has a history of strep pharyngitis. He is requesting a strep test to rule out strep pharyngitis. Patient denies any cough, fever, difficulty swallowing, runny nose, headache, chest pain, shortness of breath, abdominal pain, vomiting, diarrhea, or back pain. Nursing Notes were all reviewed and agreed with or any disagreements were addressed in the HPI. Past History     PAST MEDICAL HISTORY:  Past Medical History:   Diagnosis Date    Decreased calculated GFR 12/5/2014    DVT (deep venous thrombosis) ([facility] [address] 10/02/2014    No DVT bilaterally. High vitamin D level 12/6/2014    Vitamin D 25-Hydroxy (12/06/2014) = 138.9     History of echocardiogram 08/29/2014    EF 60%. No WMA. Mild conc LVH. Gr 1 DDfx. Mild RVE. Mild PATRICK. Sm right & sm left pleural effusion.       Hypercholesterolemia     Numbness in both hands     Osteoarthritis     Osteoarthritis of right hip     Osteoporosis     Peripheral neuropathy     Peripheral vascular disease (HCC)     Postoperative anemia due to acute blood loss     Vaginal fibroids     resolved s/p hyst    Varicose veins        PAST SURGICAL HISTORY:  Past Surgical History:   Procedure Laterality Date    CHOLECYSTECTOMY      HERNIA REPAIR  2010    abdominal, had 2nd surgery for infection    HIP FRACTURE SURGERY Left     HYSTERECTOMY (CERVIX STATUS UNKNOWN)  [address] [place] [address]

## 2023-06-02 NOTE — ED TRIAGE NOTES
Pt to ED for having hoarse throat x 2 weeks. Pt denies any other symptoms. Denies sore throat, URI, fever, cough, difficulty swallowing, swelling in mouth. Pt maintaining secretions. No relief from sudafed, cough drops, chicken noodle soup, hot tea with honey.

## 2023-06-04 LAB
BACTERIA SPEC CULT: NORMAL
SERVICE CMNT-IMP: NORMAL

## 2023-06-22 ASSESSMENT — ENCOUNTER SYMPTOMS: RESPIRATORY NEGATIVE: 1

## 2023-06-23 ENCOUNTER — OFFICE VISIT (OUTPATIENT)
Facility: CLINIC | Age: 88
End: 2023-06-23
Payer: MEDICAID

## 2023-06-23 VITALS
OXYGEN SATURATION: 96 % | HEART RATE: 72 BPM | SYSTOLIC BLOOD PRESSURE: 114 MMHG | DIASTOLIC BLOOD PRESSURE: 70 MMHG | RESPIRATION RATE: 16 BRPM

## 2023-06-23 DIAGNOSIS — J30.9 ALLERGIC RHINITIS, UNSPECIFIED SEASONALITY, UNSPECIFIED TRIGGER: ICD-10-CM

## 2023-06-23 DIAGNOSIS — G56.20 CUBITAL TUNNEL SYNDROME, UNSPECIFIED LATERALITY: ICD-10-CM

## 2023-06-23 DIAGNOSIS — M19.041 PRIMARY OSTEOARTHRITIS OF BOTH HANDS: ICD-10-CM

## 2023-06-23 DIAGNOSIS — M19.042 PRIMARY OSTEOARTHRITIS OF BOTH HANDS: ICD-10-CM

## 2023-06-23 DIAGNOSIS — G56.03 BILATERAL CARPAL TUNNEL SYNDROME: ICD-10-CM

## 2023-06-23 DIAGNOSIS — M70.71 BURSITIS OF BOTH HIPS, UNSPECIFIED BURSA: ICD-10-CM

## 2023-06-23 DIAGNOSIS — E78.00 PURE HYPERCHOLESTEROLEMIA, UNSPECIFIED: Primary | ICD-10-CM

## 2023-06-23 DIAGNOSIS — M70.72 BURSITIS OF BOTH HIPS, UNSPECIFIED BURSA: ICD-10-CM

## 2023-06-23 PROCEDURE — 99214 OFFICE O/P EST MOD 30 MIN: CPT | Performed by: FAMILY MEDICINE

## 2023-06-23 PROCEDURE — 1123F ACP DISCUSS/DSCN MKR DOCD: CPT | Performed by: FAMILY MEDICINE

## 2023-06-23 RX ORDER — CETIRIZINE HYDROCHLORIDE 10 MG/1
10 TABLET ORAL DAILY PRN
Qty: 30 TABLET | Refills: 12 | Status: SHIPPED | OUTPATIENT
Start: 2023-06-23

## 2023-06-23 RX ORDER — CETIRIZINE HYDROCHLORIDE 10 MG/1
10 TABLET ORAL DAILY
COMMUNITY
End: 2023-06-23 | Stop reason: SDUPTHER

## 2023-06-23 RX ORDER — CETIRIZINE HYDROCHLORIDE 10 MG/1
10 TABLET ORAL DAILY
COMMUNITY
End: 2023-06-23 | Stop reason: CLARIF

## 2023-06-23 RX ORDER — FLUTICASONE PROPIONATE 50 MCG
2 SPRAY, SUSPENSION (ML) NASAL DAILY
Qty: 16 G | Refills: 12 | Status: SHIPPED | OUTPATIENT
Start: 2023-06-23

## 2023-07-22 ENCOUNTER — HOSPITAL ENCOUNTER (OUTPATIENT)
Facility: HOSPITAL | Age: 88
Discharge: HOME | End: 2023-07-25

## 2023-07-22 LAB — LABCORP SPECIMEN COLLECTION: NORMAL

## 2023-07-23 LAB
ALBUMIN SERPL-MCNC: 4 G/DL (ref 3.7–4.7)
ALBUMIN/GLOB SERPL: 1.5 {RATIO} (ref 1.2–2.2)
ALP SERPL-CCNC: 121 IU/L (ref 44–121)
ALT SERPL-CCNC: 13 IU/L (ref 0–32)
AST SERPL-CCNC: 15 IU/L (ref 0–40)
BILIRUB SERPL-MCNC: 0.4 MG/DL (ref 0–1.2)
BUN SERPL-MCNC: 11 MG/DL (ref 8–27)
BUN/CREAT SERPL: 12 (ref 12–28)
CALCIUM SERPL-MCNC: 9.5 MG/DL (ref 8.7–10.3)
CHLORIDE SERPL-SCNC: 105 MMOL/L (ref 96–106)
CHOLEST SERPL-MCNC: 283 MG/DL (ref 100–199)
CO2 SERPL-SCNC: 24 MMOL/L (ref 20–29)
CREAT SERPL-MCNC: 0.89 MG/DL (ref 0.57–1)
EGFRCR SERPLBLD CKD-EPI 2021: 63 ML/MIN/1.73
GLOBULIN SER CALC-MCNC: 2.6 G/DL (ref 1.5–4.5)
GLUCOSE SERPL-MCNC: 93 MG/DL (ref 70–99)
HDLC SERPL-MCNC: 57 MG/DL
LABORATORY COMMENT REPORT: ABNORMAL
LDLC SERPL CALC-MCNC: 199 MG/DL (ref 0–99)
POTASSIUM SERPL-SCNC: 4.6 MMOL/L (ref 3.5–5.2)
PROT SERPL-MCNC: 6.6 G/DL (ref 6–8.5)
SODIUM SERPL-SCNC: 143 MMOL/L (ref 134–144)
TRIGL SERPL-MCNC: 147 MG/DL (ref 0–149)
VLDLC SERPL CALC-MCNC: 27 MG/DL (ref 5–40)

## 2023-07-26 ENCOUNTER — PROCEDURE VISIT (OUTPATIENT)
Age: 88
End: 2023-07-26
Payer: MEDICAID

## 2023-07-26 VITALS
DIASTOLIC BLOOD PRESSURE: 73 MMHG | OXYGEN SATURATION: 95 % | SYSTOLIC BLOOD PRESSURE: 123 MMHG | TEMPERATURE: 97.9 F | WEIGHT: 226.8 LBS | RESPIRATION RATE: 16 BRPM | BODY MASS INDEX: 41.73 KG/M2 | HEART RATE: 69 BPM | HEIGHT: 62 IN

## 2023-07-26 DIAGNOSIS — R20.0 NUMBNESS IN BOTH HANDS: Primary | ICD-10-CM

## 2023-07-26 PROBLEM — E66.01 SEVERE OBESITY WITH BODY MASS INDEX (BMI) OF 35.0 TO 39.9 WITH SERIOUS COMORBIDITY (HCC): Status: ACTIVE | Noted: 2018-09-18

## 2023-07-26 PROBLEM — I83.90 VARICOSE VEINS: Status: ACTIVE | Noted: 2023-07-26

## 2023-07-26 PROBLEM — G56.03 BILATERAL CARPAL TUNNEL SYNDROME: Status: ACTIVE | Noted: 2020-06-04

## 2023-07-26 PROBLEM — E55.9 VITAMIN D DEFICIENCY: Status: ACTIVE | Noted: 2020-06-04

## 2023-07-26 PROCEDURE — 95911 NRV CNDJ TEST 9-10 STUDIES: CPT | Performed by: PHYSICAL MEDICINE & REHABILITATION

## 2023-07-26 PROCEDURE — 95886 MUSC TEST DONE W/N TEST COMP: CPT | Performed by: PHYSICAL MEDICINE & REHABILITATION

## 2023-07-26 NOTE — PROGRESS NOTES
Wrist-Dig II 14 NR -    Left Radial Sensory   Forearm-Wrist 1.70  < 2.2 2.4  < 2.8 18  > 7 Forearm-Wrist 10 59 -    Right Radial Sensory   Forearm-Wrist 1.60  < 2.2 2.2  < 2.8 14  > 7 Forearm-Wrist 10 63 -    Left Ulnar Sensory   Wrist-Dig V NR  < 3.1 NR  < 4.0 NR  > 7 Wrist-Dig V 14 NR -    Right Ulnar Sensory   Wrist-Dig V NR  < 3.1 NR  < 4.0 NR  > 7 Wrist-Dig V 14 NR -      EMG+     Side Muscle Nerve Root Ins Act Fibs Psw Fascics Other Amp Dur Poly Recrt Activation Comment Misc   Right Biceps Musculocut C5-C6 Nml Nml Nml Nml 0 Nml Nml 0 Nml Nml     Right Triceps Radial C6-C8 Nml Nml Nml Nml 0 Nml Nml 0 Nml Nml     Right Pronator Teres Median C6-C7 Nml Nml Nml Nml 0 Nml Nml 0 Nml Nml     Right FDI Median,  Ulnar C8-T1 Incr 2+ 3+ Nml 0 Nml Nml 0 Nml Poor     Right APB Median C8-T1 Incr 2+ 3+ Nml 0 Nml Nml 0 Nml Poor     Right FCU Ulnar C8-T1 Nml Nml Nml Nml 0 Nml Nml 0 Nml Nml     Left Biceps Musculocut C5-C6 Nml Nml Nml Nml 0 Nml Nml 0 Nml Nml     Left APB Median C8-T1 Incr 2+ 3+ Nml 0 Nml Nml 0 Nml Poor     Left Triceps Radial C6-C8 Nml Nml Nml Nml 0 Nml Nml 0 Nml Nml     Left Pronator Teres Median C6-C7 Nml Nml Nml Nml 0 Nml Nml 0 Nml Nml     Left FDI Median,  Ulnar C8-T1 Nml Nml Nml Nml 0 Nml Nml 0 Nml Nml             Waveforms:    Motor                Sensory                          [facility]  OFFICE PROCEDURE PROGRESS NOTE        Chart reviewed for the following:   Linda MARLOW, have reviewed the History, Physical and updated the Allergic reactions for [address] performed immediately prior to start of procedure:   Linda MARLOW, have performed the following reviews on Pilar Michael prior to the start of the procedure:            * Patient was identified by name and date of birth   * Agreement on procedure being performed was verified  * Risks and Benefits explained to the patient  * Procedure site verified and marked as necessary  * Patient was

## 2023-07-31 ENCOUNTER — TELEMEDICINE (OUTPATIENT)
Facility: CLINIC | Age: 88
End: 2023-07-31
Payer: MEDICAID

## 2023-07-31 DIAGNOSIS — G62.9 PERIPHERAL POLYNEUROPATHY: ICD-10-CM

## 2023-07-31 DIAGNOSIS — Z00.00 MEDICARE ANNUAL WELLNESS VISIT, SUBSEQUENT: Primary | ICD-10-CM

## 2023-07-31 DIAGNOSIS — Z71.89 ACP (ADVANCE CARE PLANNING): ICD-10-CM

## 2023-07-31 PROCEDURE — G0439 PPPS, SUBSEQ VISIT: HCPCS | Performed by: FAMILY MEDICINE

## 2023-07-31 PROCEDURE — 99497 ADVNCD CARE PLAN 30 MIN: CPT | Performed by: FAMILY MEDICINE

## 2023-07-31 PROCEDURE — 1123F ACP DISCUSS/DSCN MKR DOCD: CPT | Performed by: FAMILY MEDICINE

## 2023-07-31 RX ORDER — GABAPENTIN 300 MG/1
CAPSULE ORAL
Qty: 270 CAPSULE | Refills: 0 | Status: SHIPPED | OUTPATIENT
Start: 2023-07-31 | End: 2023-10-29

## 2023-07-31 ASSESSMENT — LIFESTYLE VARIABLES: HOW OFTEN DO YOU HAVE A DRINK CONTAINING ALCOHOL: NEVER

## 2023-07-31 ASSESSMENT — PATIENT HEALTH QUESTIONNAIRE - PHQ9: DEPRESSION UNABLE TO ASSESS: FUNCTIONAL CAPACITY MOTIVATION LIMITS ACCURACY

## 2023-07-31 NOTE — PROGRESS NOTES
No chief complaint on file. There were no vitals filed for this visit. Depression: Not at risk    PHQ-2 Score: 0        No flowsheet data found. Sara Simmons \"Have you been to the ER, urgent care clinic since your last visit? Hospitalized since your last visit? \"  No     2. \"Have you seen or consulted any other health care providers outside of the [address] [place] [address] since your last visit? \"  No      3. For patients aged 43-73: Has the patient had a colonoscopy / FIT/ Cologuard? N/A     If the patient is female:    4. For patients aged 43-66: Has the patient had a mammogram within the past 2 years? N/A    5. For patients aged 21-65: Has the patient had a pap smear?  N/A

## 2023-07-31 NOTE — PROGRESS NOTES
Advance Care Planning     Advance Care Planning (ACP) Physician/NP/PA Conversation    Date of Conversation: 7/31/2023  Conducted with: Patient with Decision Making Capacity    Healthcare Decision Maker:    Primary Decision Maker: Lucy Sawyer - [phone]  Click here to complete [address] including selection of the Healthcare Decision Maker Relationship (ie \"Primary\"). Care Preferences:    Hospitalization: \"If your health worsens and it becomes clear that your chance of recovery is unlikely, what would be your preference regarding hospitalization? \"  The patient would prefer comfort-focused treatment without hospitalization. Ventilation: \"If you were unable to breath on your own and your chance of recovery was unlikely, what would be your preference about the use of a ventilator (breathing machine) if it was available to you? \"  The patient is unsure. Resuscitation: \"In the event your heart stopped as a result of an underlying serious health condition, would you want attempts made to restart your heart, or would you prefer a natural death? \"  Yes, attempt to resuscitate.     Conversation Outcomes / Follow-Up Plan:  ACP in process - information provided, considering goals and options  Reviewed DNR/DNI and patient elects Full Code (Attempt Resuscitation)    Length of Voluntary ACP Conversation in minutes:  16 minutes    Sedrick Desai MD

## 2023-07-31 NOTE — PATIENT INSTRUCTIONS
may be subject to a deductible, co-insurance, and/or copay. Some of these benefits include a comprehensive review of your medical history including lifestyle, illnesses that may run in your family, and various assessments and screenings as appropriate. After reviewing your medical record and screening and assessments performed today your provider may have ordered immunizations, labs, imaging, and/or referrals for you. A list of these orders (if applicable) as well as your Preventive Care list are included within your After Visit Summary for your review. Other Preventive Recommendations:    A preventive eye exam performed by an eye specialist is recommended every 1-2 years to screen for glaucoma; cataracts, macular degeneration, and other eye disorders. A preventive dental visit is recommended every 6 months. Try to get at least 150 minutes of exercise per week or 10,000 steps per day on a pedometer . Order or download the FREE \"Exercise & Physical Activity: Your Everyday Guide\" from [employer]. Call 8-435.876.8024 or search [employer] online. You need 1613-4165 mg of calcium and 4461-8768 IU of vitamin D per day. It is possible to meet your calcium requirement with diet alone, but a vitamin D supplement is usually necessary to meet this goal.  When exposed to the sun, use a sunscreen that protects against both UVA and UVB radiation with an SPF of 30 or greater. Reapply every 2 to 3 hours or after sweating, drying off with a towel, or swimming. Always wear a seat belt when traveling in a car. Always wear a helmet when riding a bicycle or motorcycle.

## 2023-07-31 NOTE — PROGRESS NOTES
Medicare Annual Wellness Visit    Liyah Dickey is here for Medicare AWV and Peripheral Neuropathy ( Patient complaints of increased pain in hands and feet wonders if violeta can be increased or if there is alternative . Shes complaining they are numb.)    Assessment & Plan   Medicare annual wellness visit, subsequent  ACP (advance care planning)  Peripheral polyneuropathy  -     gabapentin (NEURONTIN) 300 MG capsule; Take 1 capsule by mouth every morning AND 2 capsules nightly. Do all this for 90 days. Max Daily Amount: 900 mg., Disp-270 capsule, R-0Normal    Recommendations for Preventive Services Due: see orders and patient instructions/AVS.  Recommended screening schedule for the next 5-10 years is provided to the patient in written form: see Patient Instructions/AVS.     No follow-ups on file. Subjective       Patient's complete Health Risk Assessment and screening values have been reviewed and are found in Flowsheets. The following problems were reviewed today and where indicated follow up appointments were made and/or referrals ordered. Positive Risk Factor Screenings with Interventions:       Cognitive: Words recalled: 2 Words Recalled           Total Score Interpretation: Abnormal Mini-Cog      Interventions:  Patient declines any further evaluation or treatment        General HRA Questions:  Select all that apply: (!) New or Increased Pain (arm and hand pain)    Pain Interventions:  Medication adjusted: increase violeta       Weight and Activity:  Physical Activity: Insufficiently Active    Days of Exercise per Week: 7 days    Minutes of Exercise per Session: 10 min     On average, how many days per week do you engage in moderate to strenuous exercise (like a brisk walk)?: 7 days  Have you lost any weight without trying in the past 3 months?: No  There is no height or weight on file to calculate BMI.  (!) Abnormal  Obesity Interventions:  Patient comments: pt walks on her porch each day and does some

## 2023-08-31 ENCOUNTER — OFFICE VISIT (OUTPATIENT)
Age: 88
End: 2023-08-31
Payer: MEDICAID

## 2023-08-31 VITALS — HEIGHT: 62 IN | BODY MASS INDEX: 41.48 KG/M2

## 2023-08-31 DIAGNOSIS — G62.9 PERIPHERAL POLYNEUROPATHY: Primary | ICD-10-CM

## 2023-08-31 DIAGNOSIS — M24.541 CONTRACTURE, RIGHT HAND: ICD-10-CM

## 2023-08-31 PROCEDURE — 1123F ACP DISCUSS/DSCN MKR DOCD: CPT | Performed by: ORTHOPAEDIC SURGERY

## 2023-08-31 PROCEDURE — 99214 OFFICE O/P EST MOD 30 MIN: CPT | Performed by: ORTHOPAEDIC SURGERY

## 2023-08-31 NOTE — PROGRESS NOTES
Aurelia Flowers is a 82 y.o. female right handed retiree. Worker's Compensation and legal considerations: none    Chief Complaint   Patient presents with    Hand Pain     Bilateral hand pain     Pain Score:  10 - Worst pain ever    HPI: Patient presents today with a longstanding history of bilateral hand numbness, tingling, pain and weakness. She reports a previous EMG years ago that was positive for carpal tunnel syndrome on both sides. Date of onset: Chronic  Injury: No  Prior Treatment:  No    ROS: Review of Systems - General ROS: negative except HPI    Past Medical History:   Diagnosis Date    Decreased calculated GFR 12/5/2014    DVT (deep venous thrombosis) ([address]) 10/02/2014    No DVT bilaterally. High vitamin D level 12/6/2014    Vitamin D 25-Hydroxy (12/06/2014) = 138.9     History of echocardiogram 08/29/2014    EF 60%. No WMA. Mild conc LVH. Gr 1 DDfx. Mild RVE. Mild PATRICK. Sm right & sm left pleural effusion.       Hypercholesterolemia     Numbness in both hands     Osteoarthritis     Osteoarthritis of right hip     Osteoporosis     Peripheral neuropathy     Peripheral vascular disease (HCC)     Postoperative anemia due to acute blood loss     Vaginal fibroids     resolved s/p hyst    Varicose veins        Past Surgical History:   Procedure Laterality Date    CHOLECYSTECTOMY      HERNIA REPAIR  2010    abdominal, had 2nd surgery for infection    HIP FRACTURE SURGERY Left     HYSTERECTOMY (CERVIX STATUS UNKNOWN)  1985    OTHER SURGICAL HISTORY  03/07/2018    Left foot toe surgery-ZULEIKA LINDA @ 1 foot 2 foot    TOTAL HIP ARTHROPLASTY Right 12/02/2014    S/P Right total hip replacement (12/02/2014 - Dr. David Ha)    TOTAL KNEE ARTHROPLASTY  2006    left    TOTAL KNEE ARTHROPLASTY  2001    right    VASCULAR SURGERY      Bilateral leg vein stripping        Current Outpatient Medications   Medication Sig Dispense Refill    gabapentin (NEURONTIN) 300 MG capsule Take 1 capsule by mouth every morning AND 2

## 2023-09-25 ENCOUNTER — OFFICE VISIT (OUTPATIENT)
Facility: CLINIC | Age: 88
End: 2023-09-25
Payer: MEDICAID

## 2023-09-25 VITALS
OXYGEN SATURATION: 97 % | SYSTOLIC BLOOD PRESSURE: 118 MMHG | DIASTOLIC BLOOD PRESSURE: 68 MMHG | HEART RATE: 71 BPM | BODY MASS INDEX: 41.15 KG/M2 | TEMPERATURE: 98.2 F | WEIGHT: 225 LBS

## 2023-09-25 DIAGNOSIS — G62.9 PERIPHERAL POLYNEUROPATHY: ICD-10-CM

## 2023-09-25 DIAGNOSIS — E78.00 PURE HYPERCHOLESTEROLEMIA, UNSPECIFIED: Primary | ICD-10-CM

## 2023-09-25 DIAGNOSIS — E78.00 PURE HYPERCHOLESTEROLEMIA, UNSPECIFIED: ICD-10-CM

## 2023-09-25 PROCEDURE — 99213 OFFICE O/P EST LOW 20 MIN: CPT | Performed by: FAMILY MEDICINE

## 2023-09-25 PROCEDURE — 1123F ACP DISCUSS/DSCN MKR DOCD: CPT | Performed by: FAMILY MEDICINE

## 2023-09-25 ASSESSMENT — PATIENT HEALTH QUESTIONNAIRE - PHQ9
SUM OF ALL RESPONSES TO PHQ QUESTIONS 1-9: 0
SUM OF ALL RESPONSES TO PHQ QUESTIONS 1-9: 0
2. FEELING DOWN, DEPRESSED OR HOPELESS: 0
SUM OF ALL RESPONSES TO PHQ QUESTIONS 1-9: 0
SUM OF ALL RESPONSES TO PHQ9 QUESTIONS 1 & 2: 0
1. LITTLE INTEREST OR PLEASURE IN DOING THINGS: 0
SUM OF ALL RESPONSES TO PHQ QUESTIONS 1-9: 0

## 2023-09-25 NOTE — PROGRESS NOTES
ASSESSMENT/PLAN:  1. Pure hypercholesterolemia, unspecified  -     Lipid Panel; Future  -     Comprehensive Metabolic Panel; Future  Med compliance emphasized. 2. Peripheral polyneuropathy  Cont violeta. Care as per specialists      Return in about 3 months (around 12/25/2023) for HLD, lab review. SUBJECTIVE/OBJECTIVE:    Chief Complaint   Patient presents with    Cholesterol Problem    Discuss Labs         HPI    Liyah Dickey is a 82 y.o. female presenting today for    3 months  follow up of hld. Patient had labs on 7/22/23. Labs reviewed in detail with patient     Pt had f/u with hand surg for her b hand numbness and tingling. She was referred to OT. Patient does not need medication refills today. New concerns today: none      Pt declines flu shot today. Review of Systems   Constitutional: Negative. HENT: Negative. Respiratory: Negative. Cardiovascular: Negative. All other systems reviewed and are negative. Physical Exam  Vitals and nursing note reviewed. Constitutional:       General: She is not in acute distress. Appearance: Normal appearance. HENT:      Head: Normocephalic and atraumatic. Right Ear: External ear normal.      Left Ear: External ear normal.      Nose: Nose normal.      Mouth/Throat:      Mouth: Mucous membranes are moist.   Eyes:      Extraocular Movements: Extraocular movements intact. Conjunctiva/sclera: Conjunctivae normal.   Cardiovascular:      Rate and Rhythm: Normal rate and regular rhythm. Heart sounds: No murmur heard. No friction rub. No gallop. Pulmonary:      Effort: Pulmonary effort is normal.      Breath sounds: Normal breath sounds. No wheezing, rhonchi or rales. Musculoskeletal:         General: Normal range of motion. Cervical back: Normal range of motion. Skin:     General: Skin is warm and dry. Neurological:      Mental Status: She is alert and oriented to person, place, and time.

## 2023-09-25 NOTE — PROGRESS NOTES
Nedra Llanos presents today for   Chief Complaint   Patient presents with    Cholesterol Problem    Discuss Labs       Is someone accompanying this pt? Yes, Yue Aguilar (dgt in law)    Is the patient using any DME equipment during [address]? Yes    Depression Screenin/25/2023     1:40 PM 3/22/2023     1:31 PM 2022     9:11 AM 2021     4:10 PM 6/15/2021     4:02 PM   PHQ-9 Questionaire   Little interest or pleasure in doing things 0 0 0 0 0   Feeling down, depressed, or hopeless 0 0 0 0 0   PHQ-9 Total Score 0 0 0 0 0        ЕКАТЕРИНА 7-Anxiety        No data to display                 Learning Assessment:  No question data found. Fall Risk       No data to display                   Travel Screening:    Travel Screening       Question Response    Have you been in contact with someone who was sick? No / Unsure    Do you have any of the following new or worsening symptoms? None of these    Have you traveled internationally or domestically in the last month? No          Travel History   Travel since 23    No documented travel since 23          Health Maintenance reviewed and discussed and ordered per Provider.   Social Determinants of Health     Tobacco Use: Medium Risk (2023)    Patient History     Smoking Tobacco Use: Former     Smokeless Tobacco Use: Never     Passive Exposure: Not on file   Alcohol Use: Not At Risk (2023)    AUDIT-C     Frequency of Alcohol Consumption: Never     Average Number of Drinks: Not on file     Frequency of Binge Drinking: Not on file   Financial Resource Strain: Medium Risk (3/22/2023)    Overall Financial Resource Strain (CARDIA)     Difficulty of Paying Living Expenses: Somewhat hard   Food Insecurity: Food Insecurity Present (3/22/2023)    Hunger Vital Sign     Worried About Running Out of Food in the Last Year: Often true     Ran Out of Food in the Last Year: Often true   Transportation Needs: Unmet Transportation Needs (3/22/2023)    PRAPARE -

## 2023-10-08 ASSESSMENT — ENCOUNTER SYMPTOMS: RESPIRATORY NEGATIVE: 1

## 2023-11-01 ENCOUNTER — TELEPHONE (OUTPATIENT)
Facility: CLINIC | Age: 88
End: 2023-11-01

## 2023-11-01 NOTE — TELEPHONE ENCOUNTER
Pt son called about a shower transfer bench stated that [facility] said they sent a order for dr herrera and they are waiting on it

## 2023-12-13 ENCOUNTER — HOSPITAL ENCOUNTER (OUTPATIENT)
Facility: HOSPITAL | Age: 88
Discharge: HOME | End: 2023-12-16

## 2023-12-13 LAB — LABCORP SPECIMEN COLLECTION: NORMAL

## 2023-12-14 LAB
ALBUMIN SERPL-MCNC: 4 G/DL (ref 3.7–4.7)
ALBUMIN/GLOB SERPL: 1.6 {RATIO} (ref 1.2–2.2)
ALP SERPL-CCNC: 128 IU/L (ref 44–121)
ALT SERPL-CCNC: 9 IU/L (ref 0–44)
AST SERPL-CCNC: 15 IU/L (ref 0–40)
BILIRUB SERPL-MCNC: 0.7 MG/DL (ref 0–1.2)
BUN SERPL-MCNC: 12 MG/DL (ref 8–27)
BUN/CREAT SERPL: 13 (ref 10–24)
CALCIUM SERPL-MCNC: 9.4 MG/DL (ref 8.6–10.2)
CHLORIDE SERPL-SCNC: 104 MMOL/L (ref 96–106)
CHOLEST SERPL-MCNC: 201 MG/DL (ref 100–199)
CO2 SERPL-SCNC: 24 MMOL/L (ref 20–29)
CREAT SERPL-MCNC: 0.92 MG/DL (ref 0.76–1.27)
EGFRCR SERPLBLD CKD-EPI 2021: 81 ML/MIN/1.73
GLOBULIN SER CALC-MCNC: 2.5 G/DL (ref 1.5–4.5)
GLUCOSE SERPL-MCNC: 89 MG/DL (ref 70–99)
HDLC SERPL-MCNC: 56 MG/DL
LDLC SERPL CALC-MCNC: 130 MG/DL (ref 0–99)
POTASSIUM SERPL-SCNC: 4.3 MMOL/L (ref 3.5–5.2)
PROT SERPL-MCNC: 6.5 G/DL (ref 6–8.5)
SODIUM SERPL-SCNC: 142 MMOL/L (ref 134–144)
TRIGL SERPL-MCNC: 84 MG/DL (ref 0–149)
VLDLC SERPL CALC-MCNC: 15 MG/DL (ref 5–40)

## 2023-12-28 ENCOUNTER — TELEPHONE (OUTPATIENT)
Facility: CLINIC | Age: 88
End: 2023-12-28

## 2023-12-28 NOTE — TELEPHONE ENCOUNTER
Ajit Head called to check to see if we receive fax for patient.  Ajit Head is requesting a call back at [phone]

## 2023-12-28 NOTE — TELEPHONE ENCOUNTER
Spoke with Marcia Shin. She states that they have the medical necessity form that was needed. Marcia Shin was given alternate fax number if anything else need to be faxed.

## 2024-01-01 ASSESSMENT — ENCOUNTER SYMPTOMS: RESPIRATORY NEGATIVE: 1

## 2024-01-01 NOTE — PROGRESS NOTES
ASSESSMENT/PLAN:  1. Pure hypercholesterolemia, unspecified  Assessment & Plan:   Well-controlled, continue current medications  2. Peripheral polyneuropathy  Assessment & Plan:   At goal, continue current medications  3. Primary osteoarthritis of both hands  -     diclofenac sodium (VOLTAREN) 1 % GEL; Apply 4 g topically 4 times daily, Topical, 4 TIMES DAILY Starting Tue 1/2/2024, Disp-600 g, R-12, Normal  4. Polyarthralgia  -     diclofenac sodium (VOLTAREN) 1 % GEL; Apply 4 g topically 4 times daily, Topical, 4 TIMES DAILY Starting Tue 1/2/2024, Disp-600 g, R-12, Normal        Return in about 3 months (around 4/2/2024) for HLD.      SUBJECTIVE/OBJECTIVE:    Chief Complaint   Patient presents with    Cholesterol Problem    Discuss Labs         HPI    Laura Head is a 88 y.o. female presenting today for 3 months  follow up of hld.    Patient had labs on 12/13/23.  Labs reviewed in detail with patient     Patient does need medication refills today.      New concerns today: pt uses diclofenac gel for her multiple arthritis joints.  She is using it faster than her current prescription allows for; she needs it written differently.          Review of Systems   Constitutional: Negative.    HENT: Negative.     Respiratory: Negative.     Cardiovascular: Negative.    All other systems reviewed and are negative.      Physical Exam  Vitals and nursing note reviewed.   Constitutional:       General: She is not in acute distress.     Appearance: Normal appearance.   HENT:      Head: Normocephalic and atraumatic.      Right Ear: External ear normal.      Left Ear: External ear normal.      Nose: Nose normal.      Mouth/Throat:      Mouth: Mucous membranes are moist.   Eyes:      Extraocular Movements: Extraocular movements intact.      Conjunctiva/sclera: Conjunctivae normal.   Cardiovascular:      Rate and Rhythm: Normal rate and regular rhythm.      Heart sounds: No murmur heard.     No friction rub. No gallop.   Pulmonary:

## 2024-01-02 ENCOUNTER — OFFICE VISIT (OUTPATIENT)
Facility: CLINIC | Age: 89
End: 2024-01-02
Payer: MEDICAID

## 2024-01-02 VITALS
SYSTOLIC BLOOD PRESSURE: 124 MMHG | RESPIRATION RATE: 15 BRPM | TEMPERATURE: 97.9 F | OXYGEN SATURATION: 97 % | WEIGHT: 221 LBS | HEART RATE: 74 BPM | HEIGHT: 62 IN | DIASTOLIC BLOOD PRESSURE: 70 MMHG | BODY MASS INDEX: 40.67 KG/M2

## 2024-01-02 DIAGNOSIS — G62.9 PERIPHERAL POLYNEUROPATHY: ICD-10-CM

## 2024-01-02 DIAGNOSIS — M19.041 PRIMARY OSTEOARTHRITIS OF BOTH HANDS: ICD-10-CM

## 2024-01-02 DIAGNOSIS — M25.50 POLYARTHRALGIA: ICD-10-CM

## 2024-01-02 DIAGNOSIS — M19.042 PRIMARY OSTEOARTHRITIS OF BOTH HANDS: ICD-10-CM

## 2024-01-02 DIAGNOSIS — E78.00 PURE HYPERCHOLESTEROLEMIA, UNSPECIFIED: Primary | ICD-10-CM

## 2024-01-02 PROCEDURE — 1123F ACP DISCUSS/DSCN MKR DOCD: CPT | Performed by: FAMILY MEDICINE

## 2024-01-02 PROCEDURE — 99214 OFFICE O/P EST MOD 30 MIN: CPT | Performed by: FAMILY MEDICINE

## 2024-01-02 SDOH — ECONOMIC STABILITY: FOOD INSECURITY: WITHIN THE PAST 12 MONTHS, YOU WORRIED THAT YOUR FOOD WOULD RUN OUT BEFORE YOU GOT MONEY TO BUY MORE.: NEVER TRUE

## 2024-01-02 SDOH — ECONOMIC STABILITY: FOOD INSECURITY: WITHIN THE PAST 12 MONTHS, THE FOOD YOU BOUGHT JUST DIDN'T LAST AND YOU DIDN'T HAVE MONEY TO GET MORE.: NEVER TRUE

## 2024-01-02 SDOH — ECONOMIC STABILITY: INCOME INSECURITY: HOW HARD IS IT FOR YOU TO PAY FOR THE VERY BASICS LIKE FOOD, HOUSING, MEDICAL CARE, AND HEATING?: NOT HARD AT ALL

## 2024-01-02 ASSESSMENT — PATIENT HEALTH QUESTIONNAIRE - PHQ9
SUM OF ALL RESPONSES TO PHQ QUESTIONS 1-9: 0
2. FEELING DOWN, DEPRESSED OR HOPELESS: 0
SUM OF ALL RESPONSES TO PHQ QUESTIONS 1-9: 0
SUM OF ALL RESPONSES TO PHQ QUESTIONS 1-9: 0
SUM OF ALL RESPONSES TO PHQ9 QUESTIONS 1 & 2: 0
1. LITTLE INTEREST OR PLEASURE IN DOING THINGS: 0
SUM OF ALL RESPONSES TO PHQ QUESTIONS 1-9: 0

## 2024-01-02 NOTE — PROGRESS NOTES
Laura Head presents today for   Chief Complaint   Patient presents with    Cholesterol Problem    Discuss Labs       Is someone accompanying this pt? Daughter in law      Is the patient using any DME equipment during OV? Uses rolling walker      Depression Screenin/2/2024     1:33 PM 2023     1:40 PM 3/22/2023     1:31 PM 2022     9:11 AM 2021     4:10 PM 6/15/2021     4:02 PM   PHQ-9 Questionaire   Little interest or pleasure in doing things 0 0 0 0 0 0   Feeling down, depressed, or hopeless 0 0 0 0 0 0   PHQ-9 Total Score 0 0 0 0 0 0        ЕКАТЕРИНА 7-Anxiety        No data to display                   Fall Risk       No data to display                   Travel Screening:    Travel Screening       Question Response    Have you been in contact with someone who was sick? No / Unsure    Do you have any of the following new or worsening symptoms? None of these    Have you traveled internationally or domestically in the last month? No          Travel History   Travel since 23    No documented travel since 23          Health Maintenance reviewed and discussed and ordered per Provider.  Social Determinants of Health     Tobacco Use: Medium Risk (2024)    Patient History     Smoking Tobacco Use: Former     Smokeless Tobacco Use: Never     Passive Exposure: Not on file   Alcohol Use: Not At Risk (2023)    AUDIT-C     Frequency of Alcohol Consumption: Never     Average Number of Drinks: Not on file     Frequency of Binge Drinking: Not on file   Financial Resource Strain: Low Risk  (2024)    Overall Financial Resource Strain (CARDIA)     Difficulty of Paying Living Expenses: Not hard at all   Food Insecurity: No Food Insecurity (2024)    Hunger Vital Sign     Worried About Running Out of Food in the Last Year: Never true     Ran Out of Food in the Last Year: Never true   Transportation Needs: Unknown (2024)    PRAPARE - Transportation     Lack of Transportation (Medical):

## 2024-01-23 ENCOUNTER — TELEPHONE (OUTPATIENT)
Facility: CLINIC | Age: 89
End: 2024-01-23

## 2024-01-23 NOTE — TELEPHONE ENCOUNTER
Rama w/ [facility] called to check status on form that was needing to be signed by provider. Letter for medical necessity for incontinence supplies. I did ask Rama to refax and attn to Rianna in the event we don't have it.

## 2024-02-13 DIAGNOSIS — G62.9 PERIPHERAL POLYNEUROPATHY: ICD-10-CM

## 2024-02-13 RX ORDER — GABAPENTIN 300 MG/1
CAPSULE ORAL
Qty: 270 CAPSULE | Refills: 0 | Status: SHIPPED | OUTPATIENT
Start: 2024-02-13 | End: 2024-05-13

## 2024-02-13 NOTE — TELEPHONE ENCOUNTER
Last seen 1/2/2024   Follow up 4/3/2024   Last UDS 5/18/23  Last labs 112/13/23  Last CSA  needs at  next visit     Last filled  7/31/23

## 2024-03-13 ENCOUNTER — CLINICAL DOCUMENTATION (OUTPATIENT)
Facility: CLINIC | Age: 89
End: 2024-03-13

## 2024-03-26 ENCOUNTER — TELEPHONE (OUTPATIENT)
Facility: CLINIC | Age: 89
End: 2024-03-26

## 2024-03-26 NOTE — TELEPHONE ENCOUNTER
----- Message from Phuong Jenkins sent at 3/25/2024  8:40 AM EDT -----  Subject: Referral Request    Reason for referral request? blood work  Provider patient wants to be referred to(if known):     Provider Phone Number(if known):    Additional Information for Provider?  wanted blood before next   appointment  ---------------------------------------------------------------------------  --------------  CALL BACK INFO    [phone]; OK to leave message on voicemail  ---------------------------------------------------------------------------  --------------

## 2024-03-29 NOTE — TELEPHONE ENCOUNTER
Called x 1 on requested number below  voice mail is not set up   Left voice mail on cell and for son letting pt know she does not need any labs prior to next appointment .

## 2024-04-02 PROBLEM — M25.50 POLYARTHRALGIA: Chronic | Status: ACTIVE | Noted: 2024-01-02

## 2024-04-03 ENCOUNTER — OFFICE VISIT (OUTPATIENT)
Facility: CLINIC | Age: 89
End: 2024-04-03
Payer: MEDICAID

## 2024-04-03 VITALS
HEART RATE: 73 BPM | BODY MASS INDEX: 40.42 KG/M2 | OXYGEN SATURATION: 98 % | HEIGHT: 62 IN | SYSTOLIC BLOOD PRESSURE: 121 MMHG | TEMPERATURE: 98.1 F | DIASTOLIC BLOOD PRESSURE: 66 MMHG | RESPIRATION RATE: 15 BRPM

## 2024-04-03 DIAGNOSIS — E78.00 PURE HYPERCHOLESTEROLEMIA, UNSPECIFIED: Primary | ICD-10-CM

## 2024-04-03 DIAGNOSIS — Z51.81 MEDICATION MONITORING ENCOUNTER: ICD-10-CM

## 2024-04-03 DIAGNOSIS — G62.9 PERIPHERAL POLYNEUROPATHY: Chronic | ICD-10-CM

## 2024-04-03 DIAGNOSIS — M25.50 POLYARTHRALGIA: Chronic | ICD-10-CM

## 2024-04-03 PROCEDURE — 99214 OFFICE O/P EST MOD 30 MIN: CPT | Performed by: FAMILY MEDICINE

## 2024-04-03 PROCEDURE — 1123F ACP DISCUSS/DSCN MKR DOCD: CPT | Performed by: FAMILY MEDICINE

## 2024-04-03 SDOH — ECONOMIC STABILITY: FOOD INSECURITY: WITHIN THE PAST 12 MONTHS, THE FOOD YOU BOUGHT JUST DIDN'T LAST AND YOU DIDN'T HAVE MONEY TO GET MORE.: NEVER TRUE

## 2024-04-03 SDOH — ECONOMIC STABILITY: FOOD INSECURITY: WITHIN THE PAST 12 MONTHS, YOU WORRIED THAT YOUR FOOD WOULD RUN OUT BEFORE YOU GOT MONEY TO BUY MORE.: NEVER TRUE

## 2024-04-03 SDOH — ECONOMIC STABILITY: INCOME INSECURITY: HOW HARD IS IT FOR YOU TO PAY FOR THE VERY BASICS LIKE FOOD, HOUSING, MEDICAL CARE, AND HEATING?: NOT HARD AT ALL

## 2024-04-03 ASSESSMENT — PATIENT HEALTH QUESTIONNAIRE - PHQ9
SUM OF ALL RESPONSES TO PHQ9 QUESTIONS 1 & 2: 0
SUM OF ALL RESPONSES TO PHQ QUESTIONS 1-9: 0
SUM OF ALL RESPONSES TO PHQ QUESTIONS 1-9: 0
1. LITTLE INTEREST OR PLEASURE IN DOING THINGS: NOT AT ALL
SUM OF ALL RESPONSES TO PHQ QUESTIONS 1-9: 0
SUM OF ALL RESPONSES TO PHQ QUESTIONS 1-9: 0
2. FEELING DOWN, DEPRESSED OR HOPELESS: NOT AT ALL

## 2024-04-03 NOTE — PROGRESS NOTES
Laura Head presents today for   Chief Complaint   Patient presents with    Cholesterol Problem    polyarthralgia       Is someone accompanying this pt? Care taker    Is the patient using any DME equipment during OV? Yes wheelchair    Depression Screenin/3/2024     1:35 PM 2024     1:33 PM 2023     1:40 PM 3/22/2023     1:31 PM 2022     9:11 AM 2021     4:10 PM 6/15/2021     4:02 PM   PHQ-9 Questionaire   Little interest or pleasure in doing things 0 0 0 0 0 0 0   Feeling down, depressed, or hopeless 0 0 0 0 0 0 0   PHQ-9 Total Score 0 0 0 0 0 0 0        ЕКАТЕРИНА 7-Anxiety        No data to display                   Learning Assessment:  No question data found.     Fall Risk       No data to display                   Travel Screening:    Travel Screening       Question Response    Have you been in contact with someone who was sick? No / Unsure    Do you have any of the following new or worsening symptoms? None of these    Have you traveled internationally or domestically in the last month? No          Travel History   Travel since 24    No documented travel since 24          Health Maintenance reviewed and discussed and ordered per Provider.  Transportation Needs: Unknown (4/3/2024)    PRAPARE - Transportation     Lack of Transportation (Medical): Not on file     Lack of Transportation (Non-Medical): No      Food Insecurity: No Food Insecurity (4/3/2024)    Hunger Vital Sign     Worried About Running Out of Food in the Last Year: Never true     Ran Out of Food in the Last Year: Never true     Financial Resource Strain: Low Risk  (4/3/2024)    Overall Financial Resource Strain (CARDIA)     Difficulty of Paying Living Expenses: Not hard at all     Housing Stability: Unknown (4/3/2024)    Housing Stability Vital Sign     Unable to Pay for Housing in the Last Year: Not on file     Number of Places Lived in the Last Year: Not on file     Unstable Housing in the Last Year: No       Did 
  Musculoskeletal:         General: Normal range of motion.      Cervical back: Normal range of motion.   Skin:     General: Skin is warm and dry.   Neurological:      Mental Status: She is alert and oriented to person, place, and time.      Coordination: Coordination normal.   Psychiatric:         Mood and Affect: Mood normal.         Behavior: Behavior normal.         Thought Content: Thought content normal.         Judgment: Judgment normal.                     An electronic signature was used to authenticate this note.    --Ofelia Fernandez MD

## 2024-04-15 LAB — DRUGS UR: NORMAL

## 2024-04-22 DIAGNOSIS — R60.0 LOCALIZED EDEMA: ICD-10-CM

## 2024-04-22 DIAGNOSIS — M25.50 POLYARTHRALGIA: Primary | Chronic | ICD-10-CM

## 2024-04-22 RX ORDER — FUROSEMIDE 20 MG/1
TABLET ORAL
Qty: 90 TABLET | Refills: 0 | Status: SHIPPED | OUTPATIENT
Start: 2024-04-22

## 2024-04-22 RX ORDER — CELECOXIB 200 MG/1
CAPSULE ORAL
Qty: 30 CAPSULE | Refills: 0 | Status: SHIPPED | OUTPATIENT
Start: 2024-04-22

## 2024-04-22 NOTE — TELEPHONE ENCOUNTER
Last seen 4/3/2024   Last labs 12/13/23  Last filled  3/30/23 , 3/22/23   Next appointment 7/3/2024     Lab Results   Component Value Date     12/13/2023    K 4.3 12/13/2023     12/13/2023    CO2 24 12/13/2023    BUN 12 12/13/2023    CREATININE 0.92 12/13/2023    GLUCOSE 89 12/13/2023    CALCIUM 9.4 12/13/2023    PROT 6.5 12/13/2023    BILITOT 0.7 12/13/2023    ALKPHOS 128 (H) 12/13/2023    AST 15 12/13/2023    ALT 9 12/13/2023    LABGLOM 81 12/13/2023    GFRAA 65 10/30/2021    AGRATIO 1.6 12/13/2023    GLOB 4.0 12/27/2019

## 2024-04-25 DIAGNOSIS — E78.00 PURE HYPERCHOLESTEROLEMIA, UNSPECIFIED: ICD-10-CM

## 2024-04-26 RX ORDER — ATORVASTATIN CALCIUM 40 MG/1
40 TABLET, FILM COATED ORAL DAILY
Qty: 90 TABLET | Refills: 1 | Status: SHIPPED | OUTPATIENT
Start: 2024-04-26

## 2024-04-26 NOTE — TELEPHONE ENCOUNTER
Last seen 4/3/2024   Last labs 12/13/23  Last filled  3/22/23  Next appointment 7/3/2024     Lab Results   Component Value Date    CHOL 201 (H) 12/13/2023    TRIG 84 12/13/2023    HDL 56 12/13/2023    LDLCALC 130 (H) 12/13/2023    VLDL 15 09/27/2022         Lab Results   Component Value Date     12/13/2023    K 4.3 12/13/2023     12/13/2023    CO2 24 12/13/2023    BUN 12 12/13/2023    CREATININE 0.92 12/13/2023    GLUCOSE 89 12/13/2023    CALCIUM 9.4 12/13/2023    PROT 6.5 12/13/2023    BILITOT 0.7 12/13/2023    ALKPHOS 128 (H) 12/13/2023    AST 15 12/13/2023    ALT 9 12/13/2023    LABGLOM 81 12/13/2023    GFRAA 65 10/30/2021    AGRATIO 1.6 12/13/2023    GLOB 4.0 12/27/2019

## 2024-04-29 ENCOUNTER — OFFICE VISIT (OUTPATIENT)
Age: 89
End: 2024-04-29
Payer: MEDICAID

## 2024-04-29 VITALS — BODY MASS INDEX: 40.42 KG/M2 | HEIGHT: 62 IN

## 2024-04-29 DIAGNOSIS — G62.9 PERIPHERAL POLYNEUROPATHY: Primary | ICD-10-CM

## 2024-04-29 DIAGNOSIS — M24.541 CONTRACTURE, RIGHT HAND: ICD-10-CM

## 2024-04-29 DIAGNOSIS — R20.0 BILATERAL HAND NUMBNESS: ICD-10-CM

## 2024-04-29 PROCEDURE — 1123F ACP DISCUSS/DSCN MKR DOCD: CPT | Performed by: ORTHOPAEDIC SURGERY

## 2024-04-29 PROCEDURE — 99212 OFFICE O/P EST SF 10 MIN: CPT | Performed by: ORTHOPAEDIC SURGERY

## 2024-04-29 NOTE — PROGRESS NOTES
significant for degenerative changes throughout the small joints.  No acute abnormalities noted.      Impression     Diagnosis Orders   1. Peripheral polyneuropathy  BS - [facility] - [facility], [place]      2. Contracture, right hand  BS - [facility] - [facility], [place]      3. Bilateral hand numbness  DME Order for (Specify) as OP            Plan:     New order placed for occupational therapy today.    Bilateral nighttime braces supplied today.    Again discussed at length the importance of retaining the motion she has with therapy.    Return if symptoms worsen or fail to improve.     Plan was reviewed with patient, who verbalized agreement and understanding of the plan    Note: This note was completed using voice recognition software.  Any typographical/name errors or mistakes are unintentional.

## 2024-05-09 ENCOUNTER — TELEPHONE (OUTPATIENT)
Facility: CLINIC | Age: 89
End: 2024-05-09

## 2024-05-09 NOTE — TELEPHONE ENCOUNTER
Pt daughter called in to get a rollator order for pt she broke the old one and the daughter wants one order so they don't have to pay out of pocket

## 2024-05-09 NOTE — TELEPHONE ENCOUNTER
Patient daughter Ms. Monsivais is requesting DME order for new rollator. She states that her mother rollator wheel fell off. The rollator patient currently has was given to her so there is no DME attached to it, so she is requesting an order for a new one.     Dx Code can be  difficulty walking, Muscle weakness, and Impaired mobility.

## 2024-05-20 ENCOUNTER — OFFICE VISIT (OUTPATIENT)
Facility: CLINIC | Age: 89
End: 2024-05-20
Payer: MEDICAID

## 2024-05-20 VITALS
BODY MASS INDEX: 40.67 KG/M2 | RESPIRATION RATE: 13 BRPM | TEMPERATURE: 97.9 F | HEIGHT: 62 IN | OXYGEN SATURATION: 100 % | HEART RATE: 63 BPM | WEIGHT: 221 LBS | SYSTOLIC BLOOD PRESSURE: 107 MMHG | DIASTOLIC BLOOD PRESSURE: 69 MMHG

## 2024-05-20 DIAGNOSIS — Z78.9 IMPAIRED MOBILITY AND ADLS: ICD-10-CM

## 2024-05-20 DIAGNOSIS — M70.72 BURSITIS OF BOTH HIPS, UNSPECIFIED BURSA: ICD-10-CM

## 2024-05-20 DIAGNOSIS — G62.9 PERIPHERAL POLYNEUROPATHY: ICD-10-CM

## 2024-05-20 DIAGNOSIS — Z74.09 IMPAIRED MOBILITY AND ADLS: ICD-10-CM

## 2024-05-20 DIAGNOSIS — M25.552 PAIN IN LEFT HIP: ICD-10-CM

## 2024-05-20 DIAGNOSIS — M25.551 PAIN IN RIGHT HIP: ICD-10-CM

## 2024-05-20 DIAGNOSIS — M25.50 POLYARTHRALGIA: Primary | ICD-10-CM

## 2024-05-20 DIAGNOSIS — M70.71 BURSITIS OF BOTH HIPS, UNSPECIFIED BURSA: ICD-10-CM

## 2024-05-20 DIAGNOSIS — R26.2 DIFFICULTY IN WALKING, NOT ELSEWHERE CLASSIFIED: ICD-10-CM

## 2024-05-20 PROCEDURE — 1123F ACP DISCUSS/DSCN MKR DOCD: CPT | Performed by: FAMILY MEDICINE

## 2024-05-20 PROCEDURE — 99213 OFFICE O/P EST LOW 20 MIN: CPT | Performed by: FAMILY MEDICINE

## 2024-05-20 SDOH — ECONOMIC STABILITY: FOOD INSECURITY: WITHIN THE PAST 12 MONTHS, YOU WORRIED THAT YOUR FOOD WOULD RUN OUT BEFORE YOU GOT MONEY TO BUY MORE.: NEVER TRUE

## 2024-05-20 SDOH — ECONOMIC STABILITY: FOOD INSECURITY: WITHIN THE PAST 12 MONTHS, THE FOOD YOU BOUGHT JUST DIDN'T LAST AND YOU DIDN'T HAVE MONEY TO GET MORE.: NEVER TRUE

## 2024-05-20 SDOH — ECONOMIC STABILITY: INCOME INSECURITY: HOW HARD IS IT FOR YOU TO PAY FOR THE VERY BASICS LIKE FOOD, HOUSING, MEDICAL CARE, AND HEATING?: NOT HARD AT ALL

## 2024-05-20 ASSESSMENT — PATIENT HEALTH QUESTIONNAIRE - PHQ9
SUM OF ALL RESPONSES TO PHQ QUESTIONS 1-9: 0
SUM OF ALL RESPONSES TO PHQ9 QUESTIONS 1 & 2: 0
2. FEELING DOWN, DEPRESSED OR HOPELESS: NOT AT ALL
SUM OF ALL RESPONSES TO PHQ QUESTIONS 1-9: 0
1. LITTLE INTEREST OR PLEASURE IN DOING THINGS: NOT AT ALL

## 2024-05-20 NOTE — PROGRESS NOTES
Laura Head presents today for   Chief Complaint   Patient presents with    DME     Patient need updated office visit for DME chair order.        Is someone accompanying this pt? Yes rory     Is the patient using any DME equipment during OV? Yes patient uses     Depression Screenin/20/2024     1:07 PM 4/3/2024     1:35 PM 2024     1:33 PM 2023     1:40 PM 3/22/2023     1:31 PM 2022     9:11 AM 2021     4:10 PM   PHQ-9 Questionaire   Little interest or pleasure in doing things 0 0 0 0 0 0 0   Feeling down, depressed, or hopeless 0 0 0 0 0 0 0   PHQ-9 Total Score 0 0 0 0 0 0 0        ЕКАТРЕИНА 7-Anxiety        No data to display                 Travel Screening:    Travel Screening       Question Response    Have you been in contact with someone who was sick? No / Unsure    Do you have any of the following new or worsening symptoms? None of these    Have you traveled internationally or domestically in the last month? No          Travel History   Travel since 24    No documented travel since 24          Health Maintenance reviewed and discussed and ordered per Provider.  Transportation Needs: Unknown (2024)    PRAPARE - Transportation     Lack of Transportation (Medical): Not on file     Lack of Transportation (Non-Medical): No      Food Insecurity: No Food Insecurity (2024)    Hunger Vital Sign     Worried About Running Out of Food in the Last Year: Never true     Ran Out of Food in the Last Year: Never true     Financial Resource Strain: Low Risk  (2024)    Overall Financial Resource Strain (CARDIA)     Difficulty of Paying Living Expenses: Not hard at all     Housing Stability: Unknown (2024)    Housing Stability Vital Sign     Unable to Pay for Housing in the Last Year: Not on file     Number of Places Lived in the Last Year: Not on file     Unstable Housing in the Last Year: No       Did you provide resources if patient requested them? Yes patient declined

## 2024-05-20 NOTE — PROGRESS NOTES
Laura Head (:  1935) is a 88 y.o. female,Established patient, here for evaluation of the following chief complaint(s):  DME (Patient need updated office visit for DME chair order. )      Assessment & Plan   1. Polyarthralgia  -     Misc. Devices (BARIATRIC ROLLATOR) MISC; Disp-1 each, R-0, PrintUse when ambulating.  2. Peripheral polyneuropathy  -     Misc. Devices (BARIATRIC ROLLATOR) MISC; Disp-1 each, R-0, PrintUse when ambulating.  3. Bursitis of both hips, unspecified bursa  -     Misc. Devices (BARIATRIC ROLLATOR) MISC; Disp-1 each, R-0, PrintUse when ambulating.  4. Pain in right hip  -     Misc. Devices (BARIATRIC ROLLATOR) MISC; Disp-1 each, R-0, PrintUse when ambulating.  5. Pain in left hip  -     Misc. Devices (BARIATRIC ROLLATOR) MISC; Disp-1 each, R-0, PrintUse when ambulating.  6. Impaired mobility and ADLs  -     Misc. Devices (BARIATRIC ROLLATOR) MISC; Disp-1 each, R-0, PrintUse when ambulating.  7. Difficulty in walking, not elsewhere classified  -     Misc. Devices (BARIATRIC ROLLATOR) MISC; Disp-1 each, R-0, PrintUse when ambulating.      Return for routine care.       Subjective   HPI  Pt presents for eval for rollator walker.   Pt has been using a rollator for many years. She had used a cane previously and a traditional walker as well.  Pt is more easily able to navigate with the rollator and can sit to rest as needed.  Pt's last rollator is now broken.  It caused pt to fall.  She did injure herself in the fall.  Pt does not feel steady when walking alone.  She never walks or transfers without assistance.  Pt will need an extra wide rollator due to her size.        Review of Systems   Constitutional: Negative.    HENT: Negative.     Respiratory: Negative.     Cardiovascular: Negative.    Musculoskeletal:  Positive for arthralgias and gait problem.   All other systems reviewed and are negative.         Objective   Physical Exam  Vitals and nursing note reviewed.   Constitutional:

## 2024-05-21 ENCOUNTER — HOSPITAL ENCOUNTER (OUTPATIENT)
Facility: HOSPITAL | Age: 89
Setting detail: RECURRING SERIES
Discharge: HOME | End: 2024-05-24
Payer: MEDICAID

## 2024-05-21 PROCEDURE — 97110 THERAPEUTIC EXERCISES: CPT

## 2024-05-21 PROCEDURE — 97165 OT EVAL LOW COMPLEX 30 MIN: CPT

## 2024-05-21 PROCEDURE — 97535 SELF CARE MNGMENT TRAINING: CPT

## 2024-05-21 NOTE — PROGRESS NOTES
[facility]   Plan of Care/Statement of Necessity for Occupational Therapy Services        Patient name: Laura Head Start of Care: 2024   Referral source: Timothy Christopher DO : 1935    Medical Diagnosis: Pain in right hand    Onset Date:2020    Treatment Diagnosis: Pain in right hand [M79.641]                                                 Prior Hospitalization: see medical history Provider#: [id number]   Medications: Verified on Patient Summary List   Insurance: Payor: [facility] MEDICAID / Plan: [facility] HEALTHKEEPERS PLUS / Product Type: *No Product type* /       Comorbidities: Arthritis, severe polyneuropathy   Prior Level of Function: Pt has had right hand PIP contracture for the past 4 years per report, has had progressive numbness/tingling in the hands for years as well. Pt is grossly mod A with self care tasks, uses right hand to feed but left hand for the rest of fine and gross motor tasks. Pt is self reported right handed.     The Plan of Care and following information is based on the information from the initial evaluation.  Assessment/ key information: Pt presents with bilateral hand pain, weakness, decreased fine motor skills, numbness/tingling through finger tips leading to decreased function. 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 Pt is electing for conservative treatment at this time, was educated that unless surgical intervention was performed, tingling/numbness will most likely persist, however therapy would be helpful to help improve fine motor skills, strength, decrease pain levels and improve use of the hands and improve quality of life. Pt educated on exercises to perform at home to help strengthen the hand. Pt would benefit from thumb spacer/opponens splint in the future to assist with fine motor activities for both hands.      Patient will continue to 
Therapeutic Exercise (timed):  increase ROM, strength, coordination, balance, and proprioception to improve patient's ability to progress to PLOF and address remaining functional goals. (see flow sheet as applicable)     Details if applicable:     10  52722 Self Care/Home Management (timed):  improve patient knowledge and understanding of pain reducing techniques, positioning, posture/ergonomics, and home safety  to improve patient's ability to progress to PLOF and address remaining functional goals.  (see flow sheet as applicable)     Details if applicable:                    25  [facility] Totals Reminder: bill using total billable min of TIMED therapeutic procedures (example: do not include dry needle or estim unattended, both untimed codes, in totals to left)  8-22 min = 1 unit; 23-37 min = 2 units; 38-52 min = 3 units; 53-67 min = 4 units; 68-82 min = 5 units   Total Total     [x]  Patient Education billed concurrently with other procedures   [x] Review HEP            General Evaluation                                                              Wrist AROM       Right           Left                                                         Ext                      40                  25                                                                                             Sup                      15                   15                                             Finger ROM; Pt with right PIP contractures on the right PIP joints, pain with attempted extension, firm end feel in index finger and middle, soft but painful end feel on ring and small finger.            Right                  Left  1st                     5                                 15                                                          2nd                    5                                  15                           3rd               5                      15                     Pinch     Right          Left

## 2024-05-27 ASSESSMENT — ENCOUNTER SYMPTOMS: RESPIRATORY NEGATIVE: 1

## 2024-06-05 ENCOUNTER — HOSPITAL ENCOUNTER (OUTPATIENT)
Facility: HOSPITAL | Age: 89
Setting detail: RECURRING SERIES
Discharge: HOME | End: 2024-06-08
Payer: MEDICAID

## 2024-06-05 PROCEDURE — 97760 ORTHOTIC MGMT&TRAING 1ST ENC: CPT

## 2024-06-05 PROCEDURE — 97112 NEUROMUSCULAR REEDUCATION: CPT

## 2024-06-05 PROCEDURE — 97110 THERAPEUTIC EXERCISES: CPT

## 2024-06-05 NOTE — PROGRESS NOTES
PHYSICAL / OCCUPATIONAL THERAPY - DAILY TREATMENT NOTE     Patient Name: Laura Head    Date: 2024    : 1935  Insurance: Payor: [facility] MEDICAID / Plan: [facility] HEALTHKEEPERS PLUS / Product Type: *No Product type* /      Patient  verified Yes     Visit #   Current / Total 2 8   Time   In / Out 1020 1102   Pain   In / Out 5 5   Subjective Functional Status/Changes: \"My should\" ers are sore'   Changes to:  Allergies, Med Hx, Sx Hx?   no       TREATMENT AREA =  Pain in right hand [M79.641]    OBJECTIVE        Therapeutic Procedures:  Tx Min Billable or 1:1 Min (if diff from Tx Min) Procedure, Rationale, Specifics   15  28676 Therapeutic Exercise (timed):  increase ROM, strength, coordination, balance, and proprioception to improve patient's ability to progress to PLOF and address remaining functional goals. (see flow sheet as applicable)    Details if applicable:       10  15804 Neuromuscular Re-Education (timed):  improve balance, coordination, kinesthetic sense, posture, core stability and proprioception to improve patient's ability to develop conscious control of individual muscles and awareness of position of extremities in order to progress to PLOF and address remaining functional goals. (see flow sheet as applicable)    Details if applicable:     15  87063 Orthotic Management and Training (timed): improve positioning of lower extremity during weight bearing and gait, improve pressure distrubution of the plantar aspect of the foot to improve patient's ability to progress to PLOF and address remaining functional goals.     Details if applicable:               42  [facility] Totals Reminder: bill using total billable min of TIMED therapeutic procedures (example: do not include dry needle or estim unattended, both untimed codes, in totals to left)  8-22 min = 1 unit; 23-37 min = 2 units; 38-52 min = 3 units; 53-67 min = 4 units; 68-82 min = 5 units   Total Total     TOTAL TREATMENT TIME:

## 2024-06-17 DIAGNOSIS — M25.50 POLYARTHRALGIA: Chronic | ICD-10-CM

## 2024-06-18 NOTE — TELEPHONE ENCOUNTER
Last seen 5/20/24  Last labs 12/13/23  Last filled  4/22/24  Next appointment 7/3/24    Lab Results   Component Value Date     12/13/2023    K 4.3 12/13/2023     12/13/2023    CO2 24 12/13/2023    BUN 12 12/13/2023    CREATININE 0.92 12/13/2023    GLUCOSE 89 12/13/2023    CALCIUM 9.4 12/13/2023    BILITOT 0.7 12/13/2023    ALKPHOS 128 (H) 12/13/2023    AST 15 12/13/2023    ALT 9 12/13/2023    LABGLOM 81 12/13/2023    GFRAA 65 10/30/2021    AGRATIO 1.6 12/13/2023    GLOB 4.0 12/27/2019

## 2024-06-19 RX ORDER — CELECOXIB 200 MG/1
CAPSULE ORAL
Qty: 90 CAPSULE | Refills: 4 | Status: SHIPPED | OUTPATIENT
Start: 2024-06-19

## 2024-06-24 ENCOUNTER — HOSPITAL ENCOUNTER (OUTPATIENT)
Facility: HOSPITAL | Age: 89
Discharge: HOME | End: 2024-06-27

## 2024-06-24 LAB — LABCORP SPECIMEN COLLECTION: NORMAL

## 2024-06-24 PROCEDURE — 99001 SPECIMEN HANDLING PT-LAB: CPT

## 2024-06-25 LAB
ALBUMIN SERPL-MCNC: 3.8 G/DL (ref 3.7–4.7)
ALP SERPL-CCNC: 135 IU/L (ref 44–121)
ALT SERPL-CCNC: 10 IU/L (ref 0–32)
AST SERPL-CCNC: 16 IU/L (ref 0–40)
BILIRUB SERPL-MCNC: 0.5 MG/DL (ref 0–1.2)
BUN SERPL-MCNC: 9 MG/DL (ref 8–27)
BUN/CREAT SERPL: 10 (ref 12–28)
CALCIUM SERPL-MCNC: 9.4 MG/DL (ref 8.7–10.3)
CHLORIDE SERPL-SCNC: 106 MMOL/L (ref 96–106)
CHOLEST SERPL-MCNC: 189 MG/DL (ref 100–199)
CO2 SERPL-SCNC: 24 MMOL/L (ref 20–29)
CREAT SERPL-MCNC: 0.86 MG/DL (ref 0.57–1)
EGFRCR SERPLBLD CKD-EPI 2021: 66 ML/MIN/1.73
GLOBULIN SER CALC-MCNC: 2.2 G/DL (ref 1.5–4.5)
GLUCOSE SERPL-MCNC: 98 MG/DL (ref 70–99)
HDLC SERPL-MCNC: 57 MG/DL
LDLC SERPL CALC-MCNC: 117 MG/DL (ref 0–99)
POTASSIUM SERPL-SCNC: 4.1 MMOL/L (ref 3.5–5.2)
PROT SERPL-MCNC: 6 G/DL (ref 6–8.5)
SODIUM SERPL-SCNC: 144 MMOL/L (ref 134–144)
TRIGL SERPL-MCNC: 82 MG/DL (ref 0–149)
VLDLC SERPL CALC-MCNC: 15 MG/DL (ref 5–40)

## 2024-07-01 ENCOUNTER — HOSPITAL ENCOUNTER (OUTPATIENT)
Facility: HOSPITAL | Age: 89
Setting detail: RECURRING SERIES
Discharge: HOME | End: 2024-07-04
Payer: MEDICAID

## 2024-07-01 PROCEDURE — 97110 THERAPEUTIC EXERCISES: CPT

## 2024-07-01 PROCEDURE — 97530 THERAPEUTIC ACTIVITIES: CPT

## 2024-07-01 PROCEDURE — 97760 ORTHOTIC MGMT&TRAING 1ST ENC: CPT

## 2024-07-01 NOTE — PROGRESS NOTES
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 Pt is grossly mod A with self care tasks, uses right hand to feed but left hand for the rest of fine and gross motor tasks. Pt is self reported right handed.     Visits from Start of Care: 3    Missed Visits: 1    Goals/Measure of Progress:    ST Weeks   Pt will be able to demonstrate HEP with visual cues only to improve knowledge and carry over of exercises and improve overall outcome     Eval status: given handout upon evaluation and educated   24:  Pt requires verbal cues and instruction, is able to don/splint with independence, goal progressing  2. Pt will be able to don/doff orthosis bilateral hands with min A and caregiver assist to lead to improved fine motor control and performance   Eval status. Will make upon future appointments   24: Able don/doff left, unable to perform with right, progressing   3. Pt will be able to improve bilateral  strength to over 25# to improve functional  strength   Eval status: Right:5#   Left 15  24:  Right: 5#  Left: 10# no progress      LT weeks   Pt will be able to complete 9 hole pegs test with splint donned to improve fine motor performance   Eval status: unable to complete at all with no splint   24: Pt able to pick 1 peg with the left hand. With on towel, pt able to  all pegs with 
Patient feeling better  Dr Tenorio spoke with patient  Discharge noted  
concurrently with other procedures   [x] Review HEP        Objective Information/Functional Measures/Assessment    Pt has been through course of 3 OT visits to address bilateral hand deficits, weakness due to severe polyneuropathy. Pt has been fabricated custom thumb spacer orthosis to prevent adduction and lateral pinch patterns in the thumb allowing for opposition of the index finger and thumb. Pt is able to gras pegs off a soft surface and place into holes, and is educated on how this translates to her medication management, as well as handling fine motor tasks such as brushing teeth, holding pen/pencil with build up handle. Pt with no improvement in  strength but did demonstrate small improvement in 2pt pinch. Recommend continue to work with OT to improve functional of hands and improve quality of life.     Patient will continue to benefit from skilled PT / OT services to modify and progress therapeutic interventions, analyze and address functional mobility deficits, analyze and address ROM deficits, analyze and address strength deficits, analyze and address soft tissue restrictions, analyze and cue for proper movement patterns, analyze and modify for postural abnormalities, and instruct in home and community integration to address functional deficits and attain remaining goals.    Progress toward goals / Updated goals:  []  See Progress Note/Recertification    ST Weeks   Pt will be able to demonstrate HEP with visual cues only to improve knowledge and carry over of exercises and improve overall outcome     Eval status: given handout upon evaluation and educated   24:  Pt requires verbal cues and instruction, is able to don/splint with independence, goal progressing  2. Pt will be able to don/doff orthosis bilateral hands with min A and caregiver assist to lead to improved fine motor control and performance   Eval status. Will make upon future appointments   24: Able don/doff left, unable to

## 2024-07-02 DIAGNOSIS — E78.00 PURE HYPERCHOLESTEROLEMIA, UNSPECIFIED: ICD-10-CM

## 2024-07-02 ASSESSMENT — ENCOUNTER SYMPTOMS: RESPIRATORY NEGATIVE: 1

## 2024-07-03 ENCOUNTER — OFFICE VISIT (OUTPATIENT)
Facility: CLINIC | Age: 89
End: 2024-07-03
Payer: MEDICAID

## 2024-07-03 VITALS
OXYGEN SATURATION: 96 % | SYSTOLIC BLOOD PRESSURE: 111 MMHG | DIASTOLIC BLOOD PRESSURE: 62 MMHG | WEIGHT: 229 LBS | TEMPERATURE: 97.7 F | RESPIRATION RATE: 13 BRPM | HEART RATE: 70 BPM | HEIGHT: 62 IN | BODY MASS INDEX: 42.14 KG/M2

## 2024-07-03 DIAGNOSIS — E78.00 PURE HYPERCHOLESTEROLEMIA, UNSPECIFIED: ICD-10-CM

## 2024-07-03 DIAGNOSIS — M25.50 POLYARTHRALGIA: Chronic | ICD-10-CM

## 2024-07-03 DIAGNOSIS — G62.9 PERIPHERAL POLYNEUROPATHY: Primary | ICD-10-CM

## 2024-07-03 PROCEDURE — 1123F ACP DISCUSS/DSCN MKR DOCD: CPT | Performed by: FAMILY MEDICINE

## 2024-07-03 PROCEDURE — 99214 OFFICE O/P EST MOD 30 MIN: CPT | Performed by: FAMILY MEDICINE

## 2024-07-03 RX ORDER — CELECOXIB 200 MG/1
200 CAPSULE ORAL DAILY
Qty: 90 CAPSULE | Refills: 4 | Status: SHIPPED | OUTPATIENT
Start: 2024-07-03

## 2024-07-03 SDOH — ECONOMIC STABILITY: FOOD INSECURITY: WITHIN THE PAST 12 MONTHS, YOU WORRIED THAT YOUR FOOD WOULD RUN OUT BEFORE YOU GOT MONEY TO BUY MORE.: NEVER TRUE

## 2024-07-03 SDOH — ECONOMIC STABILITY: FOOD INSECURITY: WITHIN THE PAST 12 MONTHS, THE FOOD YOU BOUGHT JUST DIDN'T LAST AND YOU DIDN'T HAVE MONEY TO GET MORE.: NEVER TRUE

## 2024-07-03 SDOH — ECONOMIC STABILITY: INCOME INSECURITY: HOW HARD IS IT FOR YOU TO PAY FOR THE VERY BASICS LIKE FOOD, HOUSING, MEDICAL CARE, AND HEATING?: NOT HARD AT ALL

## 2024-07-03 ASSESSMENT — PATIENT HEALTH QUESTIONNAIRE - PHQ9
1. LITTLE INTEREST OR PLEASURE IN DOING THINGS: NOT AT ALL
SUM OF ALL RESPONSES TO PHQ9 QUESTIONS 1 & 2: 0
SUM OF ALL RESPONSES TO PHQ QUESTIONS 1-9: 0
2. FEELING DOWN, DEPRESSED OR HOPELESS: NOT AT ALL
SUM OF ALL RESPONSES TO PHQ QUESTIONS 1-9: 0

## 2024-07-03 NOTE — PROGRESS NOTES
Laura Head presents today for   Chief Complaint   Patient presents with     Polyarthralgia    Peripheral polyneuropathy    Bursitis    Hip Pain     Impaired mobility and ADLs    Difficulty in walking       Is someone accompanying this pt? Yes, Donna Daughter in  law    Is the patient using any DME equipment during OV? Wheelchair    Depression Screenin/3/2024    11:13 AM 2024     1:07 PM 4/3/2024     1:35 PM 2024     1:33 PM 2023     1:40 PM 3/22/2023     1:31 PM 2022     9:11 AM   PHQ-9 Questionaire   Little interest or pleasure in doing things 0 0 0 0 0 0 0   Feeling down, depressed, or hopeless 0 0 0 0 0 0 0   PHQ-9 Total Score 0 0 0 0 0 0 0        ЕКАТЕРИНА 7-Anxiety        No data to display                   Learning Assessment:  No question data found.     Fall Risk       No data to display                   Travel Screening:    Travel Screening     No screening recorded since 24 0000       Travel History   Travel since 24    No documented travel since 24            Health Maintenance reviewed and discussed and ordered per Provider.  Transportation Needs: Unknown (7/3/2024)    PRAPARE - Transportation     Lack of Transportation (Medical): Not on file     Lack of Transportation (Non-Medical): No      Food Insecurity: No Food Insecurity (7/3/2024)    Hunger Vital Sign     Worried About Running Out of Food in the Last Year: Never true     Ran Out of Food in the Last Year: Never true     Financial Resource Strain: Low Risk  (7/3/2024)    Overall Financial Resource Strain (CARDIA)     Difficulty of Paying Living Expenses: Not hard at all     Housing Stability: Unknown (7/3/2024)    Housing Stability Vital Sign     Unable to Pay for Housing in the Last Year: Not on file     Number of Places Lived in the Last Year: Not on file     Unstable Housing in the Last Year: No       Did you provide resources if patient requested them? =no      Health Maintenance Due   Topic Date Due

## 2024-07-03 NOTE — PROGRESS NOTES
ASSESSMENT/PLAN:  1. Peripheral polyneuropathy  Assessment & Plan:   At goal, continue current medications  2. Polyarthralgia  -     celecoxib (CELEBREX) 200 MG capsule; Take 1 capsule by mouth daily, Disp-90 capsule, R-4Normal  3. Pure hypercholesterolemia, unspecified  Assessment & Plan:   Well-controlled, continue current medications        Return in about 4 months (around 11/3/2024) for HLD, peripheral neuropathy.      SUBJECTIVE/OBJECTIVE:    Chief Complaint   Patient presents with     Polyarthralgia    Peripheral polyneuropathy    Cholesterol Problem         HPI    Laura Head is a 88 y.o. female presenting today for    3 months  follow up of hld, pn.  Pt has been doing well overall.      Pt has received her rollator walker and is using it at home with no issues.      Patient had labs on 6/24/24.  Labs reviewed in detail with patient       Patient does need medication refills today.      New concerns today: none        Review of Systems   Constitutional: Negative.    HENT: Negative.     Respiratory: Negative.     Cardiovascular: Negative.    All other systems reviewed and are negative.      Physical Exam  Vitals and nursing note reviewed.   Constitutional:       General: She is not in acute distress.     Appearance: Normal appearance.   HENT:      Head: Normocephalic and atraumatic.      Right Ear: External ear normal.      Left Ear: External ear normal.      Nose: Nose normal.      Mouth/Throat:      Mouth: Mucous membranes are moist.   Eyes:      Extraocular Movements: Extraocular movements intact.      Conjunctiva/sclera: Conjunctivae normal.   Cardiovascular:      Rate and Rhythm: Normal rate and regular rhythm.      Heart sounds: No murmur heard.     No friction rub. No gallop.   Pulmonary:      Effort: Pulmonary effort is normal.      Breath sounds: Normal breath sounds. No wheezing, rhonchi or rales.   Musculoskeletal:         General: Normal range of motion.      Cervical back: Normal range of motion.

## 2024-07-10 ENCOUNTER — HOSPITAL ENCOUNTER (OUTPATIENT)
Facility: HOSPITAL | Age: 89
Setting detail: RECURRING SERIES
Discharge: HOME | End: 2024-07-13
Payer: MEDICAID

## 2024-07-10 PROCEDURE — 97110 THERAPEUTIC EXERCISES: CPT

## 2024-07-10 PROCEDURE — 97140 MANUAL THERAPY 1/> REGIONS: CPT

## 2024-07-10 PROCEDURE — 97530 THERAPEUTIC ACTIVITIES: CPT

## 2024-07-10 NOTE — PROGRESS NOTES
PHYSICAL / OCCUPATIONAL THERAPY - DAILY TREATMENT NOTE     Patient Name: Laura Head    Date: 7/10/2024    : 1935  Insurance: Payor: [facility] MEDICAID / Plan: ANEESH [facility] HEALTHKEEPERS PLUS / Product Type: *No Product type* /      Patient  verified Yes     Visit #   Current / Total 4 8   Time   In / Out 1010 1103   Pain   In / Out 0 0   Subjective Functional Status/Changes: \"I dont know\"    Changes to:  Allergies, Med Hx, Sx Hx?   no       TREATMENT AREA =  Pain in right hand [M79.641]    OBJECTIVE        Therapeutic Procedures:  Tx Min Billable or 1:1 Min (if diff from Tx Min) Procedure, Rationale, Specifics   18  50537 Therapeutic Exercise (timed):  increase ROM, strength, coordination, balance, and proprioception to improve patient's ability to progress to PLOF and address remaining functional goals. (see flow sheet as applicable)    Details if applicable:       25  69213 Therapeutic Activity (timed):  use of dynamic activities replicating functional movements to increase ROM, strength, coordination, balance, and proprioception in order to improve patient's ability to progress to PLOF and address remaining functional goals.  (see flow sheet as applicable)    Details if applicable:     10  33821 Manual Therapy (timed):  increase ROM and increase tissue extensibility to improve patient's ability to progress to PLOF and address remaining functional goals.  The manual therapy interventions were performed at a separate and distinct time from the therapeutic activities interventions . Details: IASTM through volar hand, right hand, along a1 pulley distribution to decrease sotf tissue in fingers prior to activity      Details if applicable:               53  [facility] Totals Reminder: bill using total billable min of TIMED therapeutic procedures (example: do not include dry needle or estim unattended, both untimed codes, in totals to left)  8-22 min = 1 unit; 23-37 min = 2 units; 38-52 min = 3 units; 53-67

## 2024-07-16 ENCOUNTER — TELEPHONE (OUTPATIENT)
Facility: HOSPITAL | Age: 89
End: 2024-07-16

## 2024-07-16 ENCOUNTER — APPOINTMENT (OUTPATIENT)
Facility: HOSPITAL | Age: 89
End: 2024-07-16
Payer: MEDICAID

## 2024-07-16 NOTE — TELEPHONE ENCOUNTER
SPW pt's daughter who called to confirm today's appt. I informed her that the auth has not yet been approved and confirmed the appt for next week on 7.23.24. Daughter stated she will call back on Friday to check if the auth has been approved.

## 2024-07-17 DIAGNOSIS — J30.9 ALLERGIC RHINITIS, UNSPECIFIED SEASONALITY, UNSPECIFIED TRIGGER: ICD-10-CM

## 2024-07-17 RX ORDER — CETIRIZINE HYDROCHLORIDE 10 MG/1
TABLET ORAL
Qty: 30 TABLET | Refills: 0 | Status: SHIPPED | OUTPATIENT
Start: 2024-07-17

## 2024-07-17 NOTE — TELEPHONE ENCOUNTER
Last seen 7/3/2024   Last labs   Last filled  6/23/23  Next appointment 11/4/2024     Lab Results   Component Value Date     06/24/2024    K 4.1 06/24/2024     06/24/2024    CO2 24 06/24/2024    BUN 9 06/24/2024    CREATININE 0.86 06/24/2024    GLUCOSE 98 06/24/2024    CALCIUM 9.4 06/24/2024    BILITOT 0.5 06/24/2024    ALKPHOS 135 (H) 06/24/2024    AST 16 06/24/2024    ALT 10 06/24/2024    LABGLOM 66 06/24/2024    GFRAA 65 10/30/2021    AGRATIO 1.6 12/13/2023    GLOB 4.0 12/27/2019

## 2024-07-18 DIAGNOSIS — J30.9 ALLERGIC RHINITIS, UNSPECIFIED SEASONALITY, UNSPECIFIED TRIGGER: ICD-10-CM

## 2024-07-18 RX ORDER — FLUTICASONE PROPIONATE 50 MCG
SPRAY, SUSPENSION (ML) NASAL
Qty: 16 G | Refills: 0 | Status: SHIPPED | OUTPATIENT
Start: 2024-07-18

## 2024-07-23 ENCOUNTER — APPOINTMENT (OUTPATIENT)
Facility: HOSPITAL | Age: 89
End: 2024-07-23
Payer: MEDICAID

## 2024-07-31 ENCOUNTER — TELEPHONE (OUTPATIENT)
Facility: HOSPITAL | Age: 89
End: 2024-07-31

## 2024-07-31 NOTE — TELEPHONE ENCOUNTER
SPW pt who asked me to speak with sue in law re cancelling 8.1 and 8.5 appointments due to insurance denial. Pt and family understood. Will call pt once approved.

## 2024-08-01 ENCOUNTER — APPOINTMENT (OUTPATIENT)
Facility: HOSPITAL | Age: 89
End: 2024-08-01
Payer: MEDICAID

## 2024-08-05 ENCOUNTER — HOSPITAL ENCOUNTER (OUTPATIENT)
Facility: HOSPITAL | Age: 89
Setting detail: RECURRING SERIES
End: 2024-08-05
Payer: MEDICAID

## 2024-08-06 ENCOUNTER — APPOINTMENT (OUTPATIENT)
Facility: HOSPITAL | Age: 89
End: 2024-08-06
Payer: MEDICAID

## 2024-08-08 DIAGNOSIS — R60.0 LOCALIZED EDEMA: ICD-10-CM

## 2024-08-09 RX ORDER — FUROSEMIDE 20 MG/1
TABLET ORAL
Qty: 90 TABLET | Refills: 3 | Status: SHIPPED | OUTPATIENT
Start: 2024-08-09

## 2024-08-09 NOTE — TELEPHONE ENCOUNTER
Last seen Visit 7/3/24  Last labs 6/24/24  Last filled 4/22/24  Next appointment 11/24/24    Lab Results   Component Value Date     06/24/2024    K 4.1 06/24/2024     06/24/2024    CO2 24 06/24/2024    BUN 9 06/24/2024    CREATININE 0.86 06/24/2024    GLUCOSE 98 06/24/2024    CALCIUM 9.4 06/24/2024    BILITOT 0.5 06/24/2024    ALKPHOS 135 (H) 06/24/2024    AST 16 06/24/2024    ALT 10 06/24/2024    LABGLOM 66 06/24/2024    GFRAA 65 10/30/2021    AGRATIO 1.6 12/13/2023    GLOB 4.0 12/27/2019

## 2024-08-20 ENCOUNTER — HOSPITAL ENCOUNTER (OUTPATIENT)
Facility: HOSPITAL | Age: 89
Setting detail: RECURRING SERIES
Discharge: HOME | End: 2024-08-23
Payer: MEDICAID

## 2024-08-20 DIAGNOSIS — J30.9 ALLERGIC RHINITIS, UNSPECIFIED SEASONALITY, UNSPECIFIED TRIGGER: ICD-10-CM

## 2024-08-20 PROCEDURE — 97530 THERAPEUTIC ACTIVITIES: CPT

## 2024-08-20 PROCEDURE — 97110 THERAPEUTIC EXERCISES: CPT

## 2024-08-20 NOTE — PROGRESS NOTES
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 Pt is grossly mod A with self care tasks, uses right hand to feed but left hand for the rest of fine and gross motor tasks. Pt is self reported right handed.     Visits from Start of Care: 5    Missed Visits: 0    Goals/Measure of Progress:    ST Weeks   Pt will be able to demonstrate HEP with visual cues only to improve knowledge and carry over of exercises and improve overall outcome     Eval status: given handout upon evaluation and educated   24:  Pt requires verbal cues and instruction, is able to don/splint with independence, goal progressing  24: requires visual and verbal cuing      2. Pt will be able to don/doff orthosis bilateral hands with min A and caregiver assist to lead to improved fine motor control and performance   Eval status. Will make upon future appointments   24: Able don/doff left, unable to perform with right, progressing   7/10/24: pt is able to don bilateral, goal met   3. Pt will be able to improve bilateral  strength to over 25# to improve functional  strength   Eval status: Right:5#   Left 15  24:  Right: 5#  Left: 10# no progress   24:  Right: 5#  Left: 14, no progress      LT weeks   Pt will be able to complete 9 hole pegs test with splint donned to improve fine motor performance   Eval status: unable to complete at all with no splint   24: Pt able to pick 1

## 2024-08-20 NOTE — PROGRESS NOTES
PHYSICAL / OCCUPATIONAL THERAPY - DAILY TREATMENT NOTE     Patient Name: Laura Head    Date: 2024    : 1935  Insurance: Payor: [facility] MEDICAID / Plan: ANEESH [facility] HEALTHKEEPERS PLUS / Product Type: *No Product type* /      Patient  verified Yes     Visit #   Current / Total 5 8   Time   In / Out 220 300   Pain   In / Out 0 0   Subjective Functional Status/Changes: \"Its all the numbness\"    Changes to:  Allergies, Med Hx, Sx Hx?   no       TREATMENT AREA =  Pain in right hand [M79.641]    If an interpreting service is utilized for treatment of this patient, the contents of this document represent the material reviewed with the patient via the .     OBJECTIVE        Therapeutic Procedures:  Tx Min Billable or 1:1 Min (if diff from Tx Min) Procedure, Rationale, Specifics   15  77060 Therapeutic Exercise (timed):  increase ROM, strength, coordination, balance, and proprioception to improve patient's ability to progress to PLOF and address remaining functional goals. (see flow sheet as applicable)    Details if applicable:       25  35743 Therapeutic Activity (timed):  use of dynamic activities replicating functional movements to increase ROM, strength, coordination, balance, and proprioception in order to improve patient's ability to progress to PLOF and address remaining functional goals.  (see flow sheet as applicable)    Details if applicable:                    40  [facility] Totals Reminder: bill using total billable min of TIMED therapeutic procedures (example: do not include dry needle or estim unattended, both untimed codes, in totals to left)  8-22 min = 1 unit; 23-37 min = 2 units; 38-52 min = 3 units; 53-67 min = 4 units; 68-82 min = 5 units   Total Total     TOTAL TREATMENT TIME:        40     [x]  Patient Education billed concurrently with other procedures   [x] Review HEP      Objective Information/Functional Measures/Assessment    Pt has been through course of 5 OT visits

## 2024-08-26 ENCOUNTER — TELEPHONE (OUTPATIENT)
Facility: HOSPITAL | Age: 89
End: 2024-08-26

## 2024-08-27 ENCOUNTER — HOSPITAL ENCOUNTER (OUTPATIENT)
Facility: HOSPITAL | Age: 89
Setting detail: RECURRING SERIES
Discharge: HOME | End: 2024-08-30
Payer: MEDICAID

## 2024-08-27 PROCEDURE — 97530 THERAPEUTIC ACTIVITIES: CPT

## 2024-08-27 PROCEDURE — 97110 THERAPEUTIC EXERCISES: CPT

## 2024-08-27 RX ORDER — CETIRIZINE HYDROCHLORIDE 10 MG/1
TABLET ORAL
Qty: 90 TABLET | Refills: 3 | Status: SHIPPED | OUTPATIENT
Start: 2024-08-27

## 2024-08-27 NOTE — PROGRESS NOTES
PHYSICAL / OCCUPATIONAL THERAPY - DAILY TREATMENT NOTE     Patient Name: Laura Head    Date: 2024    : 1935  Insurance: Payor: [facility] MEDICAID / Plan: ANEESH [facility] HEALTHKEEPERS PLUS / Product Type: *No Product type* /      Patient  verified Yes     Visit #   Current / Total 6 8   Time   In / Out 140 220   Pain   In / Out 0 0   Subjective Functional Status/Changes: \"They just numb\"    Changes to:  Allergies, Med Hx, Sx Hx?   no       TREATMENT AREA =  Pain in right hand [M79.641]    If an interpreting service is utilized for treatment of this patient, the contents of this document represent the material reviewed with the patient via the .     OBJECTIVE    Therapeutic Procedures:  Tx Min Billable or 1:1 Min (if diff from Tx Min) Procedure, Rationale, Specifics   15  42744 Therapeutic Exercise (timed):  increase ROM, strength, coordination, balance, and proprioception to improve patient's ability to progress to PLOF and address remaining functional goals. (see flow sheet as applicable)    Details if applicable:       25  93248 Therapeutic Activity (timed):  use of dynamic activities replicating functional movements to increase ROM, strength, coordination, balance, and proprioception in order to improve patient's ability to progress to PLOF and address remaining functional goals.  (see flow sheet as applicable)    Details if applicable:                    40  [facility] Totals Reminder: bill using total billable min of TIMED therapeutic procedures (example: do not include dry needle or estim unattended, both untimed codes, in totals to left)  8-22 min = 1 unit; 23-37 min = 2 units; 38-52 min = 3 units; 53-67 min = 4 units; 68-82 min = 5 units   Total Total     TOTAL TREATMENT TIME:        40     [x]  Patient Education billed concurrently with other procedures   [x] Review HEP      Objective Information/Functional Measures/Assessment    Pt reports using her braces at home but unable to  no splint   7/1/24: Pt able to pick 1 peg with the left hand. With on towel, pt able to  all pegs with additional time, cues top  pegs with end closer.  Able to pick off towel with right splint and place in holes with multiple drops and cues, progressing   8/20/24:  Pt is able to complete modified when they are both donned, drops noted but able to complete without assistance, goal progression.      Pt will be able to improve bilateral pinch strength to over 6# to improve fine motor grasp  Eval status Right: 2#   Left: 4#   7/1/24:  Right 4#   Left: 6# progressing   8/20/24: Pinch gauge broken, new one ordered     3. Pt will be able to improve to under 65% on quick DASH to improve self reported ADL/IADL performance   Eval status: 97%     PLAN  Yes  Continue plan of care  []  Upgrade activities as tolerated  []  Discharge due to :  []  Other:    GARCÍA Dexter, OTR/L, CHT     8/27/2024    8:26 AM    Future Appointments   Date Time Provider Department Center   8/27/2024  1:40 PM Krishna Cruz OT [facility]   9/3/2024  1:40 PM Krishna Cruz OT [facility]   9/10/2024  1:40 PM Krishna Cruz OT [facility]   9/12/2024  1:40 PM Krishna Cruz OT [facility]   11/4/2024 11:45 AM Ofelia Fernandez MD [facility] DEP

## 2024-08-27 NOTE — TELEPHONE ENCOUNTER
Last seen 7/3/24  Last labs 6/24/24  Last filled 7/17/24  Next appointment 11/4/24    Lab Results   Component Value Date     06/24/2024    K 4.1 06/24/2024     06/24/2024    CO2 24 06/24/2024    BUN 9 06/24/2024    CREATININE 0.86 06/24/2024    GLUCOSE 98 06/24/2024    CALCIUM 9.4 06/24/2024    BILITOT 0.5 06/24/2024    ALKPHOS 135 (H) 06/24/2024    AST 16 06/24/2024    ALT 10 06/24/2024    LABGLOM 66 06/24/2024    GFRAA 65 10/30/2021    AGRATIO 1.6 12/13/2023    GLOB 4.0 12/27/2019

## 2024-09-03 ENCOUNTER — HOSPITAL ENCOUNTER (OUTPATIENT)
Facility: HOSPITAL | Age: 89
Setting detail: RECURRING SERIES
Discharge: HOME | End: 2024-09-06
Payer: MEDICAID

## 2024-09-03 ENCOUNTER — TELEPHONE (OUTPATIENT)
Facility: CLINIC | Age: 89
End: 2024-09-03

## 2024-09-03 PROCEDURE — 97535 SELF CARE MNGMENT TRAINING: CPT

## 2024-09-03 PROCEDURE — 97110 THERAPEUTIC EXERCISES: CPT

## 2024-09-03 PROCEDURE — 97530 THERAPEUTIC ACTIVITIES: CPT

## 2024-09-03 NOTE — PROGRESS NOTES
PHYSICAL / OCCUPATIONAL THERAPY - DAILY TREATMENT NOTE     Patient Name: Laura Head    Date: 9/3/2024    : 1935  Insurance: Payor: [facility] MEDICAID / Plan: ANEESH [facility] HEALTHKEEPERS PLUS / Product Type: *No Product type* /      Patient  verified Yes     Visit #   Current / Total 7 8   Time   In / Out 140 220   Pain   In / Out 0 0   Subjective Functional Status/Changes: \"I forgot how to put this one one\"   Changes to:  Allergies, Med Hx, Sx Hx?   no       TREATMENT AREA =  Pain in right hand [M79.641]    If an interpreting service is utilized for treatment of this patient, the contents of this document represent the material reviewed with the patient via the .     OBJECTIVE      Therapeutic Procedures:  Tx Min Billable or 1:1 Min (if diff from Tx Min) Procedure, Rationale, Specifics   15  97758 Therapeutic Activity (timed):  use of dynamic activities replicating functional movements to increase ROM, strength, coordination, balance, and proprioception in order to improve patient's ability to progress to PLOF and address remaining functional goals.  (see flow sheet as applicable)    Details if applicable:       15    70711 Therapeutic Exercise (timed):  increase ROM, strength, coordination, balance, and proprioception to improve patient's ability to progress to PLOF and address remaining functional goals. (see flow sheet as applicable)     Details if applicable:        10  78801 Self Care/Home Management (timed):  improve patient knowledge and understanding of positioning, posture/ergonomics, home safety, activity modification, diagnosis/prognosis, and physical therapy expectations, procedures and progression  to improve patient's ability to progress to PLOF and address remaining functional goals.  (see flow sheet as applicable)     Details if applicable:               40  [facility] Totals Reminder: bill using total billable min of TIMED therapeutic procedures (example: do not include dry

## 2024-09-03 NOTE — TELEPHONE ENCOUNTER
[facility] called in regards to a order that was faxed over to us. Informed them that the order was awaiting signature and to be faxed back.

## 2024-09-10 ENCOUNTER — HOSPITAL ENCOUNTER (OUTPATIENT)
Facility: HOSPITAL | Age: 89
Setting detail: RECURRING SERIES
Discharge: HOME | End: 2024-09-13
Payer: MEDICAID

## 2024-09-10 PROCEDURE — 97530 THERAPEUTIC ACTIVITIES: CPT

## 2024-09-10 PROCEDURE — 97110 THERAPEUTIC EXERCISES: CPT

## 2024-09-10 PROCEDURE — 97535 SELF CARE MNGMENT TRAINING: CPT

## 2024-09-12 ENCOUNTER — APPOINTMENT (OUTPATIENT)
Facility: HOSPITAL | Age: 89
End: 2024-09-12
Payer: MEDICAID

## 2024-09-13 ENCOUNTER — TELEPHONE (OUTPATIENT)
Facility: CLINIC | Age: 89
End: 2024-09-13

## 2024-10-30 DIAGNOSIS — J30.9 ALLERGIC RHINITIS, UNSPECIFIED SEASONALITY, UNSPECIFIED TRIGGER: ICD-10-CM

## 2024-10-30 DIAGNOSIS — G62.9 PERIPHERAL POLYNEUROPATHY: ICD-10-CM

## 2024-10-30 RX ORDER — FLUTICASONE PROPIONATE 50 MCG
2 SPRAY, SUSPENSION (ML) NASAL DAILY
Qty: 3 EACH | Refills: 4 | Status: SHIPPED | OUTPATIENT
Start: 2024-10-30

## 2024-10-30 RX ORDER — GABAPENTIN 300 MG/1
CAPSULE ORAL
Qty: 270 CAPSULE | Refills: 1 | Status: SHIPPED | OUTPATIENT
Start: 2024-10-30 | End: 2025-04-28

## 2024-10-30 NOTE — TELEPHONE ENCOUNTER
Last seen 7/3/2024   Last labs   Last filled 2/13/24     Next appointment 11/4/2024   CSA 4/3/24  UDS 4/10/24    Lab Results   Component Value Date     06/24/2024    K 4.1 06/24/2024     06/24/2024    CO2 24 06/24/2024    BUN 9 06/24/2024    CREATININE 0.86 06/24/2024    GLUCOSE 98 06/24/2024    CALCIUM 9.4 06/24/2024    BILITOT 0.5 06/24/2024    ALKPHOS 135 (H) 06/24/2024    AST 16 06/24/2024    ALT 10 06/24/2024    LABGLOM 66 06/24/2024    GFRAA 65 10/30/2021    AGRATIO 1.6 12/13/2023    GLOB 4.0 12/27/2019

## 2024-11-04 ENCOUNTER — OFFICE VISIT (OUTPATIENT)
Facility: CLINIC | Age: 89
End: 2024-11-04
Payer: MEDICAID

## 2024-11-04 VITALS
OXYGEN SATURATION: 95 % | RESPIRATION RATE: 13 BRPM | HEIGHT: 62 IN | DIASTOLIC BLOOD PRESSURE: 71 MMHG | TEMPERATURE: 97.9 F | WEIGHT: 233.8 LBS | HEART RATE: 56 BPM | BODY MASS INDEX: 43.02 KG/M2 | SYSTOLIC BLOOD PRESSURE: 115 MMHG

## 2024-11-04 DIAGNOSIS — G62.9 PERIPHERAL POLYNEUROPATHY: Chronic | ICD-10-CM

## 2024-11-04 DIAGNOSIS — M19.90 OSTEOARTHRITIS, UNSPECIFIED OSTEOARTHRITIS TYPE, UNSPECIFIED SITE: ICD-10-CM

## 2024-11-04 DIAGNOSIS — E78.5 DYSLIPIDEMIA: ICD-10-CM

## 2024-11-04 DIAGNOSIS — E78.5 DYSLIPIDEMIA: Primary | ICD-10-CM

## 2024-11-04 PROCEDURE — 99214 OFFICE O/P EST MOD 30 MIN: CPT | Performed by: FAMILY MEDICINE

## 2024-11-04 PROCEDURE — 1123F ACP DISCUSS/DSCN MKR DOCD: CPT | Performed by: FAMILY MEDICINE

## 2024-11-04 SDOH — ECONOMIC STABILITY: INCOME INSECURITY: HOW HARD IS IT FOR YOU TO PAY FOR THE VERY BASICS LIKE FOOD, HOUSING, MEDICAL CARE, AND HEATING?: NOT HARD AT ALL

## 2024-11-04 SDOH — ECONOMIC STABILITY: FOOD INSECURITY: WITHIN THE PAST 12 MONTHS, THE FOOD YOU BOUGHT JUST DIDN'T LAST AND YOU DIDN'T HAVE MONEY TO GET MORE.: NEVER TRUE

## 2024-11-04 SDOH — ECONOMIC STABILITY: FOOD INSECURITY: WITHIN THE PAST 12 MONTHS, YOU WORRIED THAT YOUR FOOD WOULD RUN OUT BEFORE YOU GOT MONEY TO BUY MORE.: NEVER TRUE

## 2024-11-04 ASSESSMENT — PATIENT HEALTH QUESTIONNAIRE - PHQ9
SUM OF ALL RESPONSES TO PHQ QUESTIONS 1-9: 0
1. LITTLE INTEREST OR PLEASURE IN DOING THINGS: NOT AT ALL
SUM OF ALL RESPONSES TO PHQ QUESTIONS 1-9: 0
SUM OF ALL RESPONSES TO PHQ QUESTIONS 1-9: 0
SUM OF ALL RESPONSES TO PHQ9 QUESTIONS 1 & 2: 0
2. FEELING DOWN, DEPRESSED OR HOPELESS: NOT AT ALL
SUM OF ALL RESPONSES TO PHQ QUESTIONS 1-9: 0

## 2024-11-04 ASSESSMENT — ENCOUNTER SYMPTOMS: RESPIRATORY NEGATIVE: 1

## 2024-11-04 NOTE — PROGRESS NOTES
ASSESSMENT/PLAN:  1. Dyslipidemia  -     Lipid Panel; Future  -     Comprehensive Metabolic Panel; Future  2. Peripheral polyneuropathy  Assessment & Plan:   Chronic, at goal (stable), continue current treatment plan  3. Osteoarthritis, unspecified osteoarthritis type, unspecified site  Assessment & Plan:   Chronic, at goal (stable), continue current treatment plan        Return in about 4 months (around 3/4/2025) for HLD, peripheral neuropathy.      SUBJECTIVE/OBJECTIVE:    Chief Complaint   Patient presents with    Cholesterol Problem         HPI    Laura Head is a 89 y.o. female presenting today for 4 months  follow up of hld, PN, polyarthralgia.  Pt has been feeling well overall but still has discomfort in her hands.  She is using diclofenac and aspercream as needed.      Patient does not need medication refills today.      New concerns today: none      Pt declines flu shot.      Review of Systems   Constitutional: Negative.    HENT: Negative.     Respiratory: Negative.     Cardiovascular: Negative.    Musculoskeletal:  Positive for arthralgias.   All other systems reviewed and are negative.      Physical Exam  Vitals and nursing note reviewed.   Constitutional:       General: She is not in acute distress.     Appearance: Normal appearance.   HENT:      Head: Normocephalic and atraumatic.      Right Ear: External ear normal.      Left Ear: External ear normal.      Nose: Nose normal.      Mouth/Throat:      Mouth: Mucous membranes are moist.   Eyes:      Extraocular Movements: Extraocular movements intact.      Conjunctiva/sclera: Conjunctivae normal.   Cardiovascular:      Rate and Rhythm: Normal rate and regular rhythm.      Heart sounds: No murmur heard.     No friction rub. No gallop.   Pulmonary:      Effort: Pulmonary effort is normal.      Breath sounds: Normal breath sounds. No wheezing, rhonchi or rales.   Musculoskeletal:         General: Normal range of motion.      Cervical back: Normal range of

## 2024-11-04 NOTE — PROGRESS NOTES
Laura Head presents today for   Chief Complaint   Patient presents with    Cholesterol Problem       Is someone accompanying this pt? Yes caregiver    Is the patient using any DME equipment during OV? Yes patient uses walker    Depression Screenin/4/2024    11:54 AM 7/3/2024    11:13 AM 2024     1:07 PM 4/3/2024     1:35 PM 2024     1:33 PM 2023     1:40 PM 3/22/2023     1:31 PM   PHQ-9 Questionaire   Little interest or pleasure in doing things 0 0 0 0 0 0 0   Feeling down, depressed, or hopeless 0 0 0 0 0 0 0   PHQ-9 Total Score 0 0 0 0 0 0 0        ЕКАТЕРИНА 7-Anxiety        No data to display                 Travel Screening:    Travel Screening       Question Response    Have you been in contact with someone who was sick? No / Unsure    Do you have any of the following new or worsening symptoms? None of these    Have you traveled internationally or domestically in the last month? No          Travel History   Travel since 10/04/24    No documented travel since 10/04/24          Health Maintenance reviewed and discussed and ordered per Provider.  Transportation Needs: Unknown (7/3/2024)    PRAPARE - Transportation     Lack of Transportation (Medical): Not on file     Lack of Transportation (Non-Medical): No      Food Insecurity: No Food Insecurity (7/3/2024)    Hunger Vital Sign     Worried About Running Out of Food in the Last Year: Never true     Ran Out of Food in the Last Year: Never true     Financial Resource Strain: Low Risk  (7/3/2024)    Overall Financial Resource Strain (CARDIA)     Difficulty of Paying Living Expenses: Not hard at all     Housing Stability: Unknown (7/3/2024)    Housing Stability Vital Sign     Unable to Pay for Housing in the Last Year: Not on file     Number of Places Lived in the Last Year: Not on file     Unstable Housing in the Last Year: No       Did you provide resources if patient requested them? Yes patient declined      Health Maintenance Due   Topic Date

## 2024-11-15 DIAGNOSIS — E78.00 PURE HYPERCHOLESTEROLEMIA, UNSPECIFIED: ICD-10-CM

## 2024-11-19 RX ORDER — ATORVASTATIN CALCIUM 40 MG/1
40 TABLET, FILM COATED ORAL DAILY
Qty: 90 TABLET | Refills: 4 | Status: SHIPPED | OUTPATIENT
Start: 2024-11-19

## 2024-11-19 RX ORDER — ATORVASTATIN CALCIUM 40 MG/1
40 TABLET, FILM COATED ORAL DAILY
Qty: 90 TABLET | Refills: 0 | Status: SHIPPED | OUTPATIENT
Start: 2024-11-19 | End: 2024-11-19 | Stop reason: CLARIF

## 2024-11-19 NOTE — TELEPHONE ENCOUNTER
Labs:   Lab Results   Component Value Date     06/24/2024    K 4.1 06/24/2024     06/24/2024    CO2 24 06/24/2024    BUN 9 06/24/2024    CREATININE 0.86 06/24/2024    GLUCOSE 98 06/24/2024    CALCIUM 9.4 06/24/2024    BILITOT 0.5 06/24/2024    ALKPHOS 135 (H) 06/24/2024    AST 16 06/24/2024    ALT 10 06/24/2024    LABGLOM 66 06/24/2024    GFRAA 65 10/30/2021    AGRATIO 1.6 12/13/2023    GLOB 4.0 12/27/2019        Additional Notes:

## 2024-12-20 NOTE — PROGRESS NOTES
Patient notified of DR. Ramirez comments on results listed. 0 (no pain/absence of nonverbal indicators of pain)

## 2025-01-21 DIAGNOSIS — M19.042 PRIMARY OSTEOARTHRITIS OF BOTH HANDS: ICD-10-CM

## 2025-01-21 DIAGNOSIS — M19.041 PRIMARY OSTEOARTHRITIS OF BOTH HANDS: ICD-10-CM

## 2025-01-21 DIAGNOSIS — M25.50 POLYARTHRALGIA: ICD-10-CM

## 2025-01-21 NOTE — TELEPHONE ENCOUNTER
Last seen 11/4/2024   Last labs   Last filled  1/2/24  Next appointment 3/4/2025     Lab Results   Component Value Date     06/24/2024    K 4.1 06/24/2024     06/24/2024    CO2 24 06/24/2024    BUN 9 06/24/2024    CREATININE 0.86 06/24/2024    GLUCOSE 98 06/24/2024    CALCIUM 9.4 06/24/2024    BILITOT 0.5 06/24/2024    ALKPHOS 135 (H) 06/24/2024    AST 16 06/24/2024    ALT 10 06/24/2024    LABGLOM 66 06/24/2024    GFRAA 65 10/30/2021    AGRATIO 1.6 12/13/2023    GLOB 4.0 12/27/2019

## 2025-02-26 ENCOUNTER — HOSPITAL ENCOUNTER (OUTPATIENT)
Facility: HOSPITAL | Age: 89
Setting detail: SPECIMEN
Discharge: HOME | End: 2025-03-01

## 2025-02-26 LAB — LABCORP SPECIMEN COLLECTION: NORMAL

## 2025-02-26 PROCEDURE — 99001 SPECIMEN HANDLING PT-LAB: CPT

## 2025-02-27 LAB
ALBUMIN SERPL-MCNC: 3.8 G/DL (ref 3.7–4.7)
ALP SERPL-CCNC: 145 IU/L (ref 44–121)
ALT SERPL-CCNC: 9 IU/L (ref 0–32)
AST SERPL-CCNC: 12 IU/L (ref 0–40)
BILIRUB SERPL-MCNC: 0.7 MG/DL (ref 0–1.2)
BUN SERPL-MCNC: 10 MG/DL (ref 8–27)
BUN/CREAT SERPL: 12 (ref 12–28)
CALCIUM SERPL-MCNC: 9.5 MG/DL (ref 8.7–10.3)
CHLORIDE SERPL-SCNC: 106 MMOL/L (ref 96–106)
CHOLEST SERPL-MCNC: 183 MG/DL (ref 100–199)
CO2 SERPL-SCNC: 25 MMOL/L (ref 20–29)
CREAT SERPL-MCNC: 0.85 MG/DL (ref 0.57–1)
EGFRCR SERPLBLD CKD-EPI 2021: 66 ML/MIN/1.73
GLOBULIN SER CALC-MCNC: 2.6 G/DL (ref 1.5–4.5)
GLUCOSE SERPL-MCNC: 83 MG/DL (ref 70–99)
HDLC SERPL-MCNC: 58 MG/DL
LDLC SERPL CALC-MCNC: 111 MG/DL (ref 0–99)
POTASSIUM SERPL-SCNC: 4.3 MMOL/L (ref 3.5–5.2)
PROT SERPL-MCNC: 6.4 G/DL (ref 6–8.5)
SODIUM SERPL-SCNC: 143 MMOL/L (ref 134–144)
TRIGL SERPL-MCNC: 77 MG/DL (ref 0–149)
VLDLC SERPL CALC-MCNC: 14 MG/DL (ref 5–40)

## 2025-03-04 ENCOUNTER — HOSPITAL ENCOUNTER (OUTPATIENT)
Facility: HOSPITAL | Age: 89
Discharge: HOME | End: 2025-03-06
Attending: FAMILY MEDICINE
Payer: MEDICAID

## 2025-03-04 ENCOUNTER — OFFICE VISIT (OUTPATIENT)
Facility: CLINIC | Age: 89
End: 2025-03-04
Payer: MEDICAID

## 2025-03-04 VITALS
WEIGHT: 233 LBS | HEIGHT: 62 IN | OXYGEN SATURATION: 97 % | TEMPERATURE: 98.1 F | BODY MASS INDEX: 42.88 KG/M2 | SYSTOLIC BLOOD PRESSURE: 109 MMHG | HEART RATE: 74 BPM | DIASTOLIC BLOOD PRESSURE: 66 MMHG | RESPIRATION RATE: 13 BRPM

## 2025-03-04 DIAGNOSIS — Z51.81 MEDICATION MONITORING ENCOUNTER: ICD-10-CM

## 2025-03-04 DIAGNOSIS — E78.00 PURE HYPERCHOLESTEROLEMIA, UNSPECIFIED: Primary | Chronic | ICD-10-CM

## 2025-03-04 DIAGNOSIS — M79.89 LEFT LEG SWELLING: ICD-10-CM

## 2025-03-04 DIAGNOSIS — G62.9 PERIPHERAL POLYNEUROPATHY: Chronic | ICD-10-CM

## 2025-03-04 PROCEDURE — 99214 OFFICE O/P EST MOD 30 MIN: CPT | Performed by: FAMILY MEDICINE

## 2025-03-04 PROCEDURE — 93971 EXTREMITY STUDY: CPT

## 2025-03-04 PROCEDURE — 1123F ACP DISCUSS/DSCN MKR DOCD: CPT | Performed by: FAMILY MEDICINE

## 2025-03-04 SDOH — ECONOMIC STABILITY: FOOD INSECURITY: WITHIN THE PAST 12 MONTHS, THE FOOD YOU BOUGHT JUST DIDN'T LAST AND YOU DIDN'T HAVE MONEY TO GET MORE.: NEVER TRUE

## 2025-03-04 SDOH — ECONOMIC STABILITY: FOOD INSECURITY: WITHIN THE PAST 12 MONTHS, YOU WORRIED THAT YOUR FOOD WOULD RUN OUT BEFORE YOU GOT MONEY TO BUY MORE.: NEVER TRUE

## 2025-03-04 ASSESSMENT — PATIENT HEALTH QUESTIONNAIRE - PHQ9
SUM OF ALL RESPONSES TO PHQ QUESTIONS 1-9: 0
2. FEELING DOWN, DEPRESSED OR HOPELESS: NOT AT ALL
SUM OF ALL RESPONSES TO PHQ QUESTIONS 1-9: 0
1. LITTLE INTEREST OR PLEASURE IN DOING THINGS: NOT AT ALL
SUM OF ALL RESPONSES TO PHQ QUESTIONS 1-9: 0
SUM OF ALL RESPONSES TO PHQ QUESTIONS 1-9: 0

## 2025-03-04 NOTE — PROGRESS NOTES
Laura Head presents today for   Chief Complaint   Patient presents with    Cyst     Inner left thigh with no pain for about 3 weeks.     Swelling     Right leg swelling.       Is someone accompanying this pt? Yes daughter in law Liss    Is the patient using any DME equipment during OV? Wheelchair     Depression Screening:      3/4/2025    12:12 PM 11/4/2024    11:54 AM 7/3/2024    11:13 AM 5/20/2024     1:07 PM 4/3/2024     1:35 PM 1/2/2024     1:33 PM 9/25/2023     1:40 PM   PHQ-9 Questionaire   Little interest or pleasure in doing things 0 0 0 0 0 0 0   Feeling down, depressed, or hopeless 0 0 0 0 0 0 0   PHQ-9 Total Score 0 0 0 0 0 0 0        ЕКАТЕРИНА 7-Anxiety        No data to display                   Learning Assessment:  No question data found.     Fall Risk       No data to display                   Travel Screening:    Travel Screening       Question Response    Have you been in contact with someone who was sick? No / Unsure    Do you have any of the following new or worsening symptoms? None of these    Have you traveled internationally or domestically in the last month? No          Travel History   Travel since 02/04/25    No documented travel since 02/04/25            Health Maintenance reviewed and discussed and ordered per Provider.  Transportation Needs: No Transportation Needs (3/4/2025)    PRAPARE - Transportation     Lack of Transportation (Medical): No     Lack of Transportation (Non-Medical): No      Food Insecurity: No Food Insecurity (3/4/2025)    Hunger Vital Sign     Worried About Running Out of Food in the Last Year: Never true     Ran Out of Food in the Last Year: Never true     Financial Resource Strain: Low Risk  (11/4/2024)    Overall Financial Resource Strain (CARDIA)     Difficulty of Paying Living Expenses: Not hard at all     Housing Stability: Low Risk  (3/4/2025)    Housing Stability Vital Sign     Unable to Pay for Housing in the Last Year: No     Number of Times Moved in the Last

## 2025-03-04 NOTE — PROGRESS NOTES
ASSESSMENT/PLAN:  1. Pure hypercholesterolemia, unspecified  Assessment & Plan:   Chronic, at goal (stable), continue current treatment plan  2. Peripheral polyneuropathy  Assessment & Plan:   Chronic, at goal (stable), continue current treatment plan  3. Left leg swelling  -     Vascular duplex lower extremity venous left; Future  4. Medication monitoring encounter  -     Compliance Drug Analysis, Urine; Future        Return in about 4 months (around 7/4/2025) for HLD, peripheral neuropathy, (no labs).      SUBJECTIVE/OBJECTIVE:    Chief Complaint   Patient presents with    Cyst     Inner left thigh with no pain for about 3 weeks.     Swelling     Right leg swelling.         HPI    Laura Head is a 89 y.o. female presenting today for   4 months  follow up of hld, PN.    Patient had labs on 2/26/25.  Labs reviewed in detail with patient       Patient does not need medication refills today.      New concerns today: pt has noticed swelling of both lower legs, L>R.        Review of Systems   Constitutional: Negative.    HENT: Negative.     Respiratory: Negative.     Cardiovascular:  Positive for leg swelling.   All other systems reviewed and are negative.      Physical Exam  Vitals and nursing note reviewed.   Constitutional:       General: She is not in acute distress.     Appearance: Normal appearance.   HENT:      Head: Normocephalic and atraumatic.      Right Ear: External ear normal.      Left Ear: External ear normal.      Nose: Nose normal.      Mouth/Throat:      Mouth: Mucous membranes are moist.   Eyes:      Extraocular Movements: Extraocular movements intact.      Conjunctiva/sclera: Conjunctivae normal.   Cardiovascular:      Rate and Rhythm: Normal rate and regular rhythm.      Heart sounds: No murmur heard.     No friction rub. No gallop.   Pulmonary:      Effort: Pulmonary effort is normal.      Breath sounds: Normal breath sounds. No wheezing, rhonchi or rales.   Musculoskeletal:         General: Normal

## 2025-03-05 LAB — ECHO BSA: 2.15 M2

## 2025-03-13 LAB — DRUGS UR: NORMAL

## 2025-05-05 DIAGNOSIS — G62.9 PERIPHERAL POLYNEUROPATHY: ICD-10-CM

## 2025-05-06 RX ORDER — GABAPENTIN 300 MG/1
CAPSULE ORAL
Qty: 90 CAPSULE | Refills: 5 | Status: SHIPPED | OUTPATIENT
Start: 2025-05-06 | End: 2025-11-02

## 2025-05-06 NOTE — TELEPHONE ENCOUNTER
Last seen 3/4/2025   Last labs   Last filled  10/30/2024  Next appointment 7/8/2025   CSA 3/4/25  UDS 3/10/25    Lab Results   Component Value Date     02/26/2025    K 4.3 02/26/2025     02/26/2025    CO2 25 02/26/2025    BUN 10 02/26/2025    CREATININE 0.85 02/26/2025    GLUCOSE 83 02/26/2025    CALCIUM 9.5 02/26/2025    BILITOT 0.7 02/26/2025    ALKPHOS 145 (H) 02/26/2025    AST 12 02/26/2025    ALT 9 02/26/2025    LABGLOM 66 02/26/2025    GFRAA 65 10/30/2021    AGRATIO 1.6 12/13/2023    GLOB 4.0 12/27/2019

## 2025-05-07 ENCOUNTER — TELEPHONE (OUTPATIENT)
Facility: CLINIC | Age: 89
End: 2025-05-07

## 2025-05-07 NOTE — TELEPHONE ENCOUNTER
Caller is needing diagnosis code for 3/10/25 compliance drug analysis, urine     Please call back with info or respond by email [email or web address]

## 2025-06-10 DIAGNOSIS — J30.9 ALLERGIC RHINITIS, UNSPECIFIED SEASONALITY, UNSPECIFIED TRIGGER: ICD-10-CM

## 2025-06-10 NOTE — TELEPHONE ENCOUNTER
Last seen 3/4/2025   Last labs   Last filled  8/27/24  Next appointment 7/8/2025     Lab Results   Component Value Date     02/26/2025    K 4.3 02/26/2025     02/26/2025    CO2 25 02/26/2025    BUN 10 02/26/2025    CREATININE 0.85 02/26/2025    GLUCOSE 83 02/26/2025    CALCIUM 9.5 02/26/2025    BILITOT 0.7 02/26/2025    ALKPHOS 145 (H) 02/26/2025    AST 12 02/26/2025    ALT 9 02/26/2025    LABGLOM 66 02/26/2025    GFRAA 65 10/30/2021    AGRATIO 1.6 12/13/2023    GLOB 4.0 12/27/2019

## 2025-06-12 RX ORDER — CETIRIZINE HYDROCHLORIDE 10 MG/1
TABLET, FILM COATED ORAL
Qty: 90 TABLET | Refills: 1 | Status: SHIPPED | OUTPATIENT
Start: 2025-06-12

## 2025-06-19 DIAGNOSIS — M25.50 POLYARTHRALGIA: ICD-10-CM

## 2025-06-19 DIAGNOSIS — M19.041 PRIMARY OSTEOARTHRITIS OF BOTH HANDS: ICD-10-CM

## 2025-06-19 DIAGNOSIS — M19.042 PRIMARY OSTEOARTHRITIS OF BOTH HANDS: ICD-10-CM

## 2025-06-20 NOTE — TELEPHONE ENCOUNTER
Last seen 3/4/2025   Last labs   Last filled  1/23/25  Next appointment 7/8/2025     Lab Results   Component Value Date     02/26/2025    K 4.3 02/26/2025     02/26/2025    CO2 25 02/26/2025    BUN 10 02/26/2025    CREATININE 0.85 02/26/2025    GLUCOSE 83 02/26/2025    CALCIUM 9.5 02/26/2025    BILITOT 0.7 02/26/2025    ALKPHOS 145 (H) 02/26/2025    AST 12 02/26/2025    ALT 9 02/26/2025    LABGLOM 66 02/26/2025    GFRAA 65 10/30/2021    AGRATIO 1.6 12/13/2023    GLOB 4.0 12/27/2019

## 2025-07-07 DIAGNOSIS — M25.50 POLYARTHRALGIA: Chronic | ICD-10-CM

## 2025-07-07 RX ORDER — CELECOXIB 200 MG/1
200 CAPSULE ORAL DAILY
Qty: 90 CAPSULE | Refills: 4 | Status: SHIPPED | OUTPATIENT
Start: 2025-07-07

## 2025-07-07 NOTE — TELEPHONE ENCOUNTER
Last seen 3/4/2025   Last labs   Last filled  7/3/24  Next appointment 7/8/2025     Lab Results   Component Value Date     02/26/2025    K 4.3 02/26/2025     02/26/2025    CO2 25 02/26/2025    BUN 10 02/26/2025    CREATININE 0.85 02/26/2025    GLUCOSE 83 02/26/2025    CALCIUM 9.5 02/26/2025    BILITOT 0.7 02/26/2025    ALKPHOS 145 (H) 02/26/2025    AST 12 02/26/2025    ALT 9 02/26/2025    LABGLOM 66 02/26/2025    GFRAA 65 10/30/2021    AGRATIO 1.6 12/13/2023    GLOB 4.0 12/27/2019

## 2025-07-08 ENCOUNTER — OFFICE VISIT (OUTPATIENT)
Facility: CLINIC | Age: 89
End: 2025-07-08
Payer: MEDICAID

## 2025-07-08 VITALS
RESPIRATION RATE: 14 BRPM | WEIGHT: 227 LBS | BODY MASS INDEX: 41.77 KG/M2 | DIASTOLIC BLOOD PRESSURE: 67 MMHG | SYSTOLIC BLOOD PRESSURE: 121 MMHG | OXYGEN SATURATION: 98 % | TEMPERATURE: 97.7 F | HEIGHT: 62 IN | HEART RATE: 73 BPM

## 2025-07-08 DIAGNOSIS — E78.00 PURE HYPERCHOLESTEROLEMIA, UNSPECIFIED: Primary | Chronic | ICD-10-CM

## 2025-07-08 DIAGNOSIS — E78.00 PURE HYPERCHOLESTEROLEMIA, UNSPECIFIED: Chronic | ICD-10-CM

## 2025-07-08 DIAGNOSIS — G62.9 PERIPHERAL POLYNEUROPATHY: ICD-10-CM

## 2025-07-08 PROCEDURE — 1123F ACP DISCUSS/DSCN MKR DOCD: CPT | Performed by: FAMILY MEDICINE

## 2025-07-08 PROCEDURE — 99213 OFFICE O/P EST LOW 20 MIN: CPT | Performed by: FAMILY MEDICINE

## 2025-07-08 SDOH — ECONOMIC STABILITY: FOOD INSECURITY: WITHIN THE PAST 12 MONTHS, THE FOOD YOU BOUGHT JUST DIDN'T LAST AND YOU DIDN'T HAVE MONEY TO GET MORE.: NEVER TRUE

## 2025-07-08 SDOH — ECONOMIC STABILITY: FOOD INSECURITY: WITHIN THE PAST 12 MONTHS, YOU WORRIED THAT YOUR FOOD WOULD RUN OUT BEFORE YOU GOT MONEY TO BUY MORE.: NEVER TRUE

## 2025-07-08 ASSESSMENT — ENCOUNTER SYMPTOMS: RESPIRATORY NEGATIVE: 1

## 2025-07-08 NOTE — PROGRESS NOTES
Laura Head presents today for   Chief Complaint   Patient presents with    Cholesterol Problem    Peripheral Neuropathy       Is someone accompanying this pt? Yes caregiver    Is the patient using any DME equipment during OV? Yes patient use wheelchair    Depression Screenin/8/2025     1:36 PM 3/4/2025    12:12 PM 2024    11:54 AM 7/3/2024    11:13 AM 2024     1:07 PM 4/3/2024     1:35 PM 2024     1:33 PM   PHQ-9 Questionaire   Little interest or pleasure in doing things 0 0 0 0 0 0 0   Feeling down, depressed, or hopeless 0 0 0 0 0 0 0   PHQ-9 Total Score 0 0 0 0 0 0 0        ЕКАТЕРИНА 7-Anxiety        No data to display                   Learning Assessment:  No question data found.     Fall Risk       No data to display                   Travel Screening:    Travel Screening       Question Response    Have you been in contact with someone who was sick? No / Unsure    Do you have any of the following new or worsening symptoms? None of these    Have you traveled internationally or domestically in the last month? No          Travel History   Travel since 25    No documented travel since 25            Health Maintenance reviewed and discussed and ordered per Provider.  Transportation Needs: No Transportation Needs (2025)    PRAPARE - Transportation     Lack of Transportation (Medical): No     Lack of Transportation (Non-Medical): No      Food Insecurity: No Food Insecurity (2025)    Hunger Vital Sign     Worried About Running Out of Food in the Last Year: Never true     Ran Out of Food in the Last Year: Never true     Financial Resource Strain: Low Risk  (2024)    Overall Financial Resource Strain (CARDIA)     Difficulty of Paying Living Expenses: Not hard at all     Housing Stability: Low Risk  (2025)    Housing Stability Vital Sign     Unable to Pay for Housing in the Last Year: No     Number of Times Moved in the Last Year: 0     Homeless in the Last Year: No

## 2025-07-08 NOTE — PROGRESS NOTES
ASSESSMENT/PLAN:  1. Pure hypercholesterolemia, unspecified  -     Lipid Panel; Future  -     Comprehensive Metabolic Panel; Future  2. Peripheral polyneuropathy  Assessment & Plan:   Chronic, at goal (stable), continue current treatment plan.  UDS at next appt.         Return in about 3 months (around 10/8/2025) for HTN, peripheral neuropathy, lab review.      SUBJECTIVE/OBJECTIVE:    Chief Complaint   Patient presents with    Cholesterol Problem    Peripheral Neuropathy         Neuropathy          Laura Head is a 89 y.o. female presenting today for    4 months  follow up of hld, PN.  Pt has been doing well overall.     Patient does not need medication refills today.      New concerns today: none        Review of Systems   Constitutional: Negative.    HENT: Negative.     Respiratory: Negative.     Cardiovascular: Negative.    All other systems reviewed and are negative.      Physical Exam  Vitals and nursing note reviewed.   Constitutional:       General: She is not in acute distress.     Appearance: Normal appearance.   HENT:      Head: Normocephalic and atraumatic.      Right Ear: External ear normal.      Left Ear: External ear normal.      Nose: Nose normal.      Mouth/Throat:      Mouth: Mucous membranes are moist.   Eyes:      Extraocular Movements: Extraocular movements intact.      Conjunctiva/sclera: Conjunctivae normal.   Cardiovascular:      Rate and Rhythm: Normal rate and regular rhythm.      Heart sounds: No murmur heard.     No friction rub. No gallop.   Pulmonary:      Effort: Pulmonary effort is normal.      Breath sounds: Normal breath sounds. No wheezing, rhonchi or rales.   Musculoskeletal:         General: Normal range of motion.      Cervical back: Normal range of motion.   Skin:     General: Skin is warm and dry.   Neurological:      Mental Status: She is alert and oriented to person, place, and time.      Coordination: Coordination normal.   Psychiatric:         Mood and Affect: Mood